# Patient Record
Sex: MALE | Race: WHITE | NOT HISPANIC OR LATINO | Employment: OTHER | URBAN - NONMETROPOLITAN AREA
[De-identification: names, ages, dates, MRNs, and addresses within clinical notes are randomized per-mention and may not be internally consistent; named-entity substitution may affect disease eponyms.]

---

## 2017-01-01 ENCOUNTER — ANESTHESIA (OUTPATIENT)
Dept: PERIOP | Facility: HOSPITAL | Age: 82
End: 2017-01-01

## 2017-01-01 ENCOUNTER — ANTICOAGULATION VISIT (OUTPATIENT)
Dept: CARDIAC SURGERY | Facility: CLINIC | Age: 82
End: 2017-01-01

## 2017-01-01 ENCOUNTER — APPOINTMENT (OUTPATIENT)
Dept: LAB | Facility: HOSPITAL | Age: 82
End: 2017-01-01

## 2017-01-01 ENCOUNTER — OFFICE VISIT (OUTPATIENT)
Dept: CARDIAC SURGERY | Facility: CLINIC | Age: 82
End: 2017-01-01

## 2017-01-01 ENCOUNTER — APPOINTMENT (OUTPATIENT)
Dept: GENERAL RADIOLOGY | Facility: HOSPITAL | Age: 82
End: 2017-01-01

## 2017-01-01 ENCOUNTER — HOSPITAL ENCOUNTER (OUTPATIENT)
Facility: HOSPITAL | Age: 82
Setting detail: HOSPITAL OUTPATIENT SURGERY
Discharge: HOME | End: 2017-05-02
Attending: THORACIC SURGERY (CARDIOTHORACIC VASCULAR SURGERY) | Admitting: THORACIC SURGERY (CARDIOTHORACIC VASCULAR SURGERY)

## 2017-01-01 ENCOUNTER — APPOINTMENT (OUTPATIENT)
Dept: ULTRASOUND IMAGING | Facility: HOSPITAL | Age: 82
End: 2017-01-01

## 2017-01-01 ENCOUNTER — HOSPITAL ENCOUNTER (INPATIENT)
Facility: HOSPITAL | Age: 82
LOS: 11 days | Discharge: HOME | End: 2017-03-16
Attending: EMERGENCY MEDICINE | Admitting: HOSPITALIST

## 2017-01-01 ENCOUNTER — TRANSCRIBE ORDERS (OUTPATIENT)
Dept: LAB | Facility: HOSPITAL | Age: 82
End: 2017-01-01

## 2017-01-01 ENCOUNTER — HOSPITAL ENCOUNTER (INPATIENT)
Facility: HOSPITAL | Age: 82
LOS: 3 days | Discharge: HOSPICE | End: 2017-06-05
Attending: FAMILY MEDICINE | Admitting: FAMILY MEDICINE

## 2017-01-01 ENCOUNTER — HOSPITAL ENCOUNTER (EMERGENCY)
Facility: HOSPITAL | Age: 82
Discharge: HOME | End: 2017-05-12
Attending: EMERGENCY MEDICINE | Admitting: EMERGENCY MEDICINE

## 2017-01-01 ENCOUNTER — HOSPITAL ENCOUNTER (OUTPATIENT)
Dept: CT IMAGING | Facility: HOSPITAL | Age: 82
Discharge: HOME | End: 2017-05-16
Admitting: INTERNAL MEDICINE

## 2017-01-01 ENCOUNTER — ANTICOAGULATION VISIT (OUTPATIENT)
Dept: CARDIOLOGY | Facility: CLINIC | Age: 82
End: 2017-01-01

## 2017-01-01 ENCOUNTER — ANESTHESIA EVENT (OUTPATIENT)
Dept: PERIOP | Facility: HOSPITAL | Age: 82
End: 2017-01-01

## 2017-01-01 ENCOUNTER — APPOINTMENT (OUTPATIENT)
Dept: MRI IMAGING | Facility: HOSPITAL | Age: 82
End: 2017-01-01

## 2017-01-01 VITALS — SYSTOLIC BLOOD PRESSURE: 124 MMHG | HEART RATE: 88 BPM | DIASTOLIC BLOOD PRESSURE: 70 MMHG

## 2017-01-01 VITALS
DIASTOLIC BLOOD PRESSURE: 86 MMHG | RESPIRATION RATE: 20 BRPM | OXYGEN SATURATION: 98 % | SYSTOLIC BLOOD PRESSURE: 158 MMHG | HEART RATE: 86 BPM | HEIGHT: 74 IN | TEMPERATURE: 97.5 F | WEIGHT: 157 LBS | BODY MASS INDEX: 20.15 KG/M2

## 2017-01-01 VITALS
HEIGHT: 74 IN | SYSTOLIC BLOOD PRESSURE: 122 MMHG | TEMPERATURE: 97.3 F | WEIGHT: 165 LBS | DIASTOLIC BLOOD PRESSURE: 64 MMHG | BODY MASS INDEX: 21.17 KG/M2 | HEART RATE: 83 BPM | OXYGEN SATURATION: 92 %

## 2017-01-01 VITALS
OXYGEN SATURATION: 96 % | WEIGHT: 156.97 LBS | DIASTOLIC BLOOD PRESSURE: 86 MMHG | HEART RATE: 76 BPM | HEIGHT: 74 IN | TEMPERATURE: 96.9 F | SYSTOLIC BLOOD PRESSURE: 148 MMHG | BODY MASS INDEX: 20.14 KG/M2 | RESPIRATION RATE: 20 BRPM

## 2017-01-01 VITALS
HEIGHT: 74 IN | WEIGHT: 157.85 LBS | BODY MASS INDEX: 20.26 KG/M2 | SYSTOLIC BLOOD PRESSURE: 150 MMHG | DIASTOLIC BLOOD PRESSURE: 80 MMHG | RESPIRATION RATE: 18 BRPM | TEMPERATURE: 97.7 F | OXYGEN SATURATION: 97 % | HEART RATE: 120 BPM

## 2017-01-01 VITALS
OXYGEN SATURATION: 95 % | SYSTOLIC BLOOD PRESSURE: 80 MMHG | HEART RATE: 124 BPM | WEIGHT: 147 LBS | RESPIRATION RATE: 18 BRPM | TEMPERATURE: 97.7 F | DIASTOLIC BLOOD PRESSURE: 53 MMHG | BODY MASS INDEX: 18.87 KG/M2 | HEIGHT: 74 IN

## 2017-01-01 VITALS — SYSTOLIC BLOOD PRESSURE: 99 MMHG | DIASTOLIC BLOOD PRESSURE: 69 MMHG | HEART RATE: 81 BPM

## 2017-01-01 VITALS — HEART RATE: 88 BPM | SYSTOLIC BLOOD PRESSURE: 118 MMHG | DIASTOLIC BLOOD PRESSURE: 60 MMHG

## 2017-01-01 VITALS
HEART RATE: 112 BPM | BODY MASS INDEX: 21.05 KG/M2 | DIASTOLIC BLOOD PRESSURE: 78 MMHG | WEIGHT: 164 LBS | HEIGHT: 74 IN | TEMPERATURE: 96.9 F | SYSTOLIC BLOOD PRESSURE: 146 MMHG | OXYGEN SATURATION: 96 %

## 2017-01-01 VITALS — HEART RATE: 87 BPM | SYSTOLIC BLOOD PRESSURE: 115 MMHG | DIASTOLIC BLOOD PRESSURE: 75 MMHG

## 2017-01-01 VITALS — HEART RATE: 100 BPM | DIASTOLIC BLOOD PRESSURE: 73 MMHG | SYSTOLIC BLOOD PRESSURE: 140 MMHG

## 2017-01-01 VITALS — DIASTOLIC BLOOD PRESSURE: 87 MMHG | SYSTOLIC BLOOD PRESSURE: 114 MMHG | HEART RATE: 98 BPM

## 2017-01-01 VITALS — DIASTOLIC BLOOD PRESSURE: 52 MMHG | SYSTOLIC BLOOD PRESSURE: 100 MMHG

## 2017-01-01 DIAGNOSIS — R11.2 NAUSEA AND VOMITING: ICD-10-CM

## 2017-01-01 DIAGNOSIS — I26.99 OTHER PULMONARY EMBOLISM WITHOUT ACUTE COR PULMONALE, UNSPECIFIED CHRONICITY (HCC): ICD-10-CM

## 2017-01-01 DIAGNOSIS — Z79.01 LONG-TERM (CURRENT) USE OF ANTICOAGULANTS: ICD-10-CM

## 2017-01-01 DIAGNOSIS — Z48.812 SURGICAL AFTERCARE, CIRCULATORY SYSTEM: ICD-10-CM

## 2017-01-01 DIAGNOSIS — I10 BENIGN ESSENTIAL HTN: ICD-10-CM

## 2017-01-01 DIAGNOSIS — R79.1 ELEVATED INR: ICD-10-CM

## 2017-01-01 DIAGNOSIS — N18.5 CKD (CHRONIC KIDNEY DISEASE) STAGE 5, GFR LESS THAN 15 ML/MIN (HCC): ICD-10-CM

## 2017-01-01 DIAGNOSIS — N18.5 CKD (CHRONIC KIDNEY DISEASE) STAGE 5, GFR LESS THAN 15 ML/MIN (HCC): Primary | ICD-10-CM

## 2017-01-01 DIAGNOSIS — N18.4 CHRONIC KIDNEY DISEASE, STAGE IV (SEVERE) (HCC): Primary | ICD-10-CM

## 2017-01-01 DIAGNOSIS — I21.4 NSTEMI (NON-ST ELEVATED MYOCARDIAL INFARCTION) (HCC): ICD-10-CM

## 2017-01-01 DIAGNOSIS — N18.6 ESRD (END STAGE RENAL DISEASE) (HCC): Primary | ICD-10-CM

## 2017-01-01 DIAGNOSIS — T45.511A COUMADIN TOXICITY, ACCIDENTAL OR UNINTENTIONAL, INITIAL ENCOUNTER: Primary | ICD-10-CM

## 2017-01-01 DIAGNOSIS — R01.2 OTHER ABNORMAL HEART SOUNDS: ICD-10-CM

## 2017-01-01 DIAGNOSIS — K22.9 ESOPHAGEAL DISORDER: Primary | ICD-10-CM

## 2017-01-01 DIAGNOSIS — E80.6 HYPERBILIRUBINEMIA: ICD-10-CM

## 2017-01-01 DIAGNOSIS — Z71.89 ENCOUNTER FOR ANTICOAGULATION DISCUSSION AND COUNSELING: Primary | ICD-10-CM

## 2017-01-01 DIAGNOSIS — I50.9 ACUTE CONGESTIVE HEART FAILURE, UNSPECIFIED CONGESTIVE HEART FAILURE TYPE: ICD-10-CM

## 2017-01-01 DIAGNOSIS — N17.9 RENAL FAILURE (ARF), ACUTE ON CHRONIC (HCC): ICD-10-CM

## 2017-01-01 DIAGNOSIS — J18.9 PNEUMONIA OF BOTH LUNGS DUE TO INFECTIOUS ORGANISM, UNSPECIFIED PART OF LUNG: Primary | ICD-10-CM

## 2017-01-01 DIAGNOSIS — N18.9 RENAL FAILURE (ARF), ACUTE ON CHRONIC (HCC): ICD-10-CM

## 2017-01-01 DIAGNOSIS — I77.0 AV FISTULA (HCC): Primary | ICD-10-CM

## 2017-01-01 DIAGNOSIS — I48.0 PAF (PAROXYSMAL ATRIAL FIBRILLATION) (HCC): ICD-10-CM

## 2017-01-01 DIAGNOSIS — J18.9 PNEUMONIA OF BOTH LUNGS DUE TO INFECTIOUS ORGANISM, UNSPECIFIED PART OF LUNG: ICD-10-CM

## 2017-01-01 LAB
ALBUMIN SERPL-MCNC: 3.4 G/DL (ref 3.4–4.8)
ALBUMIN SERPL-MCNC: 3.5 G/DL (ref 3.4–4.8)
ALBUMIN SERPL-MCNC: 3.6 G/DL (ref 3.4–4.8)
ALBUMIN SERPL-MCNC: 3.8 G/DL (ref 3.4–4.8)
ALBUMIN SERPL-MCNC: 3.9 G/DL (ref 3.4–4.8)
ALBUMIN SERPL-MCNC: 4 G/DL (ref 3.4–4.8)
ALBUMIN SERPL-MCNC: 4.2 G/DL (ref 3.4–4.8)
ALBUMIN SERPL-MCNC: 4.2 G/DL (ref 3.4–4.8)
ALBUMIN SERPL-MCNC: 4.3 G/DL (ref 3.4–4.8)
ALBUMIN SERPL-MCNC: 4.4 G/DL (ref 3.4–4.8)
ALBUMIN SERPL-MCNC: 4.4 G/DL (ref 3.4–4.8)
ALBUMIN SERPL-MCNC: 4.6 G/DL (ref 3.4–4.8)
ALBUMIN/GLOB SERPL: 1.2 G/DL (ref 1.1–1.8)
ALBUMIN/GLOB SERPL: 1.3 G/DL (ref 1.1–1.8)
ALBUMIN/GLOB SERPL: 1.4 G/DL (ref 1.1–1.8)
ALBUMIN/GLOB SERPL: 1.5 G/DL (ref 1.1–1.8)
ALBUMIN/GLOB SERPL: 1.5 G/DL (ref 1.1–1.8)
ALBUMIN/GLOB SERPL: 1.6 G/DL (ref 1.1–1.8)
ALP SERPL-CCNC: 101 U/L (ref 38–126)
ALP SERPL-CCNC: 104 U/L (ref 38–126)
ALP SERPL-CCNC: 62 U/L (ref 38–126)
ALP SERPL-CCNC: 64 U/L (ref 38–126)
ALP SERPL-CCNC: 65 U/L (ref 38–126)
ALP SERPL-CCNC: 66 U/L (ref 38–126)
ALP SERPL-CCNC: 66 U/L (ref 38–126)
ALP SERPL-CCNC: 69 U/L (ref 38–126)
ALP SERPL-CCNC: 72 U/L (ref 38–126)
ALP SERPL-CCNC: 74 U/L (ref 38–126)
ALP SERPL-CCNC: 75 U/L (ref 38–126)
ALP SERPL-CCNC: 78 U/L (ref 38–126)
ALP SERPL-CCNC: 80 U/L (ref 38–126)
ALP SERPL-CCNC: 81 U/L (ref 38–126)
ALP SERPL-CCNC: 90 U/L (ref 38–126)
ALP SERPL-CCNC: 93 U/L (ref 38–126)
ALT SERPL W P-5'-P-CCNC: 113 U/L (ref 21–72)
ALT SERPL W P-5'-P-CCNC: 118 U/L (ref 21–72)
ALT SERPL W P-5'-P-CCNC: 125 U/L (ref 21–72)
ALT SERPL W P-5'-P-CCNC: 21 U/L (ref 21–72)
ALT SERPL W P-5'-P-CCNC: 27 U/L (ref 21–72)
ALT SERPL W P-5'-P-CCNC: 27 U/L (ref 21–72)
ALT SERPL W P-5'-P-CCNC: 29 U/L (ref 21–72)
ALT SERPL W P-5'-P-CCNC: 30 U/L (ref 21–72)
ALT SERPL W P-5'-P-CCNC: 30 U/L (ref 21–72)
ALT SERPL W P-5'-P-CCNC: 31 U/L (ref 21–72)
ALT SERPL W P-5'-P-CCNC: 31 U/L (ref 21–72)
ALT SERPL W P-5'-P-CCNC: 34 U/L (ref 21–72)
ALT SERPL W P-5'-P-CCNC: 36 U/L (ref 21–72)
ALT SERPL W P-5'-P-CCNC: 36 U/L (ref 21–72)
ALT SERPL W P-5'-P-CCNC: 40 U/L (ref 21–72)
ALT SERPL W P-5'-P-CCNC: 43 U/L (ref 21–72)
AMYLASE SERPL-CCNC: 56 U/L (ref 50–130)
ANION GAP SERPL CALCULATED.3IONS-SCNC: 13 MMOL/L (ref 5–15)
ANION GAP SERPL CALCULATED.3IONS-SCNC: 14 MMOL/L (ref 5–15)
ANION GAP SERPL CALCULATED.3IONS-SCNC: 15 MMOL/L (ref 5–15)
ANION GAP SERPL CALCULATED.3IONS-SCNC: 16 MMOL/L (ref 5–15)
ANION GAP SERPL CALCULATED.3IONS-SCNC: 17 MMOL/L (ref 5–15)
ANION GAP SERPL CALCULATED.3IONS-SCNC: 17 MMOL/L (ref 5–15)
ANION GAP SERPL CALCULATED.3IONS-SCNC: 19 MMOL/L (ref 5–15)
ANION GAP SERPL CALCULATED.3IONS-SCNC: 19 MMOL/L (ref 5–15)
ANISOCYTOSIS BLD QL: NORMAL
ANISOCYTOSIS BLD QL: NORMAL
AST SERPL-CCNC: 166 U/L (ref 17–59)
AST SERPL-CCNC: 179 U/L (ref 17–59)
AST SERPL-CCNC: 20 U/L (ref 17–59)
AST SERPL-CCNC: 204 U/L (ref 17–59)
AST SERPL-CCNC: 22 U/L (ref 17–59)
AST SERPL-CCNC: 23 U/L (ref 17–59)
AST SERPL-CCNC: 24 U/L (ref 17–59)
AST SERPL-CCNC: 24 U/L (ref 17–59)
AST SERPL-CCNC: 26 U/L (ref 17–59)
AST SERPL-CCNC: 28 U/L (ref 17–59)
AST SERPL-CCNC: 29 U/L (ref 17–59)
AST SERPL-CCNC: 35 U/L (ref 17–59)
AST SERPL-CCNC: 64 U/L (ref 17–59)
BACTERIA SPEC AEROBE CULT: NORMAL
BACTERIA UR QL AUTO: ABNORMAL /HPF
BASOPHILS # BLD AUTO: 0.02 10*3/MM3 (ref 0–0.2)
BASOPHILS # BLD AUTO: 0.03 10*3/MM3 (ref 0–0.2)
BASOPHILS # BLD AUTO: 0.04 10*3/MM3 (ref 0–0.2)
BASOPHILS # BLD AUTO: 0.05 10*3/MM3 (ref 0–0.2)
BASOPHILS # BLD AUTO: 0.05 10*3/MM3 (ref 0–0.2)
BASOPHILS NFR BLD AUTO: 0.4 % (ref 0–2)
BASOPHILS NFR BLD AUTO: 0.4 % (ref 0–2)
BASOPHILS NFR BLD AUTO: 0.5 % (ref 0–2)
BASOPHILS NFR BLD AUTO: 0.6 % (ref 0–2)
BASOPHILS NFR BLD AUTO: 0.6 % (ref 0–2)
BASOPHILS NFR BLD AUTO: 0.7 % (ref 0–2)
BASOPHILS NFR BLD AUTO: 0.8 % (ref 0–2)
BASOPHILS NFR BLD AUTO: 0.9 % (ref 0–2)
BILIRUB SERPL-MCNC: 0.7 MG/DL (ref 0.2–1.3)
BILIRUB SERPL-MCNC: 0.8 MG/DL (ref 0.2–1.3)
BILIRUB SERPL-MCNC: 0.9 MG/DL (ref 0.2–1.3)
BILIRUB SERPL-MCNC: 1 MG/DL (ref 0.2–1.3)
BILIRUB SERPL-MCNC: 1.1 MG/DL (ref 0.2–1.3)
BILIRUB SERPL-MCNC: 2.1 MG/DL (ref 0.2–1.3)
BILIRUB SERPL-MCNC: 4.1 MG/DL (ref 0.2–1.3)
BILIRUB SERPL-MCNC: 5.9 MG/DL (ref 0.2–1.3)
BILIRUB SERPL-MCNC: 6.3 MG/DL (ref 0.2–1.3)
BILIRUB UR QL STRIP: NEGATIVE
BUN BLD-MCNC: 15 MG/DL (ref 7–21)
BUN BLD-MCNC: 21 MG/DL (ref 7–21)
BUN BLD-MCNC: 32 MG/DL (ref 7–21)
BUN BLD-MCNC: 41 MG/DL (ref 7–21)
BUN BLD-MCNC: 41 MG/DL (ref 7–21)
BUN BLD-MCNC: 43 MG/DL (ref 7–21)
BUN BLD-MCNC: 44 MG/DL (ref 7–21)
BUN BLD-MCNC: 44 MG/DL (ref 7–21)
BUN BLD-MCNC: 46 MG/DL (ref 7–21)
BUN BLD-MCNC: 46 MG/DL (ref 7–21)
BUN BLD-MCNC: 48 MG/DL (ref 7–21)
BUN BLD-MCNC: 49 MG/DL (ref 7–21)
BUN BLD-MCNC: 49 MG/DL (ref 7–21)
BUN BLD-MCNC: 50 MG/DL (ref 7–21)
BUN BLD-MCNC: 54 MG/DL (ref 7–21)
BUN BLD-MCNC: 54 MG/DL (ref 7–21)
BUN BLD-MCNC: 57 MG/DL (ref 7–21)
BUN BLD-MCNC: 58 MG/DL (ref 7–21)
BUN/CREAT SERPL: 6.7 (ref 7–25)
BUN/CREAT SERPL: 7.3 (ref 7–25)
BUN/CREAT SERPL: 8.1 (ref 7–25)
BUN/CREAT SERPL: 8.6 (ref 7–25)
BUN/CREAT SERPL: 8.7 (ref 7–25)
BUN/CREAT SERPL: 8.8 (ref 7–25)
BUN/CREAT SERPL: 8.9 (ref 7–25)
BUN/CREAT SERPL: 9.1 (ref 7–25)
BUN/CREAT SERPL: 9.2 (ref 7–25)
BUN/CREAT SERPL: 9.2 (ref 7–25)
BUN/CREAT SERPL: 9.3 (ref 7–25)
BUN/CREAT SERPL: 9.4 (ref 7–25)
BUN/CREAT SERPL: 9.6 (ref 7–25)
BUN/CREAT SERPL: 9.7 (ref 7–25)
CALCIUM SPEC-SCNC: 8.7 MG/DL (ref 8.4–10.2)
CALCIUM SPEC-SCNC: 8.8 MG/DL (ref 8.4–10.2)
CALCIUM SPEC-SCNC: 8.9 MG/DL (ref 8.4–10.2)
CALCIUM SPEC-SCNC: 9 MG/DL (ref 8.4–10.2)
CALCIUM SPEC-SCNC: 9.1 MG/DL (ref 8.4–10.2)
CALCIUM SPEC-SCNC: 9.1 MG/DL (ref 8.4–10.2)
CALCIUM SPEC-SCNC: 9.2 MG/DL (ref 8.4–10.2)
CALCIUM SPEC-SCNC: 9.2 MG/DL (ref 8.4–10.2)
CALCIUM SPEC-SCNC: 9.3 MG/DL (ref 8.4–10.2)
CALCIUM SPEC-SCNC: 9.4 MG/DL (ref 8.4–10.2)
CALCIUM SPEC-SCNC: 9.5 MG/DL (ref 8.4–10.2)
CALCIUM SPEC-SCNC: 9.6 MG/DL (ref 8.4–10.2)
CANCER AG19-9 SERPL-ACNC: 85 U/ML (ref 0–35)
CHLORIDE SERPL-SCNC: 101 MMOL/L (ref 95–110)
CHLORIDE SERPL-SCNC: 102 MMOL/L (ref 95–110)
CHLORIDE SERPL-SCNC: 103 MMOL/L (ref 95–110)
CHLORIDE SERPL-SCNC: 104 MMOL/L (ref 95–110)
CHLORIDE SERPL-SCNC: 108 MMOL/L (ref 95–110)
CHLORIDE SERPL-SCNC: 89 MMOL/L (ref 95–110)
CHLORIDE SERPL-SCNC: 90 MMOL/L (ref 95–110)
CHLORIDE SERPL-SCNC: 91 MMOL/L (ref 95–110)
CHLORIDE SERPL-SCNC: 94 MMOL/L (ref 95–110)
CHLORIDE SERPL-SCNC: 99 MMOL/L (ref 95–110)
CK MB SERPL-CCNC: 2.56 NG/ML (ref 0–5)
CK MB SERPL-CCNC: 3.34 NG/ML (ref 0–5)
CK SERPL-CCNC: 167 U/L (ref 55–170)
CK SERPL-CCNC: 204 U/L (ref 55–170)
CK SERPL-CCNC: 233 U/L (ref 55–170)
CLARITY UR: ABNORMAL
CLUMPED PLATELETS: NORMAL
CO2 SERPL-SCNC: 18 MMOL/L (ref 22–31)
CO2 SERPL-SCNC: 18 MMOL/L (ref 22–31)
CO2 SERPL-SCNC: 20 MMOL/L (ref 22–31)
CO2 SERPL-SCNC: 21 MMOL/L (ref 22–31)
CO2 SERPL-SCNC: 21 MMOL/L (ref 22–31)
CO2 SERPL-SCNC: 22 MMOL/L (ref 22–31)
CO2 SERPL-SCNC: 22 MMOL/L (ref 22–31)
CO2 SERPL-SCNC: 23 MMOL/L (ref 22–31)
CO2 SERPL-SCNC: 23 MMOL/L (ref 22–31)
CO2 SERPL-SCNC: 24 MMOL/L (ref 22–31)
CO2 SERPL-SCNC: 25 MMOL/L (ref 22–31)
CO2 SERPL-SCNC: 25 MMOL/L (ref 22–31)
CO2 SERPL-SCNC: 26 MMOL/L (ref 22–31)
CO2 SERPL-SCNC: 27 MMOL/L (ref 22–31)
COLOR UR: ABNORMAL
CREAT BLD-MCNC: 2.25 MG/DL (ref 0.7–1.3)
CREAT BLD-MCNC: 2.86 MG/DL (ref 0.7–1.3)
CREAT BLD-MCNC: 3.97 MG/DL (ref 0.7–1.3)
CREAT BLD-MCNC: 4.26 MG/DL (ref 0.7–1.3)
CREAT BLD-MCNC: 4.44 MG/DL (ref 0.7–1.3)
CREAT BLD-MCNC: 4.54 MG/DL (ref 0.7–1.3)
CREAT BLD-MCNC: 4.75 MG/DL (ref 0.7–1.3)
CREAT BLD-MCNC: 4.94 MG/DL (ref 0.7–1.3)
CREAT BLD-MCNC: 5.02 MG/DL (ref 0.7–1.3)
CREAT BLD-MCNC: 5.21 MG/DL (ref 0.7–1.3)
CREAT BLD-MCNC: 5.23 MG/DL (ref 0.7–1.3)
CREAT BLD-MCNC: 5.29 MG/DL (ref 0.7–1.3)
CREAT BLD-MCNC: 5.36 MG/DL (ref 0.7–1.3)
CREAT BLD-MCNC: 5.63 MG/DL (ref 0.7–1.3)
CREAT BLD-MCNC: 6.15 MG/DL (ref 0.7–1.3)
CREAT BLD-MCNC: 6.17 MG/DL (ref 0.7–1.3)
CREAT BLD-MCNC: 6.17 MG/DL (ref 0.7–1.3)
CREAT BLD-MCNC: 6.56 MG/DL (ref 0.7–1.3)
CRP SERPL-MCNC: 6.1 MG/DL (ref 0–1)
D-LACTATE SERPL-SCNC: 2.1 MMOL/L (ref 0.5–2)
D-LACTATE SERPL-SCNC: 2.8 MMOL/L (ref 0.5–2)
DACRYOCYTES BLD QL SMEAR: NORMAL
DACRYOCYTES BLD QL SMEAR: NORMAL
DEPRECATED RDW RBC AUTO: 47.5 FL (ref 35.1–43.9)
DEPRECATED RDW RBC AUTO: 47.8 FL (ref 35.1–43.9)
DEPRECATED RDW RBC AUTO: 47.9 FL (ref 35.1–43.9)
DEPRECATED RDW RBC AUTO: 48.1 FL (ref 35.1–43.9)
DEPRECATED RDW RBC AUTO: 48.4 FL (ref 35.1–43.9)
DEPRECATED RDW RBC AUTO: 48.5 FL (ref 35.1–43.9)
DEPRECATED RDW RBC AUTO: 48.8 FL (ref 35.1–43.9)
DEPRECATED RDW RBC AUTO: 49 FL (ref 35.1–43.9)
DEPRECATED RDW RBC AUTO: 50 FL (ref 35.1–43.9)
DEPRECATED RDW RBC AUTO: 50.2 FL (ref 35.1–43.9)
DEPRECATED RDW RBC AUTO: 50.4 FL (ref 35.1–43.9)
DEPRECATED RDW RBC AUTO: 50.7 FL (ref 35.1–43.9)
DEPRECATED RDW RBC AUTO: 69.2 FL (ref 35.1–43.9)
DEPRECATED RDW RBC AUTO: 75.3 FL (ref 35.1–43.9)
DEPRECATED RDW RBC AUTO: 76 FL (ref 35.1–43.9)
DEPRECATED RDW RBC AUTO: 77.1 FL (ref 35.1–43.9)
EOSINOPHIL # BLD AUTO: 0 10*3/MM3 (ref 0–0.7)
EOSINOPHIL # BLD AUTO: 0.03 10*3/MM3 (ref 0–0.7)
EOSINOPHIL # BLD AUTO: 0.03 10*3/MM3 (ref 0–0.7)
EOSINOPHIL # BLD AUTO: 0.12 10*3/MM3 (ref 0–0.7)
EOSINOPHIL # BLD AUTO: 0.18 10*3/MM3 (ref 0–0.7)
EOSINOPHIL # BLD AUTO: 0.21 10*3/MM3 (ref 0–0.7)
EOSINOPHIL # BLD AUTO: 0.23 10*3/MM3 (ref 0–0.7)
EOSINOPHIL # BLD AUTO: 0.24 10*3/MM3 (ref 0–0.7)
EOSINOPHIL # BLD AUTO: 0.27 10*3/MM3 (ref 0–0.7)
EOSINOPHIL # BLD AUTO: 0.28 10*3/MM3 (ref 0–0.7)
EOSINOPHIL # BLD AUTO: 0.29 10*3/MM3 (ref 0–0.7)
EOSINOPHIL # BLD AUTO: 0.29 10*3/MM3 (ref 0–0.7)
EOSINOPHIL # BLD AUTO: 0.31 10*3/MM3 (ref 0–0.7)
EOSINOPHIL # BLD AUTO: 0.33 10*3/MM3 (ref 0–0.7)
EOSINOPHIL # BLD AUTO: 0.35 10*3/MM3 (ref 0–0.7)
EOSINOPHIL # BLD AUTO: 0.38 10*3/MM3 (ref 0–0.7)
EOSINOPHIL NFR BLD AUTO: 0 % (ref 0–7)
EOSINOPHIL NFR BLD AUTO: 0.5 % (ref 0–7)
EOSINOPHIL NFR BLD AUTO: 0.6 % (ref 0–7)
EOSINOPHIL NFR BLD AUTO: 2 % (ref 0–7)
EOSINOPHIL NFR BLD AUTO: 3.2 % (ref 0–7)
EOSINOPHIL NFR BLD AUTO: 4.1 % (ref 0–7)
EOSINOPHIL NFR BLD AUTO: 4.1 % (ref 0–7)
EOSINOPHIL NFR BLD AUTO: 4.2 % (ref 0–7)
EOSINOPHIL NFR BLD AUTO: 4.4 % (ref 0–7)
EOSINOPHIL NFR BLD AUTO: 4.5 % (ref 0–7)
EOSINOPHIL NFR BLD AUTO: 5 % (ref 0–7)
EOSINOPHIL NFR BLD AUTO: 5 % (ref 0–7)
EOSINOPHIL NFR BLD AUTO: 5.2 % (ref 0–7)
EOSINOPHIL NFR BLD AUTO: 5.3 % (ref 0–7)
EOSINOPHIL NFR BLD AUTO: 5.7 % (ref 0–7)
EOSINOPHIL NFR BLD AUTO: 5.9 % (ref 0–7)
ERYTHROCYTE [DISTWIDTH] IN BLOOD BY AUTOMATED COUNT: 15.2 % (ref 11.5–14.5)
ERYTHROCYTE [DISTWIDTH] IN BLOOD BY AUTOMATED COUNT: 15.4 % (ref 11.5–14.5)
ERYTHROCYTE [DISTWIDTH] IN BLOOD BY AUTOMATED COUNT: 15.5 % (ref 11.5–14.5)
ERYTHROCYTE [DISTWIDTH] IN BLOOD BY AUTOMATED COUNT: 15.5 % (ref 11.5–14.5)
ERYTHROCYTE [DISTWIDTH] IN BLOOD BY AUTOMATED COUNT: 15.6 % (ref 11.5–14.5)
ERYTHROCYTE [DISTWIDTH] IN BLOOD BY AUTOMATED COUNT: 15.6 % (ref 11.5–14.5)
ERYTHROCYTE [DISTWIDTH] IN BLOOD BY AUTOMATED COUNT: 15.8 % (ref 11.5–14.5)
ERYTHROCYTE [DISTWIDTH] IN BLOOD BY AUTOMATED COUNT: 15.9 % (ref 11.5–14.5)
ERYTHROCYTE [DISTWIDTH] IN BLOOD BY AUTOMATED COUNT: 16.1 % (ref 11.5–14.5)
ERYTHROCYTE [DISTWIDTH] IN BLOOD BY AUTOMATED COUNT: 20.4 % (ref 11.5–14.5)
ERYTHROCYTE [DISTWIDTH] IN BLOOD BY AUTOMATED COUNT: 22.6 % (ref 11.5–14.5)
ERYTHROCYTE [DISTWIDTH] IN BLOOD BY AUTOMATED COUNT: 22.6 % (ref 11.5–14.5)
ERYTHROCYTE [DISTWIDTH] IN BLOOD BY AUTOMATED COUNT: 23.1 % (ref 11.5–14.5)
GFR SERPL CREATININE-BSD FRML MDRD: 10 ML/MIN/1.73 (ref 60–98)
GFR SERPL CREATININE-BSD FRML MDRD: 11 ML/MIN/1.73 (ref 42–98)
GFR SERPL CREATININE-BSD FRML MDRD: 11 ML/MIN/1.73 (ref 60–98)
GFR SERPL CREATININE-BSD FRML MDRD: 12 ML/MIN/1.73 (ref 42–98)
GFR SERPL CREATININE-BSD FRML MDRD: 12 ML/MIN/1.73 (ref 60–98)
GFR SERPL CREATININE-BSD FRML MDRD: 13 ML/MIN/1.73 (ref 42–98)
GFR SERPL CREATININE-BSD FRML MDRD: 13 ML/MIN/1.73 (ref 42–98)
GFR SERPL CREATININE-BSD FRML MDRD: 15 ML/MIN/1.73 (ref 60–98)
GFR SERPL CREATININE-BSD FRML MDRD: 21 ML/MIN/1.73 (ref 60–98)
GFR SERPL CREATININE-BSD FRML MDRD: 28 ML/MIN/1.73 (ref 60–98)
GFR SERPL CREATININE-BSD FRML MDRD: 8 ML/MIN/1.73 (ref 42–98)
GFR SERPL CREATININE-BSD FRML MDRD: 9 ML/MIN/1.73 (ref 42–98)
GFR SERPL CREATININE-BSD FRML MDRD: 9 ML/MIN/1.73 (ref 60–98)
GFR SERPL CREATININE-BSD FRML MDRD: 9 ML/MIN/1.73 (ref 60–98)
GLOBULIN UR ELPH-MCNC: 2.6 GM/DL (ref 2.3–3.5)
GLOBULIN UR ELPH-MCNC: 2.6 GM/DL (ref 2.3–3.5)
GLOBULIN UR ELPH-MCNC: 2.7 GM/DL (ref 2.3–3.5)
GLOBULIN UR ELPH-MCNC: 2.8 GM/DL (ref 2.3–3.5)
GLOBULIN UR ELPH-MCNC: 2.9 GM/DL (ref 2.3–3.5)
GLOBULIN UR ELPH-MCNC: 3 GM/DL (ref 2.3–3.5)
GLOBULIN UR ELPH-MCNC: 3.2 GM/DL (ref 2.3–3.5)
GLOBULIN UR ELPH-MCNC: 3.4 GM/DL (ref 2.3–3.5)
GLUCOSE BLD-MCNC: 100 MG/DL (ref 60–100)
GLUCOSE BLD-MCNC: 102 MG/DL (ref 60–100)
GLUCOSE BLD-MCNC: 105 MG/DL (ref 60–100)
GLUCOSE BLD-MCNC: 73 MG/DL (ref 60–100)
GLUCOSE BLD-MCNC: 79 MG/DL (ref 60–100)
GLUCOSE BLD-MCNC: 81 MG/DL (ref 60–100)
GLUCOSE BLD-MCNC: 83 MG/DL (ref 60–100)
GLUCOSE BLD-MCNC: 86 MG/DL (ref 60–100)
GLUCOSE BLD-MCNC: 86 MG/DL (ref 60–100)
GLUCOSE BLD-MCNC: 87 MG/DL (ref 60–100)
GLUCOSE BLD-MCNC: 89 MG/DL (ref 60–100)
GLUCOSE BLD-MCNC: 91 MG/DL (ref 60–100)
GLUCOSE BLD-MCNC: 92 MG/DL (ref 60–100)
GLUCOSE BLD-MCNC: 93 MG/DL (ref 60–100)
GLUCOSE BLD-MCNC: 95 MG/DL (ref 60–100)
GLUCOSE UR STRIP-MCNC: NEGATIVE MG/DL
HBV CORE IGM SERPL QL IA: NEGATIVE
HBV SURFACE AB SER QL: <5 (ref 0–4.99)
HBV SURFACE AB SER QL: <5 (ref 0–4.99)
HBV SURFACE AB SER RIA-ACNC: ABNORMAL
HBV SURFACE AB SER RIA-ACNC: ABNORMAL
HBV SURFACE AG SERPL QL IA: NEGATIVE
HBV SURFACE AG SERPL QL IA: NEGATIVE
HCT VFR BLD AUTO: 31.4 % (ref 39–49)
HCT VFR BLD AUTO: 31.5 % (ref 39–49)
HCT VFR BLD AUTO: 32.5 % (ref 39–49)
HCT VFR BLD AUTO: 32.8 % (ref 39–49)
HCT VFR BLD AUTO: 32.8 % (ref 39–49)
HCT VFR BLD AUTO: 33 % (ref 39–49)
HCT VFR BLD AUTO: 33.4 % (ref 39–49)
HCT VFR BLD AUTO: 33.6 % (ref 39–49)
HCT VFR BLD AUTO: 34.3 % (ref 39–49)
HCT VFR BLD AUTO: 34.5 % (ref 39–49)
HCT VFR BLD AUTO: 34.6 % (ref 39–49)
HCT VFR BLD AUTO: 36.9 % (ref 39–49)
HCT VFR BLD AUTO: 37.7 % (ref 39–49)
HCT VFR BLD AUTO: 39.5 % (ref 39–49)
HCT VFR BLD AUTO: 39.6 % (ref 39–49)
HCT VFR BLD AUTO: 39.7 % (ref 39–49)
HCV AB SER DONR QL: NEGATIVE
HGB BLD-MCNC: 10.4 G/DL (ref 13.7–17.3)
HGB BLD-MCNC: 10.4 G/DL (ref 13.7–17.3)
HGB BLD-MCNC: 10.7 G/DL (ref 13.7–17.3)
HGB BLD-MCNC: 10.7 G/DL (ref 13.7–17.3)
HGB BLD-MCNC: 10.9 G/DL (ref 13.7–17.3)
HGB BLD-MCNC: 10.9 G/DL (ref 13.7–17.3)
HGB BLD-MCNC: 11.1 G/DL (ref 13.7–17.3)
HGB BLD-MCNC: 11.2 G/DL (ref 13.7–17.3)
HGB BLD-MCNC: 11.3 G/DL (ref 13.7–17.3)
HGB BLD-MCNC: 11.3 G/DL (ref 13.7–17.3)
HGB BLD-MCNC: 11.4 G/DL (ref 13.7–17.3)
HGB BLD-MCNC: 12.2 G/DL (ref 13.7–17.3)
HGB BLD-MCNC: 12.4 G/DL (ref 13.7–17.3)
HGB BLD-MCNC: 12.8 G/DL (ref 13.7–17.3)
HGB BLD-MCNC: 13 G/DL (ref 13.7–17.3)
HGB BLD-MCNC: 13.2 G/DL (ref 13.7–17.3)
HGB UR QL STRIP.AUTO: ABNORMAL
HOLD SPECIMEN: NORMAL
HYALINE CASTS UR QL AUTO: ABNORMAL /LPF
IMM GRANULOCYTES # BLD: 0.01 10*3/MM3 (ref 0–0.02)
IMM GRANULOCYTES # BLD: 0.02 10*3/MM3 (ref 0–0.02)
IMM GRANULOCYTES # BLD: 0.02 10*3/MM3 (ref 0–0.02)
IMM GRANULOCYTES # BLD: 0.03 10*3/MM3 (ref 0–0.02)
IMM GRANULOCYTES # BLD: 0.04 10*3/MM3 (ref 0–0.02)
IMM GRANULOCYTES # BLD: 0.05 10*3/MM3 (ref 0–0.02)
IMM GRANULOCYTES NFR BLD: 0.2 % (ref 0–0.5)
IMM GRANULOCYTES NFR BLD: 0.4 % (ref 0–0.5)
IMM GRANULOCYTES NFR BLD: 0.5 % (ref 0–0.5)
IMM GRANULOCYTES NFR BLD: 0.5 % (ref 0–0.5)
IMM GRANULOCYTES NFR BLD: 0.6 % (ref 0–0.5)
IMM GRANULOCYTES NFR BLD: 0.7 % (ref 0–0.5)
IMM GRANULOCYTES NFR BLD: 0.8 % (ref 0–0.5)
IMM GRANULOCYTES NFR BLD: 0.9 % (ref 0–0.5)
IMM GRANULOCYTES NFR BLD: 1.2 % (ref 0–0.5)
INR PPP: 1.18 (ref 0.8–1.2)
INR PPP: 1.28 (ref 0.8–1.2)
INR PPP: 1.28 (ref 0.8–1.2)
INR PPP: 1.3 (ref 0.8–1.2)
INR PPP: 1.31 (ref 0.8–1.2)
INR PPP: 1.33 (ref 0.8–1.2)
INR PPP: 1.4 (ref 0.9–1.1)
INR PPP: 1.44 (ref 0.8–1.2)
INR PPP: 1.44 (ref 0.8–1.2)
INR PPP: 1.46 (ref 0.8–1.2)
INR PPP: 1.85 (ref 0.8–1.2)
INR PPP: 1.93 (ref 0.8–1.2)
INR PPP: 2.02 (ref 0.8–1.2)
INR PPP: 2.1 (ref 0.9–1.1)
INR PPP: 2.1 (ref 0.9–1.1)
INR PPP: 2.14 (ref 0.8–1.2)
INR PPP: 2.2 (ref 0.9–1.1)
INR PPP: 2.4 (ref 0.9–1.1)
INR PPP: 2.8 (ref 0.9–1.1)
INR PPP: 2.8 (ref 0.9–1.1)
INR PPP: 2.9 (ref 0.8–1.2)
INR PPP: 4.28 (ref 0.8–1.2)
INR PPP: 5.1 (ref 0.9–1.1)
INR PPP: 5.77 (ref 0.8–1.2)
INR PPP: 6.29 (ref 0.8–1.2)
INR PPP: 6.46 (ref 0.8–1.2)
INR PPP: >13 (ref 0.8–1.2)
INR PPP: >18 (ref 0.8–1.2)
KETONES UR QL STRIP: NEGATIVE
LARGE PLATELETS: NORMAL
LEUKOCYTE ESTERASE UR QL STRIP.AUTO: ABNORMAL
LIPASE SERPL-CCNC: 65 U/L (ref 23–300)
LIPASE SERPL-CCNC: 79 U/L (ref 23–300)
LIPASE SERPL-CCNC: 99 U/L (ref 23–300)
LYMPHOCYTES # BLD AUTO: 0.31 10*3/MM3 (ref 0.6–4.2)
LYMPHOCYTES # BLD AUTO: 0.49 10*3/MM3 (ref 0.6–4.2)
LYMPHOCYTES # BLD AUTO: 0.66 10*3/MM3 (ref 0.6–4.2)
LYMPHOCYTES # BLD AUTO: 0.91 10*3/MM3 (ref 0.6–4.2)
LYMPHOCYTES # BLD AUTO: 1.21 10*3/MM3 (ref 0.6–4.2)
LYMPHOCYTES # BLD AUTO: 1.26 10*3/MM3 (ref 0.6–4.2)
LYMPHOCYTES # BLD AUTO: 1.31 10*3/MM3 (ref 0.6–4.2)
LYMPHOCYTES # BLD AUTO: 1.33 10*3/MM3 (ref 0.6–4.2)
LYMPHOCYTES # BLD AUTO: 1.38 10*3/MM3 (ref 0.6–4.2)
LYMPHOCYTES # BLD AUTO: 1.41 10*3/MM3 (ref 0.6–4.2)
LYMPHOCYTES # BLD AUTO: 1.53 10*3/MM3 (ref 0.6–4.2)
LYMPHOCYTES # BLD AUTO: 1.53 10*3/MM3 (ref 0.6–4.2)
LYMPHOCYTES # BLD AUTO: 1.58 10*3/MM3 (ref 0.6–4.2)
LYMPHOCYTES # BLD AUTO: 1.59 10*3/MM3 (ref 0.6–4.2)
LYMPHOCYTES # BLD AUTO: 1.62 10*3/MM3 (ref 0.6–4.2)
LYMPHOCYTES # BLD AUTO: 1.97 10*3/MM3 (ref 0.6–4.2)
LYMPHOCYTES NFR BLD AUTO: 11.7 % (ref 10–50)
LYMPHOCYTES NFR BLD AUTO: 14.8 % (ref 10–50)
LYMPHOCYTES NFR BLD AUTO: 19 % (ref 10–50)
LYMPHOCYTES NFR BLD AUTO: 23.1 % (ref 10–50)
LYMPHOCYTES NFR BLD AUTO: 23.3 % (ref 10–50)
LYMPHOCYTES NFR BLD AUTO: 23.5 % (ref 10–50)
LYMPHOCYTES NFR BLD AUTO: 23.5 % (ref 10–50)
LYMPHOCYTES NFR BLD AUTO: 24.6 % (ref 10–50)
LYMPHOCYTES NFR BLD AUTO: 25.5 % (ref 10–50)
LYMPHOCYTES NFR BLD AUTO: 25.8 % (ref 10–50)
LYMPHOCYTES NFR BLD AUTO: 26.8 % (ref 10–50)
LYMPHOCYTES NFR BLD AUTO: 27.4 % (ref 10–50)
LYMPHOCYTES NFR BLD AUTO: 28.2 % (ref 10–50)
LYMPHOCYTES NFR BLD AUTO: 29.7 % (ref 10–50)
LYMPHOCYTES NFR BLD AUTO: 9.3 % (ref 10–50)
LYMPHOCYTES NFR BLD AUTO: 9.9 % (ref 10–50)
MACROCYTES BLD QL SMEAR: NORMAL
MAGNESIUM SERPL-MCNC: 2.2 MG/DL (ref 1.6–2.3)
MCH RBC QN AUTO: 28.1 PG (ref 26.5–34)
MCH RBC QN AUTO: 28.2 PG (ref 26.5–34)
MCH RBC QN AUTO: 28.3 PG (ref 26.5–34)
MCH RBC QN AUTO: 28.4 PG (ref 26.5–34)
MCH RBC QN AUTO: 28.4 PG (ref 26.5–34)
MCH RBC QN AUTO: 28.5 PG (ref 26.5–34)
MCH RBC QN AUTO: 28.6 PG (ref 26.5–34)
MCH RBC QN AUTO: 28.7 PG (ref 26.5–34)
MCH RBC QN AUTO: 29.6 PG (ref 26.5–34)
MCH RBC QN AUTO: 31.1 PG (ref 26.5–34)
MCH RBC QN AUTO: 31.3 PG (ref 26.5–34)
MCH RBC QN AUTO: 31.3 PG (ref 26.5–34)
MCHC RBC AUTO-ENTMCNC: 32.2 G/DL (ref 31.5–36.3)
MCHC RBC AUTO-ENTMCNC: 32.4 G/DL (ref 31.5–36.3)
MCHC RBC AUTO-ENTMCNC: 32.6 G/DL (ref 31.5–36.3)
MCHC RBC AUTO-ENTMCNC: 32.8 G/DL (ref 31.5–36.3)
MCHC RBC AUTO-ENTMCNC: 32.9 G/DL (ref 31.5–36.3)
MCHC RBC AUTO-ENTMCNC: 33 G/DL (ref 31.5–36.3)
MCHC RBC AUTO-ENTMCNC: 33 G/DL (ref 31.5–36.3)
MCHC RBC AUTO-ENTMCNC: 33.1 G/DL (ref 31.5–36.3)
MCHC RBC AUTO-ENTMCNC: 33.2 G/DL (ref 31.5–36.3)
MCHC RBC AUTO-ENTMCNC: 33.2 G/DL (ref 31.5–36.3)
MCHC RBC AUTO-ENTMCNC: 33.3 G/DL (ref 31.5–36.3)
MCHC RBC AUTO-ENTMCNC: 33.3 G/DL (ref 31.5–36.3)
MCHC RBC AUTO-ENTMCNC: 33.6 G/DL (ref 31.5–36.3)
MCV RBC AUTO: 85.2 FL (ref 80–98)
MCV RBC AUTO: 85.4 FL (ref 80–98)
MCV RBC AUTO: 85.6 FL (ref 80–98)
MCV RBC AUTO: 85.7 FL (ref 80–98)
MCV RBC AUTO: 86 FL (ref 80–98)
MCV RBC AUTO: 86 FL (ref 80–98)
MCV RBC AUTO: 86.2 FL (ref 80–98)
MCV RBC AUTO: 86.3 FL (ref 80–98)
MCV RBC AUTO: 86.8 FL (ref 80–98)
MCV RBC AUTO: 86.8 FL (ref 80–98)
MCV RBC AUTO: 91.9 FL (ref 80–98)
MCV RBC AUTO: 93.8 FL (ref 80–98)
MCV RBC AUTO: 95.2 FL (ref 80–98)
MCV RBC AUTO: 96.2 FL (ref 80–98)
MONOCYTES # BLD AUTO: 0.32 10*3/MM3 (ref 0–0.9)
MONOCYTES # BLD AUTO: 0.53 10*3/MM3 (ref 0–0.9)
MONOCYTES # BLD AUTO: 0.54 10*3/MM3 (ref 0–0.9)
MONOCYTES # BLD AUTO: 0.6 10*3/MM3 (ref 0–0.9)
MONOCYTES # BLD AUTO: 0.61 10*3/MM3 (ref 0–0.9)
MONOCYTES # BLD AUTO: 0.63 10*3/MM3 (ref 0–0.9)
MONOCYTES # BLD AUTO: 0.63 10*3/MM3 (ref 0–0.9)
MONOCYTES # BLD AUTO: 0.71 10*3/MM3 (ref 0–0.9)
MONOCYTES # BLD AUTO: 0.72 10*3/MM3 (ref 0–0.9)
MONOCYTES # BLD AUTO: 0.75 10*3/MM3 (ref 0–0.9)
MONOCYTES # BLD AUTO: 0.75 10*3/MM3 (ref 0–0.9)
MONOCYTES # BLD AUTO: 0.76 10*3/MM3 (ref 0–0.9)
MONOCYTES # BLD AUTO: 0.78 10*3/MM3 (ref 0–0.9)
MONOCYTES # BLD AUTO: 0.8 10*3/MM3 (ref 0–0.9)
MONOCYTES # BLD AUTO: 0.84 10*3/MM3 (ref 0–0.9)
MONOCYTES # BLD AUTO: 0.98 10*3/MM3 (ref 0–0.9)
MONOCYTES NFR BLD AUTO: 10.7 % (ref 0–12)
MONOCYTES NFR BLD AUTO: 10.8 % (ref 0–12)
MONOCYTES NFR BLD AUTO: 11.6 % (ref 0–12)
MONOCYTES NFR BLD AUTO: 11.6 % (ref 0–12)
MONOCYTES NFR BLD AUTO: 12.6 % (ref 0–12)
MONOCYTES NFR BLD AUTO: 12.7 % (ref 0–12)
MONOCYTES NFR BLD AUTO: 12.8 % (ref 0–12)
MONOCYTES NFR BLD AUTO: 13.4 % (ref 0–12)
MONOCYTES NFR BLD AUTO: 13.8 % (ref 0–12)
MONOCYTES NFR BLD AUTO: 14.6 % (ref 0–12)
MONOCYTES NFR BLD AUTO: 14.9 % (ref 0–12)
MONOCYTES NFR BLD AUTO: 15.4 % (ref 0–12)
MONOCYTES NFR BLD AUTO: 9 % (ref 0–12)
MONOCYTES NFR BLD AUTO: 9.2 % (ref 0–12)
MONOCYTES NFR BLD AUTO: 9.6 % (ref 0–12)
MONOCYTES NFR BLD AUTO: 9.6 % (ref 0–12)
NEUTROPHILS # BLD AUTO: 2.63 10*3/MM3 (ref 2–8.6)
NEUTROPHILS # BLD AUTO: 2.86 10*3/MM3 (ref 2–8.6)
NEUTROPHILS # BLD AUTO: 2.91 10*3/MM3 (ref 2–8.6)
NEUTROPHILS # BLD AUTO: 2.92 10*3/MM3 (ref 2–8.6)
NEUTROPHILS # BLD AUTO: 3.06 10*3/MM3 (ref 2–8.6)
NEUTROPHILS # BLD AUTO: 3.23 10*3/MM3 (ref 2–8.6)
NEUTROPHILS # BLD AUTO: 3.24 10*3/MM3 (ref 2–8.6)
NEUTROPHILS # BLD AUTO: 3.25 10*3/MM3 (ref 2–8.6)
NEUTROPHILS # BLD AUTO: 3.37 10*3/MM3 (ref 2–8.6)
NEUTROPHILS # BLD AUTO: 3.47 10*3/MM3 (ref 2–8.6)
NEUTROPHILS # BLD AUTO: 3.59 10*3/MM3 (ref 2–8.6)
NEUTROPHILS # BLD AUTO: 3.61 10*3/MM3 (ref 2–8.6)
NEUTROPHILS # BLD AUTO: 3.83 10*3/MM3 (ref 2–8.6)
NEUTROPHILS # BLD AUTO: 4.34 10*3/MM3 (ref 2–8.6)
NEUTROPHILS # BLD AUTO: 4.36 10*3/MM3 (ref 2–8.6)
NEUTROPHILS # BLD AUTO: 4.59 10*3/MM3 (ref 2–8.6)
NEUTROPHILS NFR BLD AUTO: 52 % (ref 37–80)
NEUTROPHILS NFR BLD AUTO: 54.2 % (ref 37–80)
NEUTROPHILS NFR BLD AUTO: 54.3 % (ref 37–80)
NEUTROPHILS NFR BLD AUTO: 54.9 % (ref 37–80)
NEUTROPHILS NFR BLD AUTO: 55.2 % (ref 37–80)
NEUTROPHILS NFR BLD AUTO: 55.6 % (ref 37–80)
NEUTROPHILS NFR BLD AUTO: 56 % (ref 37–80)
NEUTROPHILS NFR BLD AUTO: 56.1 % (ref 37–80)
NEUTROPHILS NFR BLD AUTO: 56.6 % (ref 37–80)
NEUTROPHILS NFR BLD AUTO: 57.2 % (ref 37–80)
NEUTROPHILS NFR BLD AUTO: 57.8 % (ref 37–80)
NEUTROPHILS NFR BLD AUTO: 66.8 % (ref 37–80)
NEUTROPHILS NFR BLD AUTO: 70.9 % (ref 37–80)
NEUTROPHILS NFR BLD AUTO: 77 % (ref 37–80)
NEUTROPHILS NFR BLD AUTO: 77.6 % (ref 37–80)
NEUTROPHILS NFR BLD AUTO: 79 % (ref 37–80)
NITRITE UR QL STRIP: POSITIVE
NRBC BLD MANUAL-RTO: 0 /100 WBC (ref 0–0)
NT-PROBNP SERPL-MCNC: ABNORMAL PG/ML (ref 0–1800)
OVALOCYTES BLD QL SMEAR: NORMAL
PH UR STRIP.AUTO: 5.5 [PH] (ref 5–9)
PHOSPHATE SERPL-MCNC: 4.3 MG/DL (ref 2.4–4.4)
PHOSPHATE SERPL-MCNC: 4.5 MG/DL (ref 2.4–4.4)
PHOSPHATE SERPL-MCNC: 4.6 MG/DL (ref 2.4–4.4)
PHOSPHATE SERPL-MCNC: 4.8 MG/DL (ref 2.4–4.4)
PLATELET # BLD AUTO: 125 10*3/MM3 (ref 150–450)
PLATELET # BLD AUTO: 145 10*3/MM3 (ref 150–450)
PLATELET # BLD AUTO: 160 10*3/MM3 (ref 150–450)
PLATELET # BLD AUTO: 166 10*3/MM3 (ref 150–450)
PLATELET # BLD AUTO: 174 10*3/MM3 (ref 150–450)
PLATELET # BLD AUTO: 184 10*3/MM3 (ref 150–450)
PLATELET # BLD AUTO: 185 10*3/MM3 (ref 150–450)
PLATELET # BLD AUTO: 187 10*3/MM3 (ref 150–450)
PLATELET # BLD AUTO: 191 10*3/MM3 (ref 150–450)
PLATELET # BLD AUTO: 201 10*3/MM3 (ref 150–450)
PLATELET # BLD AUTO: 210 10*3/MM3 (ref 150–450)
PLATELET # BLD AUTO: 212 10*3/MM3 (ref 150–450)
PLATELET # BLD AUTO: 212 10*3/MM3 (ref 150–450)
PLATELET # BLD AUTO: 213 10*3/MM3 (ref 150–450)
PLATELET # BLD AUTO: 223 10*3/MM3 (ref 150–450)
PLATELET # BLD AUTO: 95 10*3/MM3 (ref 150–450)
PMV BLD AUTO: 10.8 FL (ref 8–12)
PMV BLD AUTO: 11.4 FL (ref 8–12)
PMV BLD AUTO: 11.5 FL (ref 8–12)
PMV BLD AUTO: 11.6 FL (ref 8–12)
PMV BLD AUTO: 11.7 FL (ref 8–12)
PMV BLD AUTO: 11.8 FL (ref 8–12)
PMV BLD AUTO: 11.9 FL (ref 8–12)
PMV BLD AUTO: 12 FL (ref 8–12)
PMV BLD AUTO: 12.1 FL (ref 8–12)
PMV BLD AUTO: 12.2 FL (ref 8–12)
PMV BLD AUTO: 12.3 FL (ref 8–12)
PMV BLD AUTO: ABNORMAL FL (ref 8–12)
POLYCHROMASIA BLD QL SMEAR: NORMAL
POLYCHROMASIA BLD QL SMEAR: NORMAL
POTASSIUM BLD-SCNC: 3.3 MMOL/L (ref 3.5–5.1)
POTASSIUM BLD-SCNC: 3.5 MMOL/L (ref 3.5–5.1)
POTASSIUM BLD-SCNC: 3.5 MMOL/L (ref 3.5–5.1)
POTASSIUM BLD-SCNC: 3.6 MMOL/L (ref 3.5–5.1)
POTASSIUM BLD-SCNC: 3.7 MMOL/L (ref 3.5–5.1)
POTASSIUM BLD-SCNC: 3.9 MMOL/L (ref 3.5–5.1)
POTASSIUM BLD-SCNC: 3.9 MMOL/L (ref 3.5–5.1)
POTASSIUM BLD-SCNC: 4 MMOL/L (ref 3.5–5.1)
POTASSIUM BLD-SCNC: 4.2 MMOL/L (ref 3.5–5.1)
POTASSIUM BLD-SCNC: 4.3 MMOL/L (ref 3.5–5.1)
POTASSIUM BLD-SCNC: 4.5 MMOL/L (ref 3.5–5.1)
POTASSIUM BLD-SCNC: 4.7 MMOL/L (ref 3.5–5.1)
POTASSIUM BLD-SCNC: 5 MMOL/L (ref 3.5–5.1)
PROT SERPL-MCNC: 6 G/DL (ref 6.3–8.6)
PROT SERPL-MCNC: 6.3 G/DL (ref 6.3–8.6)
PROT SERPL-MCNC: 6.5 G/DL (ref 6.3–8.6)
PROT SERPL-MCNC: 6.5 G/DL (ref 6.3–8.6)
PROT SERPL-MCNC: 6.6 G/DL (ref 6.3–8.6)
PROT SERPL-MCNC: 6.7 G/DL (ref 6.3–8.6)
PROT SERPL-MCNC: 6.7 G/DL (ref 6.3–8.6)
PROT SERPL-MCNC: 6.8 G/DL (ref 6.3–8.6)
PROT SERPL-MCNC: 6.8 G/DL (ref 6.3–8.6)
PROT SERPL-MCNC: 6.9 G/DL (ref 6.3–8.6)
PROT SERPL-MCNC: 7 G/DL (ref 6.3–8.6)
PROT SERPL-MCNC: 7 G/DL (ref 6.3–8.6)
PROT SERPL-MCNC: 7.1 G/DL (ref 6.3–8.6)
PROT SERPL-MCNC: 7.1 G/DL (ref 6.3–8.6)
PROT SERPL-MCNC: 7.6 G/DL (ref 6.3–8.6)
PROT SERPL-MCNC: 8 G/DL (ref 6.3–8.6)
PROT UR QL STRIP: ABNORMAL
PROTHROMBIN TIME: 15 SECONDS (ref 11.1–15.3)
PROTHROMBIN TIME: 15.9 SECONDS (ref 11.1–15.3)
PROTHROMBIN TIME: 16 SECONDS (ref 11.1–15.3)
PROTHROMBIN TIME: 16.1 SECONDS (ref 11.1–15.3)
PROTHROMBIN TIME: 16.3 SECONDS (ref 11.1–15.3)
PROTHROMBIN TIME: 16.5 SECONDS (ref 11.1–15.3)
PROTHROMBIN TIME: 17.4 SECONDS (ref 11.1–15.3)
PROTHROMBIN TIME: 17.4 SECONDS (ref 11.1–15.3)
PROTHROMBIN TIME: 17.7 SECONDS (ref 11.1–15.3)
PROTHROMBIN TIME: 21 SECONDS (ref 11.1–15.3)
PROTHROMBIN TIME: 21.6 SECONDS (ref 11.1–15.3)
PROTHROMBIN TIME: 22.4 SECONDS (ref 11.1–15.3)
PROTHROMBIN TIME: 23.4 SECONDS (ref 11.1–15.3)
PROTHROMBIN TIME: 29.4 SECONDS (ref 11.1–15.3)
PROTHROMBIN TIME: 39.4 SECONDS (ref 11.1–15.3)
PROTHROMBIN TIME: 49.3 SECONDS (ref 11.1–15.3)
PROTHROMBIN TIME: 52.6 SECONDS (ref 11.1–15.3)
PROTHROMBIN TIME: 53.7 SECONDS (ref 11.1–15.3)
PROTHROMBIN TIME: >120 SECONDS (ref 11.1–15.3)
PROTHROMBIN TIME: >120 SECONDS (ref 11.1–15.3)
PTH-INTACT SERPL-MCNC: 568.7 PG/ML (ref 10–65)
RBC # BLD AUTO: 3.64 10*6/MM3 (ref 4.37–5.74)
RBC # BLD AUTO: 3.68 10*6/MM3 (ref 4.37–5.74)
RBC # BLD AUTO: 3.78 10*6/MM3 (ref 4.37–5.74)
RBC # BLD AUTO: 3.78 10*6/MM3 (ref 4.37–5.74)
RBC # BLD AUTO: 3.83 10*6/MM3 (ref 4.37–5.74)
RBC # BLD AUTO: 3.84 10*6/MM3 (ref 4.37–5.74)
RBC # BLD AUTO: 3.91 10*6/MM3 (ref 4.37–5.74)
RBC # BLD AUTO: 3.92 10*6/MM3 (ref 4.37–5.74)
RBC # BLD AUTO: 3.92 10*6/MM3 (ref 4.37–5.74)
RBC # BLD AUTO: 3.95 10*6/MM3 (ref 4.37–5.74)
RBC # BLD AUTO: 4.01 10*6/MM3 (ref 4.37–5.74)
RBC # BLD AUTO: 4.06 10*6/MM3 (ref 4.37–5.74)
RBC # BLD AUTO: 4.15 10*6/MM3 (ref 4.37–5.74)
RBC # BLD AUTO: 4.22 10*6/MM3 (ref 4.37–5.74)
RBC # BLD AUTO: 4.32 10*6/MM3 (ref 4.37–5.74)
RBC # BLD AUTO: 4.32 10*6/MM3 (ref 4.37–5.74)
RBC # UR: ABNORMAL /HPF
REF LAB TEST METHOD: ABNORMAL
SCHISTOCYTES BLD QL SMEAR: NORMAL
SMALL PLATELETS BLD QL SMEAR: NORMAL
SMALL PLATELETS BLD QL SMEAR: NORMAL
SODIUM BLD-SCNC: 129 MMOL/L (ref 137–145)
SODIUM BLD-SCNC: 131 MMOL/L (ref 137–145)
SODIUM BLD-SCNC: 131 MMOL/L (ref 137–145)
SODIUM BLD-SCNC: 136 MMOL/L (ref 137–145)
SODIUM BLD-SCNC: 136 MMOL/L (ref 137–145)
SODIUM BLD-SCNC: 138 MMOL/L (ref 137–145)
SODIUM BLD-SCNC: 138 MMOL/L (ref 137–145)
SODIUM BLD-SCNC: 139 MMOL/L (ref 137–145)
SODIUM BLD-SCNC: 140 MMOL/L (ref 137–145)
SODIUM BLD-SCNC: 141 MMOL/L (ref 137–145)
SODIUM BLD-SCNC: 141 MMOL/L (ref 137–145)
SODIUM BLD-SCNC: 142 MMOL/L (ref 137–145)
SODIUM BLD-SCNC: 146 MMOL/L (ref 137–145)
SP GR UR STRIP: 1.02 (ref 1–1.03)
SQUAMOUS #/AREA URNS HPF: ABNORMAL /HPF
TROPONIN I SERPL-MCNC: 0.29 NG/ML
TROPONIN I SERPL-MCNC: 0.3 NG/ML
TROPONIN I SERPL-MCNC: 0.33 NG/ML
TROPONIN I SERPL-MCNC: 0.38 NG/ML
TROPONIN I SERPL-MCNC: 0.4 NG/ML
TROPONIN I SERPL-MCNC: 0.41 NG/ML
TROPONIN I SERPL-MCNC: <0.012 NG/ML
TSH SERPL DL<=0.05 MIU/L-ACNC: 6.32 MIU/ML (ref 0.46–4.68)
UROBILINOGEN UR QL STRIP: ABNORMAL
WBC MORPH BLD: NORMAL
WBC NRBC COR # BLD: 3.33 10*3/MM3 (ref 3.2–9.8)
WBC NRBC COR # BLD: 4.94 10*3/MM3 (ref 3.2–9.8)
WBC NRBC COR # BLD: 5.14 10*3/MM3 (ref 3.2–9.8)
WBC NRBC COR # BLD: 5.26 10*3/MM3 (ref 3.2–9.8)
WBC NRBC COR # BLD: 5.46 10*3/MM3 (ref 3.2–9.8)
WBC NRBC COR # BLD: 5.61 10*3/MM3 (ref 3.2–9.8)
WBC NRBC COR # BLD: 5.62 10*3/MM3 (ref 3.2–9.8)
WBC NRBC COR # BLD: 5.64 10*3/MM3 (ref 3.2–9.8)
WBC NRBC COR # BLD: 5.66 10*3/MM3 (ref 3.2–9.8)
WBC NRBC COR # BLD: 5.81 10*3/MM3 (ref 3.2–9.8)
WBC NRBC COR # BLD: 6.01 10*3/MM3 (ref 3.2–9.8)
WBC NRBC COR # BLD: 6.14 10*3/MM3 (ref 3.2–9.8)
WBC NRBC COR # BLD: 6.29 10*3/MM3 (ref 3.2–9.8)
WBC NRBC COR # BLD: 6.57 10*3/MM3 (ref 3.2–9.8)
WBC NRBC COR # BLD: 6.63 10*3/MM3 (ref 3.2–9.8)
WBC NRBC COR # BLD: 6.88 10*3/MM3 (ref 3.2–9.8)
WBC UR QL AUTO: ABNORMAL /HPF
WHOLE BLOOD HOLD SPECIMEN: NORMAL

## 2017-01-01 PROCEDURE — 71020 HC CHEST PA AND LATERAL: CPT

## 2017-01-01 PROCEDURE — 25010000002 HEPARIN (PORCINE) PER 1000 UNITS: Performed by: INTERNAL MEDICINE

## 2017-01-01 PROCEDURE — 84443 ASSAY THYROID STIM HORMONE: CPT | Performed by: FAMILY MEDICINE

## 2017-01-01 PROCEDURE — 80053 COMPREHEN METABOLIC PANEL: CPT | Performed by: INTERNAL MEDICINE

## 2017-01-01 PROCEDURE — P9046 ALBUMIN (HUMAN), 25%, 20 ML: HCPCS | Performed by: FAMILY MEDICINE

## 2017-01-01 PROCEDURE — 82553 CREATINE MB FRACTION: CPT | Performed by: FAMILY MEDICINE

## 2017-01-01 PROCEDURE — 25010000002 MORPHINE SULFATE (PF) 2 MG/ML SOLUTION: Performed by: FAMILY MEDICINE

## 2017-01-01 PROCEDURE — 25010000002 FENTANYL CITRATE (PF) 100 MCG/2ML SOLUTION: Performed by: THORACIC SURGERY (CARDIOTHORACIC VASCULAR SURGERY)

## 2017-01-01 PROCEDURE — 85025 COMPLETE CBC W/AUTO DIFF WBC: CPT | Performed by: FAMILY MEDICINE

## 2017-01-01 PROCEDURE — 82150 ASSAY OF AMYLASE: CPT | Performed by: FAMILY MEDICINE

## 2017-01-01 PROCEDURE — 85610 PROTHROMBIN TIME: CPT | Performed by: ANESTHESIOLOGY

## 2017-01-01 PROCEDURE — 85610 PROTHROMBIN TIME: CPT | Performed by: FAMILY MEDICINE

## 2017-01-01 PROCEDURE — 85610 PROTHROMBIN TIME: CPT | Performed by: INTERNAL MEDICINE

## 2017-01-01 PROCEDURE — 36558 INSERT TUNNELED CV CATH: CPT | Performed by: THORACIC SURGERY (CARDIOTHORACIC VASCULAR SURGERY)

## 2017-01-01 PROCEDURE — 85025 COMPLETE CBC W/AUTO DIFF WBC: CPT | Performed by: INTERNAL MEDICINE

## 2017-01-01 PROCEDURE — 25010000002 HEPARIN (PORCINE) PER 1000 UNITS: Performed by: FAMILY MEDICINE

## 2017-01-01 PROCEDURE — 85610 PROTHROMBIN TIME: CPT | Performed by: NURSE PRACTITIONER

## 2017-01-01 PROCEDURE — 84484 ASSAY OF TROPONIN QUANT: CPT | Performed by: FAMILY MEDICINE

## 2017-01-01 PROCEDURE — 25010000002 CEFTRIAXONE: Performed by: INTERNAL MEDICINE

## 2017-01-01 PROCEDURE — 80053 COMPREHEN METABOLIC PANEL: CPT | Performed by: EMERGENCY MEDICINE

## 2017-01-01 PROCEDURE — 25010000002 FUROSEMIDE PER 20 MG: Performed by: INTERNAL MEDICINE

## 2017-01-01 PROCEDURE — 25010000002 MIDAZOLAM PER 1 MG: Performed by: THORACIC SURGERY (CARDIOTHORACIC VASCULAR SURGERY)

## 2017-01-01 PROCEDURE — 25010000002 HEPARIN (PORCINE) PER 1000 UNITS: Performed by: NURSE PRACTITIONER

## 2017-01-01 PROCEDURE — 74176 CT ABD & PELVIS W/O CONTRAST: CPT

## 2017-01-01 PROCEDURE — 86803 HEPATITIS C AB TEST: CPT | Performed by: INTERNAL MEDICINE

## 2017-01-01 PROCEDURE — 25010000002 MAGNESIUM SULFATE IN D5W 1G/100ML (PREMIX) 1-5 GM/100ML-% SOLUTION: Performed by: FAMILY MEDICINE

## 2017-01-01 PROCEDURE — 25010000002 PROTHROMBIN COMPLEX CONC HUMAN 500 UNITS KIT

## 2017-01-01 PROCEDURE — 80048 BASIC METABOLIC PNL TOTAL CA: CPT | Performed by: INTERNAL MEDICINE

## 2017-01-01 PROCEDURE — 80053 COMPREHEN METABOLIC PANEL: CPT | Performed by: FAMILY MEDICINE

## 2017-01-01 PROCEDURE — B244ZZZ ULTRASONOGRAPHY OF RIGHT HEART: ICD-10-PCS | Performed by: RADIOLOGY

## 2017-01-01 PROCEDURE — 80048 BASIC METABOLIC PNL TOTAL CA: CPT | Performed by: ANESTHESIOLOGY

## 2017-01-01 PROCEDURE — 71010 HC CHEST PA OR AP: CPT

## 2017-01-01 PROCEDURE — 86705 HEP B CORE ANTIBODY IGM: CPT | Performed by: INTERNAL MEDICINE

## 2017-01-01 PROCEDURE — 99232 SBSQ HOSP IP/OBS MODERATE 35: CPT | Performed by: FAMILY MEDICINE

## 2017-01-01 PROCEDURE — 99222 1ST HOSP IP/OBS MODERATE 55: CPT | Performed by: FAMILY MEDICINE

## 2017-01-01 PROCEDURE — 25010000002 PROTAMINE SULFATE PER 10 MG: Performed by: NURSE ANESTHETIST, CERTIFIED REGISTERED

## 2017-01-01 PROCEDURE — 84100 ASSAY OF PHOSPHORUS: CPT | Performed by: INTERNAL MEDICINE

## 2017-01-01 PROCEDURE — 25010000002 VITAMIN K1 PER 1 MG: Performed by: FAMILY MEDICINE

## 2017-01-01 PROCEDURE — 99285 EMERGENCY DEPT VISIT HI MDM: CPT

## 2017-01-01 PROCEDURE — 76770 US EXAM ABDO BACK WALL COMP: CPT

## 2017-01-01 PROCEDURE — 76705 ECHO EXAM OF ABDOMEN: CPT

## 2017-01-01 PROCEDURE — 87040 BLOOD CULTURE FOR BACTERIA: CPT | Performed by: INTERNAL MEDICINE

## 2017-01-01 PROCEDURE — 5A1D00Z PERFORMANCE OF URINARY FILTRATION, SINGLE: ICD-10-PCS | Performed by: INTERNAL MEDICINE

## 2017-01-01 PROCEDURE — 83880 ASSAY OF NATRIURETIC PEPTIDE: CPT | Performed by: EMERGENCY MEDICINE

## 2017-01-01 PROCEDURE — 25010000002 CEFTRIAXONE: Performed by: FAMILY MEDICINE

## 2017-01-01 PROCEDURE — 82553 CREATINE MB FRACTION: CPT | Performed by: EMERGENCY MEDICINE

## 2017-01-01 PROCEDURE — 82550 ASSAY OF CK (CPK): CPT | Performed by: INTERNAL MEDICINE

## 2017-01-01 PROCEDURE — 99231 SBSQ HOSP IP/OBS SF/LOW 25: CPT | Performed by: NURSE PRACTITIONER

## 2017-01-01 PROCEDURE — 85007 BL SMEAR W/DIFF WBC COUNT: CPT | Performed by: FAMILY MEDICINE

## 2017-01-01 PROCEDURE — 93005 ELECTROCARDIOGRAM TRACING: CPT

## 2017-01-01 PROCEDURE — 25010000002 PROTHROMBIN COMPLEX CONC HUMAN 1000 UNITS KIT

## 2017-01-01 PROCEDURE — 85610 PROTHROMBIN TIME: CPT | Performed by: EMERGENCY MEDICINE

## 2017-01-01 PROCEDURE — 99211 OFF/OP EST MAY X REQ PHY/QHP: CPT | Performed by: NURSE PRACTITIONER

## 2017-01-01 PROCEDURE — 25010000002 PROPOFOL 10 MG/ML EMULSION: Performed by: NURSE ANESTHETIST, CERTIFIED REGISTERED

## 2017-01-01 PROCEDURE — 25010000002 AZITHROMYCIN: Performed by: INTERNAL MEDICINE

## 2017-01-01 PROCEDURE — 85025 COMPLETE CBC W/AUTO DIFF WBC: CPT | Performed by: EMERGENCY MEDICINE

## 2017-01-01 PROCEDURE — 86301 IMMUNOASSAY TUMOR CA 19-9: CPT | Performed by: INTERNAL MEDICINE

## 2017-01-01 PROCEDURE — 77001 FLUOROGUIDE FOR VEIN DEVICE: CPT | Performed by: THORACIC SURGERY (CARDIOTHORACIC VASCULAR SURGERY)

## 2017-01-01 PROCEDURE — 25010000002 FUROSEMIDE PER 20 MG: Performed by: EMERGENCY MEDICINE

## 2017-01-01 PROCEDURE — 83605 ASSAY OF LACTIC ACID: CPT | Performed by: FAMILY MEDICINE

## 2017-01-01 PROCEDURE — 99024 POSTOP FOLLOW-UP VISIT: CPT | Performed by: NURSE PRACTITIONER

## 2017-01-01 PROCEDURE — 87340 HEPATITIS B SURFACE AG IA: CPT | Performed by: INTERNAL MEDICINE

## 2017-01-01 PROCEDURE — 36830 ARTERY-VEIN NONAUTOGRAFT: CPT | Performed by: THORACIC SURGERY (CARDIOTHORACIC VASCULAR SURGERY)

## 2017-01-01 PROCEDURE — 36416 COLLJ CAPILLARY BLOOD SPEC: CPT | Performed by: NURSE PRACTITIONER

## 2017-01-01 PROCEDURE — 36415 COLL VENOUS BLD VENIPUNCTURE: CPT

## 2017-01-01 PROCEDURE — C9132 KCENTRA, PER I.U.: HCPCS

## 2017-01-01 PROCEDURE — 87086 URINE CULTURE/COLONY COUNT: CPT | Performed by: INTERNAL MEDICINE

## 2017-01-01 PROCEDURE — C1750 CATH, HEMODIALYSIS,LONG-TERM: HCPCS | Performed by: THORACIC SURGERY (CARDIOTHORACIC VASCULAR SURGERY)

## 2017-01-01 PROCEDURE — 86706 HEP B SURFACE ANTIBODY: CPT | Performed by: INTERNAL MEDICINE

## 2017-01-01 PROCEDURE — 83690 ASSAY OF LIPASE: CPT | Performed by: EMERGENCY MEDICINE

## 2017-01-01 PROCEDURE — 25010000002 HEPARIN (PORCINE) PER 1000 UNITS: Performed by: THORACIC SURGERY (CARDIOTHORACIC VASCULAR SURGERY)

## 2017-01-01 PROCEDURE — 81001 URINALYSIS AUTO W/SCOPE: CPT | Performed by: INTERNAL MEDICINE

## 2017-01-01 PROCEDURE — 82550 ASSAY OF CK (CPK): CPT | Performed by: EMERGENCY MEDICINE

## 2017-01-01 PROCEDURE — 36415 COLL VENOUS BLD VENIPUNCTURE: CPT | Performed by: INTERNAL MEDICINE

## 2017-01-01 PROCEDURE — 25010000002 CEFAZOLIN PER 500 MG: Performed by: THORACIC SURGERY (CARDIOTHORACIC VASCULAR SURGERY)

## 2017-01-01 PROCEDURE — 25010000002 VANCOMYCIN PER 500 MG: Performed by: THORACIC SURGERY (CARDIOTHORACIC VASCULAR SURGERY)

## 2017-01-01 PROCEDURE — 74181 MRI ABDOMEN W/O CONTRAST: CPT

## 2017-01-01 PROCEDURE — 86140 C-REACTIVE PROTEIN: CPT | Performed by: FAMILY MEDICINE

## 2017-01-01 PROCEDURE — 25010000002 ALBUMIN HUMAN 25% PER 50 ML: Performed by: FAMILY MEDICINE

## 2017-01-01 PROCEDURE — 84132 ASSAY OF SERUM POTASSIUM: CPT | Performed by: THORACIC SURGERY (CARDIOTHORACIC VASCULAR SURGERY)

## 2017-01-01 PROCEDURE — 82378 CARCINOEMBRYONIC ANTIGEN: CPT | Performed by: INTERNAL MEDICINE

## 2017-01-01 PROCEDURE — 87040 BLOOD CULTURE FOR BACTERIA: CPT | Performed by: FAMILY MEDICINE

## 2017-01-01 PROCEDURE — 93010 ELECTROCARDIOGRAM REPORT: CPT | Performed by: INTERNAL MEDICINE

## 2017-01-01 PROCEDURE — 25010000002 AZITHROMYCIN: Performed by: EMERGENCY MEDICINE

## 2017-01-01 PROCEDURE — 76937 US GUIDE VASCULAR ACCESS: CPT

## 2017-01-01 PROCEDURE — 83690 ASSAY OF LIPASE: CPT | Performed by: FAMILY MEDICINE

## 2017-01-01 PROCEDURE — 99205 OFFICE O/P NEW HI 60 MIN: CPT | Performed by: THORACIC SURGERY (CARDIOTHORACIC VASCULAR SURGERY)

## 2017-01-01 PROCEDURE — 83735 ASSAY OF MAGNESIUM: CPT | Performed by: FAMILY MEDICINE

## 2017-01-01 PROCEDURE — 83970 ASSAY OF PARATHORMONE: CPT | Performed by: INTERNAL MEDICINE

## 2017-01-01 PROCEDURE — 25010000002 CEFTRIAXONE: Performed by: EMERGENCY MEDICINE

## 2017-01-01 PROCEDURE — 25010000002 MIDAZOLAM PER 1 MG: Performed by: NURSE ANESTHETIST, CERTIFIED REGISTERED

## 2017-01-01 PROCEDURE — C1894 INTRO/SHEATH, NON-LASER: HCPCS | Performed by: THORACIC SURGERY (CARDIOTHORACIC VASCULAR SURGERY)

## 2017-01-01 PROCEDURE — C1768 GRAFT, VASCULAR: HCPCS | Performed by: THORACIC SURGERY (CARDIOTHORACIC VASCULAR SURGERY)

## 2017-01-01 PROCEDURE — 99284 EMERGENCY DEPT VISIT MOD MDM: CPT

## 2017-01-01 PROCEDURE — 85007 BL SMEAR W/DIFF WBC COUNT: CPT | Performed by: INTERNAL MEDICINE

## 2017-01-01 PROCEDURE — 99283 EMERGENCY DEPT VISIT LOW MDM: CPT

## 2017-01-01 PROCEDURE — 82550 ASSAY OF CK (CPK): CPT | Performed by: FAMILY MEDICINE

## 2017-01-01 PROCEDURE — 02H633Z INSERTION OF INFUSION DEVICE INTO RIGHT ATRIUM, PERCUTANEOUS APPROACH: ICD-10-PCS | Performed by: RADIOLOGY

## 2017-01-01 PROCEDURE — 84484 ASSAY OF TROPONIN QUANT: CPT | Performed by: EMERGENCY MEDICINE

## 2017-01-01 PROCEDURE — 25010000002 PHENYLEPHRINE PER 1 ML: Performed by: NURSE ANESTHETIST, CERTIFIED REGISTERED

## 2017-01-01 DEVICE — GRFT VASC COLLGN 6MM 40MM: Type: IMPLANTABLE DEVICE | Site: ARM | Status: FUNCTIONAL

## 2017-01-01 RX ORDER — ISOSORBIDE MONONITRATE 30 MG/1
30 TABLET, EXTENDED RELEASE ORAL DAILY
Status: CANCELLED | OUTPATIENT
Start: 2017-01-01

## 2017-01-01 RX ORDER — MIDAZOLAM HYDROCHLORIDE 1 MG/ML
INJECTION INTRAMUSCULAR; INTRAVENOUS AS NEEDED
Status: DISCONTINUED | OUTPATIENT
Start: 2017-01-01 | End: 2017-01-01 | Stop reason: HOSPADM

## 2017-01-01 RX ORDER — DILTIAZEM HYDROCHLORIDE 120 MG/1
120 CAPSULE, COATED, EXTENDED RELEASE ORAL DAILY
Qty: 30 CAPSULE | Refills: 0 | Status: SHIPPED | OUTPATIENT
Start: 2017-01-01 | End: 2017-01-01 | Stop reason: HOSPADM

## 2017-01-01 RX ORDER — ISOSORBIDE MONONITRATE 30 MG/1
30 TABLET, EXTENDED RELEASE ORAL DAILY
COMMUNITY
End: 2017-01-01 | Stop reason: HOSPADM

## 2017-01-01 RX ORDER — SUCRALFATE 1 G/1
1 TABLET ORAL 3 TIMES DAILY
Status: CANCELLED | OUTPATIENT
Start: 2017-01-01

## 2017-01-01 RX ORDER — WARFARIN SODIUM 2.5 MG/1
2.5 TABLET ORAL
COMMUNITY
End: 2017-01-01 | Stop reason: SDUPTHER

## 2017-01-01 RX ORDER — SPIRONOLACTONE 25 MG/1
25 TABLET ORAL DAILY
Status: DISCONTINUED | OUTPATIENT
Start: 2017-01-01 | End: 2017-01-01

## 2017-01-01 RX ORDER — ACETAMINOPHEN 650 MG/1
650 SUPPOSITORY RECTAL EVERY 4 HOURS PRN
Qty: 6 SUPPOSITORY | Refills: 0 | Status: SHIPPED | OUTPATIENT
Start: 2017-01-01

## 2017-01-01 RX ORDER — HYDROCODONE BITARTRATE AND ACETAMINOPHEN 5; 325 MG/1; MG/1
1-2 TABLET ORAL EVERY 4 HOURS PRN
Qty: 40 TABLET | Refills: 0 | Status: SHIPPED | OUTPATIENT
Start: 2017-01-01 | End: 2017-01-01

## 2017-01-01 RX ORDER — SODIUM CHLORIDE 9 MG/ML
30 INJECTION, SOLUTION INTRAVENOUS CONTINUOUS
Status: CANCELLED | OUTPATIENT
Start: 2017-01-01

## 2017-01-01 RX ORDER — COLCHICINE 0.6 MG/1
0.6 TABLET ORAL DAILY
COMMUNITY
End: 2017-01-01 | Stop reason: HOSPADM

## 2017-01-01 RX ORDER — CARVEDILOL 12.5 MG/1
12.5 TABLET ORAL 2 TIMES DAILY WITH MEALS
Status: CANCELLED | OUTPATIENT
Start: 2017-01-01

## 2017-01-01 RX ORDER — NALOXONE HCL 0.4 MG/ML
0.2 VIAL (ML) INJECTION AS NEEDED
Status: DISCONTINUED | OUTPATIENT
Start: 2017-01-01 | End: 2017-01-01 | Stop reason: HOSPADM

## 2017-01-01 RX ORDER — SPIRONOLACTONE 25 MG/1
25 TABLET ORAL 2 TIMES DAILY
COMMUNITY
End: 2017-01-01 | Stop reason: HOSPADM

## 2017-01-01 RX ORDER — HEPARIN SODIUM 5000 [USP'U]/ML
INJECTION, SOLUTION INTRAVENOUS; SUBCUTANEOUS AS NEEDED
Status: DISCONTINUED | OUTPATIENT
Start: 2017-01-01 | End: 2017-01-01 | Stop reason: HOSPADM

## 2017-01-01 RX ORDER — SODIUM CHLORIDE 0.9 % (FLUSH) 0.9 %
10 SYRINGE (ML) INJECTION AS NEEDED
Status: DISCONTINUED | OUTPATIENT
Start: 2017-01-01 | End: 2017-01-01 | Stop reason: HOSPADM

## 2017-01-01 RX ORDER — ALBUMIN (HUMAN) 12.5 G/50ML
12.5 SOLUTION INTRAVENOUS ONCE
Status: COMPLETED | OUTPATIENT
Start: 2017-01-01 | End: 2017-01-01

## 2017-01-01 RX ORDER — PROPOFOL 10 MG/ML
VIAL (ML) INTRAVENOUS AS NEEDED
Status: DISCONTINUED | OUTPATIENT
Start: 2017-01-01 | End: 2017-01-01 | Stop reason: SURG

## 2017-01-01 RX ORDER — ISOSORBIDE MONONITRATE 30 MG/1
30 TABLET, EXTENDED RELEASE ORAL DAILY
Status: DISCONTINUED | OUTPATIENT
Start: 2017-01-01 | End: 2017-01-01 | Stop reason: HOSPADM

## 2017-01-01 RX ORDER — POLYETHYLENE GLYCOL 3350 17 G/17G
17 POWDER, FOR SOLUTION ORAL DAILY
Qty: 500 G | Refills: 0 | Status: SHIPPED | OUTPATIENT
Start: 2017-01-01 | End: 2017-01-01

## 2017-01-01 RX ORDER — ACETAMINOPHEN AND CODEINE PHOSPHATE 300; 30 MG/1; MG/1
1 TABLET ORAL EVERY 6 HOURS PRN
Status: DISCONTINUED | OUTPATIENT
Start: 2017-01-01 | End: 2017-01-01

## 2017-01-01 RX ORDER — MORPHINE SULFATE 2 MG/ML
2 INJECTION, SOLUTION INTRAMUSCULAR; INTRAVENOUS EVERY 4 HOURS PRN
Status: DISCONTINUED | OUTPATIENT
Start: 2017-01-01 | End: 2017-01-01 | Stop reason: HOSPADM

## 2017-01-01 RX ORDER — AMLODIPINE BESYLATE 10 MG/1
10 TABLET ORAL NIGHTLY
Status: DISCONTINUED | OUTPATIENT
Start: 2017-01-01 | End: 2017-01-01

## 2017-01-01 RX ORDER — DILTIAZEM HYDROCHLORIDE 120 MG/1
120 CAPSULE, COATED, EXTENDED RELEASE ORAL DAILY
Status: DISCONTINUED | OUTPATIENT
Start: 2017-01-01 | End: 2017-01-01 | Stop reason: HOSPADM

## 2017-01-01 RX ORDER — SODIUM CHLORIDE 9 MG/ML
30 INJECTION, SOLUTION INTRAVENOUS CONTINUOUS
Status: DISCONTINUED | OUTPATIENT
Start: 2017-01-01 | End: 2017-01-01 | Stop reason: HOSPADM

## 2017-01-01 RX ORDER — FENTANYL CITRATE 50 UG/ML
INJECTION, SOLUTION INTRAMUSCULAR; INTRAVENOUS AS NEEDED
Status: DISCONTINUED | OUTPATIENT
Start: 2017-01-01 | End: 2017-01-01 | Stop reason: HOSPADM

## 2017-01-01 RX ORDER — PROCHLORPERAZINE 25 MG
25 SUPPOSITORY, RECTAL RECTAL EVERY 12 HOURS PRN
Qty: 6 SUPPOSITORY | Refills: 0 | Status: SHIPPED | OUTPATIENT
Start: 2017-01-01

## 2017-01-01 RX ORDER — COLCHICINE 0.6 MG/1
0.6 TABLET ORAL DAILY
Status: CANCELLED | OUTPATIENT
Start: 2017-01-01

## 2017-01-01 RX ORDER — COLCHICINE 0.6 MG/1
0.6 TABLET ORAL DAILY
Status: DISCONTINUED | OUTPATIENT
Start: 2017-01-01 | End: 2017-01-01

## 2017-01-01 RX ORDER — CARVEDILOL 12.5 MG/1
12.5 TABLET ORAL 2 TIMES DAILY WITH MEALS
Status: DISCONTINUED | OUTPATIENT
Start: 2017-01-01 | End: 2017-01-01 | Stop reason: HOSPADM

## 2017-01-01 RX ORDER — BACITRACIN 50000 [IU]/1
INJECTION, POWDER, FOR SOLUTION INTRAMUSCULAR AS NEEDED
Status: DISCONTINUED | OUTPATIENT
Start: 2017-01-01 | End: 2017-01-01 | Stop reason: HOSPADM

## 2017-01-01 RX ORDER — AMLODIPINE BESYLATE 10 MG/1
10 TABLET ORAL NIGHTLY
COMMUNITY
End: 2017-01-01 | Stop reason: HOSPADM

## 2017-01-01 RX ORDER — DILTIAZEM HYDROCHLORIDE 120 MG/1
120 CAPSULE, COATED, EXTENDED RELEASE ORAL
Status: DISCONTINUED | OUTPATIENT
Start: 2017-01-01 | End: 2017-01-01 | Stop reason: HOSPADM

## 2017-01-01 RX ORDER — SODIUM CHLORIDE, SODIUM LACTATE, POTASSIUM CHLORIDE, CALCIUM CHLORIDE 600; 310; 30; 20 MG/100ML; MG/100ML; MG/100ML; MG/100ML
9 INJECTION, SOLUTION INTRAVENOUS CONTINUOUS
Status: DISCONTINUED | OUTPATIENT
Start: 2017-01-01 | End: 2017-01-01 | Stop reason: HOSPADM

## 2017-01-01 RX ORDER — FLUMAZENIL 0.1 MG/ML
0.2 INJECTION INTRAVENOUS AS NEEDED
Status: DISCONTINUED | OUTPATIENT
Start: 2017-01-01 | End: 2017-01-01 | Stop reason: HOSPADM

## 2017-01-01 RX ORDER — ACETAMINOPHEN AND CODEINE PHOSPHATE 300; 30 MG/1; MG/1
1 TABLET ORAL EVERY 6 HOURS PRN
Status: CANCELLED | OUTPATIENT
Start: 2017-01-01

## 2017-01-01 RX ORDER — ACETAMINOPHEN AND CODEINE PHOSPHATE 300; 30 MG/1; MG/1
1 TABLET ORAL EVERY 6 HOURS PRN
Status: DISCONTINUED | OUTPATIENT
Start: 2017-01-01 | End: 2017-01-01 | Stop reason: HOSPADM

## 2017-01-01 RX ORDER — DIPHENHYDRAMINE HYDROCHLORIDE 50 MG/ML
12.5 INJECTION INTRAMUSCULAR; INTRAVENOUS
Status: DISCONTINUED | OUTPATIENT
Start: 2017-01-01 | End: 2017-01-01 | Stop reason: HOSPADM

## 2017-01-01 RX ORDER — POTASSIUM CHLORIDE 20 MEQ/1
20 TABLET, EXTENDED RELEASE ORAL ONCE
Status: COMPLETED | OUTPATIENT
Start: 2017-01-01 | End: 2017-01-01

## 2017-01-01 RX ORDER — WARFARIN SODIUM 2.5 MG/1
TABLET ORAL
Qty: 35 TABLET | Refills: 5 | Status: SHIPPED | OUTPATIENT
Start: 2017-01-01 | End: 2017-01-01 | Stop reason: SDUPTHER

## 2017-01-01 RX ORDER — ACETAMINOPHEN 325 MG/1
650 TABLET ORAL ONCE
Status: DISCONTINUED | OUTPATIENT
Start: 2017-01-01 | End: 2017-01-01 | Stop reason: HOSPADM

## 2017-01-01 RX ORDER — ACETAMINOPHEN AND CODEINE PHOSPHATE 300; 30 MG/1; MG/1
1 TABLET ORAL EVERY 6 HOURS PRN
COMMUNITY
End: 2017-01-01 | Stop reason: HOSPADM

## 2017-01-01 RX ORDER — HEPARIN SODIUM 1000 [USP'U]/ML
4100 INJECTION, SOLUTION INTRAVENOUS; SUBCUTANEOUS AS NEEDED
Status: DISCONTINUED | OUTPATIENT
Start: 2017-01-01 | End: 2017-01-01 | Stop reason: HOSPADM

## 2017-01-01 RX ORDER — MAGNESIUM SULFATE 1 G/100ML
1 INJECTION INTRAVENOUS ONCE
Status: COMPLETED | OUTPATIENT
Start: 2017-01-01 | End: 2017-01-01

## 2017-01-01 RX ORDER — RANOLAZINE 500 MG/1
500 TABLET, EXTENDED RELEASE ORAL 2 TIMES DAILY
COMMUNITY
End: 2017-01-01 | Stop reason: HOSPADM

## 2017-01-01 RX ORDER — SODIUM CHLORIDE 0.9 % (FLUSH) 0.9 %
1-10 SYRINGE (ML) INJECTION AS NEEDED
Status: DISCONTINUED | OUTPATIENT
Start: 2017-01-01 | End: 2017-01-01 | Stop reason: HOSPADM

## 2017-01-01 RX ORDER — HYDROCODONE BITARTRATE AND ACETAMINOPHEN 5; 325 MG/1; MG/1
1 TABLET ORAL ONCE AS NEEDED
Status: CANCELLED | OUTPATIENT
Start: 2017-01-01 | End: 2017-01-01

## 2017-01-01 RX ORDER — ACETAMINOPHEN 650 MG/1
650 SUPPOSITORY RECTAL ONCE AS NEEDED
Status: DISCONTINUED | OUTPATIENT
Start: 2017-01-01 | End: 2017-01-01 | Stop reason: HOSPADM

## 2017-01-01 RX ORDER — HEPARIN SODIUM 1000 [USP'U]/ML
5000 INJECTION, SOLUTION INTRAVENOUS; SUBCUTANEOUS AS NEEDED
Status: DISCONTINUED | OUTPATIENT
Start: 2017-01-01 | End: 2017-01-01

## 2017-01-01 RX ORDER — FUROSEMIDE 10 MG/ML
40 INJECTION INTRAMUSCULAR; INTRAVENOUS DAILY
Status: DISCONTINUED | OUTPATIENT
Start: 2017-01-01 | End: 2017-01-01

## 2017-01-01 RX ORDER — ESMOLOL HYDROCHLORIDE 10 MG/ML
INJECTION INTRAVENOUS AS NEEDED
Status: DISCONTINUED | OUTPATIENT
Start: 2017-01-01 | End: 2017-01-01 | Stop reason: SURG

## 2017-01-01 RX ORDER — FUROSEMIDE 10 MG/ML
40 INJECTION INTRAMUSCULAR; INTRAVENOUS 2 TIMES DAILY
Status: DISCONTINUED | OUTPATIENT
Start: 2017-01-01 | End: 2017-01-01

## 2017-01-01 RX ORDER — FUROSEMIDE 10 MG/ML
40 INJECTION INTRAMUSCULAR; INTRAVENOUS ONCE
Status: COMPLETED | OUTPATIENT
Start: 2017-01-01 | End: 2017-01-01

## 2017-01-01 RX ORDER — ACETAMINOPHEN AND CODEINE PHOSPHATE 300; 30 MG/1; MG/1
1 TABLET ORAL EVERY 4 HOURS PRN
Status: DISCONTINUED | OUTPATIENT
Start: 2017-01-01 | End: 2017-01-01 | Stop reason: HOSPADM

## 2017-01-01 RX ORDER — ALBUMIN, HUMAN INJ 5% 5 %
12.5 SOLUTION INTRAVENOUS ONCE
Status: DISCONTINUED | OUTPATIENT
Start: 2017-01-01 | End: 2017-01-01 | Stop reason: ALTCHOICE

## 2017-01-01 RX ORDER — LORAZEPAM 1 MG/1
1 TABLET ORAL EVERY 6 HOURS PRN
Qty: 10 TABLET | Refills: 0 | Status: SHIPPED | OUTPATIENT
Start: 2017-01-01

## 2017-01-01 RX ORDER — ALBUMIN (HUMAN) 12.5 G/50ML
25 SOLUTION INTRAVENOUS AS NEEDED
Status: ACTIVE | OUTPATIENT
Start: 2017-01-01 | End: 2017-01-01

## 2017-01-01 RX ORDER — WARFARIN SODIUM 2.5 MG/1
2.5 TABLET ORAL
Status: DISCONTINUED | OUTPATIENT
Start: 2017-01-01 | End: 2017-01-01

## 2017-01-01 RX ORDER — AMLODIPINE BESYLATE 5 MG/1
5 TABLET ORAL NIGHTLY
Status: DISCONTINUED | OUTPATIENT
Start: 2017-01-01 | End: 2017-01-01

## 2017-01-01 RX ORDER — RANOLAZINE 500 MG/1
500 TABLET, EXTENDED RELEASE ORAL 2 TIMES DAILY
Status: DISCONTINUED | OUTPATIENT
Start: 2017-01-01 | End: 2017-01-01 | Stop reason: HOSPADM

## 2017-01-01 RX ORDER — CARVEDILOL 3.12 MG/1
6.25 TABLET ORAL 2 TIMES DAILY WITH MEALS
COMMUNITY
End: 2017-01-01 | Stop reason: HOSPADM

## 2017-01-01 RX ORDER — HEPARIN SODIUM 5000 [USP'U]/ML
5000 INJECTION, SOLUTION INTRAVENOUS; SUBCUTANEOUS EVERY 8 HOURS SCHEDULED
Status: DISCONTINUED | OUTPATIENT
Start: 2017-01-01 | End: 2017-01-01 | Stop reason: HOSPADM

## 2017-01-01 RX ORDER — COLCHICINE 0.6 MG/1
0.6 TABLET ORAL DAILY
Status: DISCONTINUED | OUTPATIENT
Start: 2017-01-01 | End: 2017-01-01 | Stop reason: HOSPADM

## 2017-01-01 RX ORDER — SENNA AND DOCUSATE SODIUM 50; 8.6 MG/1; MG/1
2 TABLET, FILM COATED ORAL NIGHTLY
Status: DISCONTINUED | OUTPATIENT
Start: 2017-01-01 | End: 2017-01-01 | Stop reason: HOSPADM

## 2017-01-01 RX ORDER — AZITHROMYCIN 250 MG/1
500 TABLET, FILM COATED ORAL ONCE
Status: COMPLETED | OUTPATIENT
Start: 2017-01-01 | End: 2017-01-01

## 2017-01-01 RX ORDER — WARFARIN SODIUM 2.5 MG/1
2.5 TABLET ORAL
Status: DISCONTINUED | OUTPATIENT
Start: 2017-01-01 | End: 2017-01-01 | Stop reason: DRUGHIGH

## 2017-01-01 RX ORDER — HEPARIN SODIUM 1000 [USP'U]/ML
4100 INJECTION, SOLUTION INTRAVENOUS; SUBCUTANEOUS DAILY PRN
Status: DISCONTINUED | OUTPATIENT
Start: 2017-01-01 | End: 2017-01-01 | Stop reason: HOSPADM

## 2017-01-01 RX ORDER — WARFARIN SODIUM 2.5 MG/1
TABLET ORAL
Qty: 100 TABLET | Refills: 1 | Status: SHIPPED | OUTPATIENT
Start: 2017-01-01 | End: 2017-01-01 | Stop reason: HOSPADM

## 2017-01-01 RX ORDER — CARVEDILOL 6.25 MG/1
6.25 TABLET ORAL 2 TIMES DAILY WITH MEALS
Status: DISCONTINUED | OUTPATIENT
Start: 2017-01-01 | End: 2017-01-01

## 2017-01-01 RX ORDER — ONDANSETRON 2 MG/ML
4 INJECTION INTRAMUSCULAR; INTRAVENOUS ONCE AS NEEDED
Status: DISCONTINUED | OUTPATIENT
Start: 2017-01-01 | End: 2017-01-01 | Stop reason: HOSPADM

## 2017-01-01 RX ORDER — SODIUM CHLORIDE 9 MG/ML
INJECTION, SOLUTION INTRAVENOUS
Status: DISPENSED
Start: 2017-01-01 | End: 2017-01-01

## 2017-01-01 RX ORDER — SODIUM CHLORIDE 0.9 % (FLUSH) 0.9 %
1-10 SYRINGE (ML) INJECTION AS NEEDED
Status: CANCELLED | OUTPATIENT
Start: 2017-01-01

## 2017-01-01 RX ORDER — DILTIAZEM HYDROCHLORIDE 60 MG/1
60 TABLET, FILM COATED ORAL EVERY 6 HOURS SCHEDULED
Status: DISCONTINUED | OUTPATIENT
Start: 2017-01-01 | End: 2017-01-01

## 2017-01-01 RX ORDER — AZITHROMYCIN 250 MG/1
250 TABLET, FILM COATED ORAL EVERY 24 HOURS
Status: DISCONTINUED | OUTPATIENT
Start: 2017-01-01 | End: 2017-01-01 | Stop reason: HOSPADM

## 2017-01-01 RX ORDER — POLYETHYLENE GLYCOL 3350 17 G/17G
17 POWDER, FOR SOLUTION ORAL DAILY
Status: DISCONTINUED | OUTPATIENT
Start: 2017-01-01 | End: 2017-01-01 | Stop reason: HOSPADM

## 2017-01-01 RX ORDER — RANOLAZINE 500 MG/1
500 TABLET, EXTENDED RELEASE ORAL 2 TIMES DAILY
Status: CANCELLED | OUTPATIENT
Start: 2017-01-01

## 2017-01-01 RX ORDER — ASPIRIN 325 MG
325 TABLET ORAL ONCE
Status: DISCONTINUED | OUTPATIENT
Start: 2017-01-01 | End: 2017-01-01

## 2017-01-01 RX ORDER — MIDAZOLAM HYDROCHLORIDE 1 MG/ML
INJECTION INTRAMUSCULAR; INTRAVENOUS AS NEEDED
Status: DISCONTINUED | OUTPATIENT
Start: 2017-01-01 | End: 2017-01-01 | Stop reason: SURG

## 2017-01-01 RX ORDER — FUROSEMIDE 40 MG/1
40 TABLET ORAL DAILY
Status: DISCONTINUED | OUTPATIENT
Start: 2017-01-01 | End: 2017-01-01 | Stop reason: HOSPADM

## 2017-01-01 RX ORDER — ACETAMINOPHEN 325 MG/1
650 TABLET ORAL ONCE AS NEEDED
Status: DISCONTINUED | OUTPATIENT
Start: 2017-01-01 | End: 2017-01-01 | Stop reason: HOSPADM

## 2017-01-01 RX ORDER — DILTIAZEM HYDROCHLORIDE 120 MG/1
120 CAPSULE, COATED, EXTENDED RELEASE ORAL DAILY
Status: CANCELLED | OUTPATIENT
Start: 2017-01-01

## 2017-01-01 RX ORDER — LABETALOL HYDROCHLORIDE 5 MG/ML
5 INJECTION, SOLUTION INTRAVENOUS
Status: DISCONTINUED | OUTPATIENT
Start: 2017-01-01 | End: 2017-01-01 | Stop reason: HOSPADM

## 2017-01-01 RX ORDER — WARFARIN SODIUM 2 MG/1
2 TABLET ORAL
Status: DISCONTINUED | OUTPATIENT
Start: 2017-01-01 | End: 2017-01-01

## 2017-01-01 RX ORDER — ONDANSETRON 4 MG/1
4 TABLET, FILM COATED ORAL ONCE AS NEEDED
Status: DISCONTINUED | OUTPATIENT
Start: 2017-01-01 | End: 2017-01-01 | Stop reason: HOSPADM

## 2017-01-01 RX ORDER — MORPHINE SULFATE 100 MG/5ML
5 SOLUTION ORAL EVERY 4 HOURS PRN
Qty: 15 ML | Refills: 0 | Status: SHIPPED | OUTPATIENT
Start: 2017-01-01

## 2017-01-01 RX ORDER — SODIUM CHLORIDE 9 MG/ML
125 INJECTION, SOLUTION INTRAVENOUS CONTINUOUS
Status: DISCONTINUED | OUTPATIENT
Start: 2017-01-01 | End: 2017-01-01

## 2017-01-01 RX ORDER — FUROSEMIDE 40 MG/1
40 TABLET ORAL DAILY
Qty: 30 TABLET | Refills: 0 | Status: ON HOLD | OUTPATIENT
Start: 2017-01-01 | End: 2017-01-01 | Stop reason: ALTCHOICE

## 2017-01-01 RX ORDER — CARVEDILOL 12.5 MG/1
12.5 TABLET ORAL 2 TIMES DAILY WITH MEALS
Qty: 60 TABLET | Refills: 0 | Status: SHIPPED | OUTPATIENT
Start: 2017-01-01 | End: 2017-01-01 | Stop reason: HOSPADM

## 2017-01-01 RX ORDER — PROTAMINE SULFATE 10 MG/ML
INJECTION, SOLUTION INTRAVENOUS AS NEEDED
Status: DISCONTINUED | OUTPATIENT
Start: 2017-01-01 | End: 2017-01-01 | Stop reason: SURG

## 2017-01-01 RX ADMIN — HEPARIN SODIUM 5000 UNITS: 5000 INJECTION, SOLUTION INTRAVENOUS; SUBCUTANEOUS at 05:43

## 2017-01-01 RX ADMIN — FUROSEMIDE 40 MG: 10 INJECTION, SOLUTION INTRAMUSCULAR; INTRAVENOUS at 17:05

## 2017-01-01 RX ADMIN — WARFARIN SODIUM 2 MG: 2 TABLET ORAL at 17:16

## 2017-01-01 RX ADMIN — RANOLAZINE 500 MG: 500 TABLET, FILM COATED, EXTENDED RELEASE ORAL at 17:21

## 2017-01-01 RX ADMIN — MIDAZOLAM 1 MG: 1 INJECTION INTRAMUSCULAR; INTRAVENOUS at 10:30

## 2017-01-01 RX ADMIN — CARVEDILOL 12.5 MG: 12.5 TABLET, FILM COATED ORAL at 17:16

## 2017-01-01 RX ADMIN — HEPARIN SODIUM 5000 UNITS: 5000 INJECTION, SOLUTION INTRAVENOUS; SUBCUTANEOUS at 14:50

## 2017-01-01 RX ADMIN — ACETAMINOPHEN AND CODEINE PHOSPHATE 1 TABLET: 30; 300 TABLET ORAL at 21:29

## 2017-01-01 RX ADMIN — BISACODYL 10 MG: 5 TABLET, COATED ORAL at 13:20

## 2017-01-01 RX ADMIN — FUROSEMIDE 40 MG: 10 INJECTION, SOLUTION INTRAMUSCULAR; INTRAVENOUS at 09:15

## 2017-01-01 RX ADMIN — RANOLAZINE 500 MG: 500 TABLET, FILM COATED, EXTENDED RELEASE ORAL at 18:34

## 2017-01-01 RX ADMIN — RANOLAZINE 500 MG: 500 TABLET, FILM COATED, EXTENDED RELEASE ORAL at 17:16

## 2017-01-01 RX ADMIN — CARVEDILOL 12.5 MG: 12.5 TABLET, FILM COATED ORAL at 12:51

## 2017-01-01 RX ADMIN — AZITHROMYCIN 500 MG: 500 INJECTION, POWDER, LYOPHILIZED, FOR SOLUTION INTRAVENOUS at 08:07

## 2017-01-01 RX ADMIN — PHENYLEPHRINE HYDROCHLORIDE 100 MCG: 10 INJECTION INTRAVENOUS at 11:25

## 2017-01-01 RX ADMIN — RANOLAZINE 500 MG: 500 TABLET, FILM COATED, EXTENDED RELEASE ORAL at 17:02

## 2017-01-01 RX ADMIN — HEPARIN SODIUM 4100 UNITS: 1000 INJECTION, SOLUTION INTRAVENOUS; SUBCUTANEOUS at 10:40

## 2017-01-01 RX ADMIN — FUROSEMIDE 40 MG: 10 INJECTION, SOLUTION INTRAMUSCULAR; INTRAVENOUS at 18:05

## 2017-01-01 RX ADMIN — HEPARIN SODIUM 5000 UNITS: 1000 INJECTION, SOLUTION INTRAVENOUS; SUBCUTANEOUS at 11:21

## 2017-01-01 RX ADMIN — DILTIAZEM HYDROCHLORIDE 60 MG: 60 TABLET, FILM COATED ORAL at 11:01

## 2017-01-01 RX ADMIN — AMLODIPINE BESYLATE 5 MG: 5 TABLET ORAL at 20:39

## 2017-01-01 RX ADMIN — ACETAMINOPHEN AND CODEINE PHOSPHATE 1 TABLET: 30; 300 TABLET ORAL at 21:42

## 2017-01-01 RX ADMIN — Medication: at 09:50

## 2017-01-01 RX ADMIN — ALBUMIN (HUMAN) 12.5 G: 0.25 INJECTION, SOLUTION INTRAVENOUS at 00:21

## 2017-01-01 RX ADMIN — POTASSIUM CHLORIDE 20 MEQ: 20 TABLET, EXTENDED RELEASE ORAL at 17:32

## 2017-01-01 RX ADMIN — RANOLAZINE 500 MG: 500 TABLET, FILM COATED, EXTENDED RELEASE ORAL at 17:23

## 2017-01-01 RX ADMIN — FUROSEMIDE 40 MG: 10 INJECTION, SOLUTION INTRAMUSCULAR; INTRAVENOUS at 14:57

## 2017-01-01 RX ADMIN — DILTIAZEM HYDROCHLORIDE 60 MG: 60 TABLET, FILM COATED ORAL at 17:16

## 2017-01-01 RX ADMIN — CARVEDILOL 6.25 MG: 6.25 TABLET, FILM COATED ORAL at 17:05

## 2017-01-01 RX ADMIN — AMLODIPINE BESYLATE 10 MG: 10 TABLET ORAL at 20:12

## 2017-01-01 RX ADMIN — HEPARIN SODIUM 5000 UNITS: 5000 INJECTION, SOLUTION INTRAVENOUS; SUBCUTANEOUS at 23:52

## 2017-01-01 RX ADMIN — CARVEDILOL 6.25 MG: 6.25 TABLET, FILM COATED ORAL at 08:36

## 2017-01-01 RX ADMIN — CARVEDILOL 6.25 MG: 6.25 TABLET, FILM COATED ORAL at 18:34

## 2017-01-01 RX ADMIN — RANOLAZINE 500 MG: 500 TABLET, FILM COATED, EXTENDED RELEASE ORAL at 17:05

## 2017-01-01 RX ADMIN — CARVEDILOL 6.25 MG: 6.25 TABLET, FILM COATED ORAL at 08:54

## 2017-01-01 RX ADMIN — DILTIAZEM HYDROCHLORIDE 60 MG: 60 TABLET, FILM COATED ORAL at 06:08

## 2017-01-01 RX ADMIN — AMLODIPINE BESYLATE 10 MG: 10 TABLET ORAL at 20:51

## 2017-01-01 RX ADMIN — SENNOSIDES AND DOCUSATE SODIUM 2 TABLET: 8.6; 5 TABLET ORAL at 21:17

## 2017-01-01 RX ADMIN — AZITHROMYCIN 500 MG: 250 TABLET, FILM COATED ORAL at 21:32

## 2017-01-01 RX ADMIN — CARVEDILOL 6.25 MG: 6.25 TABLET, FILM COATED ORAL at 08:20

## 2017-01-01 RX ADMIN — CEFAZOLIN SODIUM 2 G: 10 INJECTION, POWDER, FOR SOLUTION INTRAVENOUS at 10:51

## 2017-01-01 RX ADMIN — Medication 10 ML: at 15:38

## 2017-01-01 RX ADMIN — HEPARIN SODIUM 7000 UNITS: 5000 INJECTION, SOLUTION INTRAVENOUS; SUBCUTANEOUS at 11:45

## 2017-01-01 RX ADMIN — POLYETHYLENE GLYCOL 3350 17 G: 17 POWDER, FOR SOLUTION ORAL at 09:41

## 2017-01-01 RX ADMIN — SENNOSIDES AND DOCUSATE SODIUM 2 TABLET: 8.6; 5 TABLET ORAL at 21:51

## 2017-01-01 RX ADMIN — FUROSEMIDE 40 MG: 10 INJECTION, SOLUTION INTRAMUSCULAR; INTRAVENOUS at 17:09

## 2017-01-01 RX ADMIN — PHENYLEPHRINE HYDROCHLORIDE 100 MCG: 10 INJECTION INTRAVENOUS at 11:35

## 2017-01-01 RX ADMIN — PHENYLEPHRINE HYDROCHLORIDE 100 MCG: 10 INJECTION INTRAVENOUS at 11:05

## 2017-01-01 RX ADMIN — CEFTRIAXONE 1 G: 1 INJECTION, POWDER, FOR SOLUTION INTRAMUSCULAR; INTRAVENOUS at 10:05

## 2017-01-01 RX ADMIN — CARVEDILOL 12.5 MG: 12.5 TABLET, FILM COATED ORAL at 19:02

## 2017-01-01 RX ADMIN — RANOLAZINE 500 MG: 500 TABLET, FILM COATED, EXTENDED RELEASE ORAL at 17:09

## 2017-01-01 RX ADMIN — CARVEDILOL 6.25 MG: 6.25 TABLET, FILM COATED ORAL at 18:03

## 2017-01-01 RX ADMIN — HEPARIN SODIUM 5000 UNITS: 5000 INJECTION, SOLUTION INTRAVENOUS; SUBCUTANEOUS at 21:18

## 2017-01-01 RX ADMIN — CARVEDILOL 6.25 MG: 6.25 TABLET, FILM COATED ORAL at 17:16

## 2017-01-01 RX ADMIN — ESMOLOL HYDROCHLORIDE 10 MG: 10 INJECTION, SOLUTION INTRAVENOUS at 10:40

## 2017-01-01 RX ADMIN — PHENYLEPHRINE HYDROCHLORIDE 200 MCG: 10 INJECTION INTRAVENOUS at 12:07

## 2017-01-01 RX ADMIN — FUROSEMIDE 40 MG: 10 INJECTION, SOLUTION INTRAMUSCULAR; INTRAVENOUS at 17:02

## 2017-01-01 RX ADMIN — AMLODIPINE BESYLATE 10 MG: 10 TABLET ORAL at 21:45

## 2017-01-01 RX ADMIN — FUROSEMIDE 40 MG: 10 INJECTION, SOLUTION INTRAMUSCULAR; INTRAVENOUS at 09:52

## 2017-01-01 RX ADMIN — RANOLAZINE 500 MG: 500 TABLET, FILM COATED, EXTENDED RELEASE ORAL at 08:45

## 2017-01-01 RX ADMIN — MAGNESIUM SULFATE HEPTAHYDRATE 1 G: 1 INJECTION, SOLUTION INTRAVENOUS at 18:11

## 2017-01-01 RX ADMIN — CARVEDILOL 6.25 MG: 6.25 TABLET, FILM COATED ORAL at 18:27

## 2017-01-01 RX ADMIN — PROPOFOL 80 MG: 10 INJECTION, EMULSION INTRAVENOUS at 10:47

## 2017-01-01 RX ADMIN — SENNOSIDES AND DOCUSATE SODIUM 2 TABLET: 8.6; 5 TABLET ORAL at 21:52

## 2017-01-01 RX ADMIN — ISOSORBIDE MONONITRATE 30 MG: 30 TABLET, EXTENDED RELEASE ORAL at 09:51

## 2017-01-01 RX ADMIN — AMLODIPINE BESYLATE 10 MG: 10 TABLET ORAL at 20:13

## 2017-01-01 RX ADMIN — CARVEDILOL 6.25 MG: 6.25 TABLET, FILM COATED ORAL at 17:21

## 2017-01-01 RX ADMIN — CARVEDILOL 6.25 MG: 6.25 TABLET, FILM COATED ORAL at 17:09

## 2017-01-01 RX ADMIN — ACETAMINOPHEN AND CODEINE PHOSPHATE 1 TABLET: 30; 300 TABLET ORAL at 03:56

## 2017-01-01 RX ADMIN — SODIUM CHLORIDE 30 ML/HR: 9 INJECTION, SOLUTION INTRAVENOUS at 07:44

## 2017-01-01 RX ADMIN — AMLODIPINE BESYLATE 5 MG: 5 TABLET ORAL at 21:52

## 2017-01-01 RX ADMIN — AMLODIPINE BESYLATE 10 MG: 10 TABLET ORAL at 22:09

## 2017-01-01 RX ADMIN — HEPARIN SODIUM 5000 UNITS: 5000 INJECTION, SOLUTION INTRAVENOUS; SUBCUTANEOUS at 22:04

## 2017-01-01 RX ADMIN — CARVEDILOL 12.5 MG: 12.5 TABLET, FILM COATED ORAL at 10:56

## 2017-01-01 RX ADMIN — ISOSORBIDE MONONITRATE 30 MG: 30 TABLET, EXTENDED RELEASE ORAL at 09:15

## 2017-01-01 RX ADMIN — PHENYLEPHRINE HYDROCHLORIDE 100 MCG: 10 INJECTION INTRAVENOUS at 11:20

## 2017-01-01 RX ADMIN — PHENYLEPHRINE HYDROCHLORIDE 100 MCG: 10 INJECTION INTRAVENOUS at 11:55

## 2017-01-01 RX ADMIN — ISOSORBIDE MONONITRATE 30 MG: 30 TABLET, EXTENDED RELEASE ORAL at 08:08

## 2017-01-01 RX ADMIN — MORPHINE SULFATE 2 MG: 2 INJECTION, SOLUTION INTRAMUSCULAR; INTRAVENOUS at 08:13

## 2017-01-01 RX ADMIN — RANOLAZINE 500 MG: 500 TABLET, FILM COATED, EXTENDED RELEASE ORAL at 09:41

## 2017-01-01 RX ADMIN — PHENYLEPHRINE HYDROCHLORIDE 200 MCG: 10 INJECTION INTRAVENOUS at 11:53

## 2017-01-01 RX ADMIN — RANOLAZINE 500 MG: 500 TABLET, FILM COATED, EXTENDED RELEASE ORAL at 08:35

## 2017-01-01 RX ADMIN — FUROSEMIDE 40 MG: 10 INJECTION, SOLUTION INTRAMUSCULAR; INTRAVENOUS at 08:54

## 2017-01-01 RX ADMIN — HEPARIN SODIUM 4100 UNITS: 1000 INJECTION, SOLUTION INTRAVENOUS; SUBCUTANEOUS at 16:00

## 2017-01-01 RX ADMIN — AZITHROMYCIN 500 MG: 500 INJECTION, POWDER, LYOPHILIZED, FOR SOLUTION INTRAVENOUS at 14:12

## 2017-01-01 RX ADMIN — AZITHROMYCIN 500 MG: 500 INJECTION, POWDER, LYOPHILIZED, FOR SOLUTION INTRAVENOUS at 10:30

## 2017-01-01 RX ADMIN — HEPARIN SODIUM 5000 UNITS: 5000 INJECTION, SOLUTION INTRAVENOUS; SUBCUTANEOUS at 05:37

## 2017-01-01 RX ADMIN — CEFTRIAXONE 1 G: 1 INJECTION, POWDER, FOR SOLUTION INTRAMUSCULAR; INTRAVENOUS at 10:28

## 2017-01-01 RX ADMIN — FUROSEMIDE 40 MG: 10 INJECTION, SOLUTION INTRAMUSCULAR; INTRAVENOUS at 08:49

## 2017-01-01 RX ADMIN — FUROSEMIDE 40 MG: 10 INJECTION, SOLUTION INTRAMUSCULAR; INTRAVENOUS at 18:34

## 2017-01-01 RX ADMIN — ESMOLOL HYDROCHLORIDE 10 MG: 10 INJECTION, SOLUTION INTRAVENOUS at 12:20

## 2017-01-01 RX ADMIN — ISOSORBIDE MONONITRATE 30 MG: 30 TABLET, EXTENDED RELEASE ORAL at 08:53

## 2017-01-01 RX ADMIN — HEPARIN SODIUM 5000 UNITS: 5000 INJECTION, SOLUTION INTRAVENOUS; SUBCUTANEOUS at 13:20

## 2017-01-01 RX ADMIN — POLYETHYLENE GLYCOL 3350 17 G: 17 POWDER, FOR SOLUTION ORAL at 09:47

## 2017-01-01 RX ADMIN — AZITHROMYCIN 500 MG: 500 INJECTION, POWDER, LYOPHILIZED, FOR SOLUTION INTRAVENOUS at 08:20

## 2017-01-01 RX ADMIN — ACETAMINOPHEN AND CODEINE PHOSPHATE 1 TABLET: 30; 300 TABLET ORAL at 10:44

## 2017-01-01 RX ADMIN — SODIUM CHLORIDE 250 ML: 9 INJECTION, SOLUTION INTRAVENOUS at 00:22

## 2017-01-01 RX ADMIN — Medication: at 17:45

## 2017-01-01 RX ADMIN — CARVEDILOL 6.25 MG: 6.25 TABLET, FILM COATED ORAL at 08:45

## 2017-01-01 RX ADMIN — RANOLAZINE 500 MG: 500 TABLET, FILM COATED, EXTENDED RELEASE ORAL at 08:36

## 2017-01-01 RX ADMIN — PHYTONADIONE 10 MG: 10 INJECTION, EMULSION INTRAMUSCULAR; INTRAVENOUS; SUBCUTANEOUS at 17:30

## 2017-01-01 RX ADMIN — FUROSEMIDE 40 MG: 10 INJECTION, SOLUTION INTRAMUSCULAR; INTRAVENOUS at 08:35

## 2017-01-01 RX ADMIN — RANOLAZINE 500 MG: 500 TABLET, FILM COATED, EXTENDED RELEASE ORAL at 12:51

## 2017-01-01 RX ADMIN — AZITHROMYCIN 500 MG: 500 INJECTION, POWDER, LYOPHILIZED, FOR SOLUTION INTRAVENOUS at 10:53

## 2017-01-01 RX ADMIN — ESMOLOL HYDROCHLORIDE 10 MG: 10 INJECTION, SOLUTION INTRAVENOUS at 10:45

## 2017-01-01 RX ADMIN — AMLODIPINE BESYLATE 5 MG: 5 TABLET ORAL at 21:18

## 2017-01-01 RX ADMIN — RANOLAZINE 500 MG: 500 TABLET, FILM COATED, EXTENDED RELEASE ORAL at 08:08

## 2017-01-01 RX ADMIN — CARVEDILOL 6.25 MG: 6.25 TABLET, FILM COATED ORAL at 09:41

## 2017-01-01 RX ADMIN — Medication: at 18:41

## 2017-01-01 RX ADMIN — FUROSEMIDE 40 MG: 40 TABLET ORAL at 10:55

## 2017-01-01 RX ADMIN — CARVEDILOL 6.25 MG: 6.25 TABLET, FILM COATED ORAL at 17:02

## 2017-01-01 RX ADMIN — ACETAMINOPHEN AND CODEINE PHOSPHATE 1 TABLET: 30; 300 TABLET ORAL at 12:52

## 2017-01-01 RX ADMIN — PHENYLEPHRINE HYDROCHLORIDE 200 MCG: 10 INJECTION INTRAVENOUS at 12:13

## 2017-01-01 RX ADMIN — PROTAMINE SULFATE 20 MG: 10 INJECTION, SOLUTION INTRAVENOUS at 12:33

## 2017-01-01 RX ADMIN — CEFTRIAXONE 1 G: 1 INJECTION, POWDER, FOR SOLUTION INTRAMUSCULAR; INTRAVENOUS at 12:59

## 2017-01-01 RX ADMIN — POLYETHYLENE GLYCOL 3350 17 G: 17 POWDER, FOR SOLUTION ORAL at 08:54

## 2017-01-01 RX ADMIN — SODIUM CHLORIDE 125 ML/HR: 9 INJECTION, SOLUTION INTRAVENOUS at 13:19

## 2017-01-01 RX ADMIN — ACETAMINOPHEN AND CODEINE PHOSPHATE 1 TABLET: 30; 300 TABLET ORAL at 13:33

## 2017-01-01 RX ADMIN — WARFARIN SODIUM 2 MG: 2 TABLET ORAL at 18:03

## 2017-01-01 RX ADMIN — CEFTRIAXONE 1 G: 1 INJECTION, POWDER, FOR SOLUTION INTRAMUSCULAR; INTRAVENOUS at 09:47

## 2017-01-01 RX ADMIN — FUROSEMIDE 40 MG: 10 INJECTION, SOLUTION INTRAMUSCULAR; INTRAVENOUS at 17:21

## 2017-01-01 RX ADMIN — FUROSEMIDE 40 MG: 10 INJECTION, SOLUTION INTRAMUSCULAR; INTRAVENOUS at 08:20

## 2017-01-01 RX ADMIN — PHENYLEPHRINE HYDROCHLORIDE 100 MCG: 10 INJECTION INTRAVENOUS at 13:00

## 2017-01-01 RX ADMIN — HEPARIN SODIUM 5000 UNITS: 5000 INJECTION, SOLUTION INTRAVENOUS; SUBCUTANEOUS at 05:57

## 2017-01-01 RX ADMIN — RANOLAZINE 500 MG: 500 TABLET, FILM COATED, EXTENDED RELEASE ORAL at 09:51

## 2017-01-01 RX ADMIN — HEPARIN SODIUM 5000 UNITS: 5000 INJECTION, SOLUTION INTRAVENOUS; SUBCUTANEOUS at 06:08

## 2017-01-01 RX ADMIN — CARVEDILOL 12.5 MG: 12.5 TABLET, FILM COATED ORAL at 18:11

## 2017-01-01 RX ADMIN — ISOSORBIDE MONONITRATE 30 MG: 30 TABLET, EXTENDED RELEASE ORAL at 08:45

## 2017-01-01 RX ADMIN — PHENYLEPHRINE HYDROCHLORIDE 100 MCG: 10 INJECTION INTRAVENOUS at 10:55

## 2017-01-01 RX ADMIN — FUROSEMIDE 40 MG: 40 TABLET ORAL at 19:01

## 2017-01-01 RX ADMIN — FUROSEMIDE 40 MG: 10 INJECTION, SOLUTION INTRAMUSCULAR; INTRAVENOUS at 17:16

## 2017-01-01 RX ADMIN — ACETAMINOPHEN AND CODEINE PHOSPHATE 1 TABLET: 30; 300 TABLET ORAL at 15:47

## 2017-01-01 RX ADMIN — CARVEDILOL 6.25 MG: 6.25 TABLET, FILM COATED ORAL at 09:14

## 2017-01-01 RX ADMIN — PHENYLEPHRINE HYDROCHLORIDE 100 MCG: 10 INJECTION INTRAVENOUS at 12:40

## 2017-01-01 RX ADMIN — AZITHROMYCIN 500 MG: 500 INJECTION, POWDER, LYOPHILIZED, FOR SOLUTION INTRAVENOUS at 08:50

## 2017-01-01 RX ADMIN — HEPARIN SODIUM 5000 UNITS: 5000 INJECTION, SOLUTION INTRAVENOUS; SUBCUTANEOUS at 13:02

## 2017-01-01 RX ADMIN — ISOSORBIDE MONONITRATE 30 MG: 30 TABLET, EXTENDED RELEASE ORAL at 09:41

## 2017-01-01 RX ADMIN — CEFTRIAXONE 1 G: 1 INJECTION, POWDER, FOR SOLUTION INTRAMUSCULAR; INTRAVENOUS at 10:06

## 2017-01-01 RX ADMIN — HEPARIN SODIUM 5000 UNITS: 5000 INJECTION, SOLUTION INTRAVENOUS; SUBCUTANEOUS at 21:52

## 2017-01-01 RX ADMIN — Medication 10 ML: at 08:14

## 2017-01-01 RX ADMIN — HEPARIN SODIUM 5000 UNITS: 5000 INJECTION, SOLUTION INTRAVENOUS; SUBCUTANEOUS at 21:50

## 2017-01-01 RX ADMIN — AMLODIPINE BESYLATE 10 MG: 10 TABLET ORAL at 21:51

## 2017-01-01 RX ADMIN — RANOLAZINE 500 MG: 500 TABLET, FILM COATED, EXTENDED RELEASE ORAL at 18:11

## 2017-01-01 RX ADMIN — Medication: at 10:02

## 2017-01-01 RX ADMIN — ISOSORBIDE MONONITRATE 30 MG: 30 TABLET, EXTENDED RELEASE ORAL at 12:51

## 2017-01-01 RX ADMIN — DILTIAZEM HYDROCHLORIDE 120 MG: 120 CAPSULE, COATED, EXTENDED RELEASE ORAL at 12:51

## 2017-01-01 RX ADMIN — CARVEDILOL 12.5 MG: 12.5 TABLET, FILM COATED ORAL at 17:24

## 2017-01-01 RX ADMIN — PHENYLEPHRINE HYDROCHLORIDE 100 MCG: 10 INJECTION INTRAVENOUS at 12:20

## 2017-01-01 RX ADMIN — CARVEDILOL 12.5 MG: 12.5 TABLET, FILM COATED ORAL at 08:35

## 2017-01-01 RX ADMIN — CARVEDILOL 6.25 MG: 6.25 TABLET, FILM COATED ORAL at 09:51

## 2017-01-01 RX ADMIN — ALBUMIN (HUMAN) 12.5 G: 0.25 INJECTION, SOLUTION INTRAVENOUS at 14:55

## 2017-01-01 RX ADMIN — POLYETHYLENE GLYCOL 3350 17 G: 17 POWDER, FOR SOLUTION ORAL at 08:36

## 2017-01-01 RX ADMIN — CARVEDILOL 6.25 MG: 6.25 TABLET, FILM COATED ORAL at 08:08

## 2017-01-01 RX ADMIN — CEFTRIAXONE 1 G: 1 INJECTION, POWDER, FOR SOLUTION INTRAMUSCULAR; INTRAVENOUS at 09:41

## 2017-01-01 RX ADMIN — DILTIAZEM HYDROCHLORIDE 120 MG: 120 CAPSULE, COATED, EXTENDED RELEASE ORAL at 10:55

## 2017-01-01 RX ADMIN — ISOSORBIDE MONONITRATE 30 MG: 30 TABLET, EXTENDED RELEASE ORAL at 08:36

## 2017-01-01 RX ADMIN — HEPARIN SODIUM 5000 UNITS: 5000 INJECTION, SOLUTION INTRAVENOUS; SUBCUTANEOUS at 14:58

## 2017-01-01 RX ADMIN — CEFTRIAXONE 1 G: 1 INJECTION, POWDER, FOR SOLUTION INTRAMUSCULAR; INTRAVENOUS at 12:35

## 2017-01-01 RX ADMIN — ESMOLOL HYDROCHLORIDE 10 MG: 10 INJECTION, SOLUTION INTRAVENOUS at 12:57

## 2017-01-01 RX ADMIN — DILTIAZEM HYDROCHLORIDE 60 MG: 60 TABLET, FILM COATED ORAL at 00:39

## 2017-01-01 RX ADMIN — PHENYLEPHRINE HYDROCHLORIDE 100 MCG: 10 INJECTION INTRAVENOUS at 11:41

## 2017-01-01 RX ADMIN — CEFTRIAXONE 1 G: 1 INJECTION, POWDER, FOR SOLUTION INTRAMUSCULAR; INTRAVENOUS at 21:33

## 2017-01-01 RX ADMIN — PHENYLEPHRINE HYDROCHLORIDE 100 MCG: 10 INJECTION INTRAVENOUS at 11:15

## 2017-01-01 RX ADMIN — PHENYLEPHRINE HYDROCHLORIDE 100 MCG: 10 INJECTION INTRAVENOUS at 13:05

## 2017-01-01 RX ADMIN — RANOLAZINE 500 MG: 500 TABLET, FILM COATED, EXTENDED RELEASE ORAL at 21:17

## 2017-01-01 RX ADMIN — CEFTRIAXONE 1 G: 1 INJECTION, POWDER, FOR SOLUTION INTRAMUSCULAR; INTRAVENOUS at 11:10

## 2017-01-01 RX ADMIN — CARVEDILOL 12.5 MG: 12.5 TABLET, FILM COATED ORAL at 13:00

## 2017-01-01 RX ADMIN — PHENYLEPHRINE HYDROCHLORIDE 100 MCG: 10 INJECTION INTRAVENOUS at 11:30

## 2017-01-01 RX ADMIN — COLCHICINE 0.6 MG: 0.6 TABLET, FILM COATED ORAL at 10:02

## 2017-01-01 RX ADMIN — RANOLAZINE 500 MG: 500 TABLET, FILM COATED, EXTENDED RELEASE ORAL at 08:20

## 2017-01-01 RX ADMIN — COLCHICINE 0.6 MG: 0.6 TABLET, FILM COATED ORAL at 12:52

## 2017-01-01 RX ADMIN — HEPARIN SODIUM 5000 UNITS: 5000 INJECTION, SOLUTION INTRAVENOUS; SUBCUTANEOUS at 22:16

## 2017-01-01 RX ADMIN — CEFTRIAXONE 1 G: 1 INJECTION, POWDER, FOR SOLUTION INTRAMUSCULAR; INTRAVENOUS at 08:36

## 2017-01-01 RX ADMIN — AMLODIPINE BESYLATE 10 MG: 10 TABLET ORAL at 22:16

## 2017-01-01 RX ADMIN — RANOLAZINE 500 MG: 500 TABLET, FILM COATED, EXTENDED RELEASE ORAL at 18:03

## 2017-01-01 RX ADMIN — MORPHINE SULFATE 2 MG: 2 INJECTION, SOLUTION INTRAMUSCULAR; INTRAVENOUS at 15:30

## 2017-01-01 RX ADMIN — FUROSEMIDE 40 MG: 10 INJECTION, SOLUTION INTRAMUSCULAR; INTRAVENOUS at 08:36

## 2017-01-01 RX ADMIN — DILTIAZEM HYDROCHLORIDE 60 MG: 60 TABLET, FILM COATED ORAL at 00:53

## 2017-01-01 RX ADMIN — PHENYLEPHRINE HYDROCHLORIDE 100 MCG: 10 INJECTION INTRAVENOUS at 12:35

## 2017-01-01 RX ADMIN — RANOLAZINE 500 MG: 500 TABLET, FILM COATED, EXTENDED RELEASE ORAL at 13:01

## 2017-01-01 RX ADMIN — DILTIAZEM HYDROCHLORIDE 60 MG: 60 TABLET, FILM COATED ORAL at 11:58

## 2017-01-01 RX ADMIN — AZITHROMYCIN 500 MG: 500 INJECTION, POWDER, LYOPHILIZED, FOR SOLUTION INTRAVENOUS at 11:32

## 2017-01-01 RX ADMIN — ISOSORBIDE MONONITRATE 30 MG: 30 TABLET, EXTENDED RELEASE ORAL at 10:55

## 2017-01-01 RX ADMIN — PROPOFOL 20 MG: 10 INJECTION, EMULSION INTRAVENOUS at 10:48

## 2017-01-01 RX ADMIN — POLYETHYLENE GLYCOL 3350 17 G: 17 POWDER, FOR SOLUTION ORAL at 08:35

## 2017-01-01 RX ADMIN — ISOSORBIDE MONONITRATE 30 MG: 30 TABLET, EXTENDED RELEASE ORAL at 13:03

## 2017-01-01 RX ADMIN — ISOSORBIDE MONONITRATE 30 MG: 30 TABLET, EXTENDED RELEASE ORAL at 08:35

## 2017-01-01 RX ADMIN — RANOLAZINE 500 MG: 500 TABLET, FILM COATED, EXTENDED RELEASE ORAL at 09:15

## 2017-01-01 RX ADMIN — SENNOSIDES AND DOCUSATE SODIUM 2 TABLET: 8.6; 5 TABLET ORAL at 20:39

## 2017-01-01 RX ADMIN — PHENYLEPHRINE HYDROCHLORIDE 100 MCG: 10 INJECTION INTRAVENOUS at 11:00

## 2017-01-01 RX ADMIN — CEFTRIAXONE 1 G: 1 INJECTION, POWDER, FOR SOLUTION INTRAMUSCULAR; INTRAVENOUS at 09:52

## 2017-01-01 RX ADMIN — FUROSEMIDE 40 MG: 10 INJECTION, SOLUTION INTRAMUSCULAR; INTRAVENOUS at 08:07

## 2017-01-01 RX ADMIN — DILTIAZEM HYDROCHLORIDE 60 MG: 60 TABLET, FILM COATED ORAL at 17:23

## 2017-01-01 RX ADMIN — ISOSORBIDE MONONITRATE 30 MG: 30 TABLET, EXTENDED RELEASE ORAL at 08:20

## 2017-01-01 RX ADMIN — FUROSEMIDE 40 MG: 10 INJECTION, SOLUTION INTRAMUSCULAR; INTRAVENOUS at 09:41

## 2017-01-01 RX ADMIN — DILTIAZEM HYDROCHLORIDE 120 MG: 120 CAPSULE, COATED, EXTENDED RELEASE ORAL at 10:02

## 2017-01-01 RX ADMIN — AMLODIPINE BESYLATE 10 MG: 10 TABLET ORAL at 20:15

## 2017-01-01 RX ADMIN — CEFTRIAXONE 1 G: 1 INJECTION, POWDER, FOR SOLUTION INTRAMUSCULAR; INTRAVENOUS at 10:15

## 2017-01-01 RX ADMIN — RANOLAZINE 500 MG: 500 TABLET, FILM COATED, EXTENDED RELEASE ORAL at 08:53

## 2017-01-10 NOTE — PROGRESS NOTES
PT states compliant c tx. PT denies any new medications or bleeding issues. PT denies any s/s of blood clot. PT instructed to continue same dose and Coumadin friendly diet. PT will be seen in 1 month. Patient instructed regarding medication; results given and questions answered. Nutritional counseling given.  Dietary factors affecting therapy addressed.  Patient instructed to monitor for excessive bruising or bleeding. PT verbalizes understanding. Electronically signed by JEFFREY Berg

## 2017-01-10 NOTE — MR AVS SNAPSHOT
[place] [facility] [place]   1/10/2017   Anticoagulation Visit    Dept Phone:  [phone]   Encounter #:  [id number]    Provider:  Betty Terry, RN   Department:  [facility] CARDIOLOGY                Your Full Care Plan              Your Updated Medication List      Notice  As of 1/10/2017  7:59 AM    You have not been prescribed any medications.            We Performed the Following     POC INR       You Were Diagnosed With        Codes Comments    Other pulmonary embolism without acute cor pulmonale, unspecified chronicity     ICD-10-CM: I26.99  ICD-9-CM: 415.19     Long-term (current) use of anticoagulants     ICD-10-CM: Z79.01  ICD-9-CM: V58.61       Instructions     None    Patient Instructions History      Anticoagulation Summary as of 1/10/2017     INR goal 2.0-3.0   Today's INR 2.40   Next INR check 2/14/2017    Indications   Pulmonary embolism [I26.99]  Long-term (current) use of anticoagulants [Z79.01]         January 2017 Details    Sun Mon Tue Wed Thu Fri Sat     1               2               3               4               5               6               7                 8               9               10      2.5 mg   See details      11      2.5 mg         12      2.5 mg         13      2.5 mg         14      2.5 mg           15      2.5 mg         16      2.5 mg         17      2.5 mg         18      2.5 mg         19      2.5 mg         20      2.5 mg         21      2.5 mg           22      2.5 mg         23      2.5 mg         24      2.5 mg         25      2.5 mg         26      2.5 mg         27      2.5 mg         28      2.5 mg           29      2.5 mg         30      2.5 mg         31      2.5 mg              Date Details   01/10 This INR check                 February 2017 Details    Sun Mon Tue Wed Thu Fri Sat        1      2.5 mg         2      2.5 mg         3      2.5 mg         4      2.5 mg           5      2.5 mg         6      2.5 mg         7      2.5 mg         8      2.5 mg         9      2.5 mg         10      2.5 mg         11      2.5 mg           12      2.5 mg         13      2.5 mg         14      2.5 mg         15               16               17               18                 19               20               21               22               23               24               25                 26               27               28                    Date Details   No additional details    Date of next INR:  2017           Upcoming Appointments     Visit Type Date Time Department    ANTICOAGULATION 1/10/2017  8:15 AM [facility] CARDIOLOGY [facility]    ANTICOAGULATION 2017  8:15 AM [facility]      MyChart Signup     [employer] allows you to send messages to your doctor, view your test results, renew your prescriptions, schedule appointments, and more. To sign up, go to [employer] and click on the Sign Up Now link in the New User? box. Enter your erento Activation Code exactly as it appears below along with the last four digits of your Social Security Number and your Date of Birth () to complete the sign-up process. If you do not sign up before the expiration date, you must request a new code.    erento Activation Code: [email or web address]  Expires: 2017  7:59 AM    If you have questions, you can email [email or web address] or call [phone] to talk to our erento staff. Remember, erento is NOT to be used for urgent needs. For medical emergencies, dial 911.               Other Info from Your Visit           Your Appointments     Tony 10, 2017  8:15 AM CST   Anticoagulation with COUMADIN CLINIC CARD [facility] CARDIOLOGY (--)    [address] [place] [address] Dr  [facility] [address] [place] [id number]   [phone]            2017  8:15 AM CST   Anticoagulation with COUMADIN CLINIC CARD [facility] CARDIOLOGY (--)    [address] [place] [address]  Dr  [facility] [address] [facility] [address] [phone]              Allergies     Not on File      Vital Signs     Blood Pressure Pulse                115/75 87          Results     POC INR      Component Value Standard Range & Units    INR 2.40 0.9 - 1.1

## 2017-02-14 NOTE — PROGRESS NOTES
Patient states no med changes or bleeding problems or unexplained bruising. Patient instructed to continue current dosing schedule. Verbalizes understanding. Will recheck 1 month.   Patient instructed regarding medication; results given and questions answered. Nutritional counseling given.  Dietary factors affecting therapy addressed.  Patient instructed to monitor for excessive bruising or bleeding.          This document has been electronically signed by JEFFREY Berg on February 15, 2017 9:13 AM

## 2017-03-05 PROBLEM — J18.9 PNEUMONIA OF BOTH LUNGS DUE TO INFECTIOUS ORGANISM: Status: ACTIVE | Noted: 2017-01-01

## 2017-03-05 NOTE — ED PROVIDER NOTES
Subjective   HPI Comments: Patient is an elderly male with hx of dyspnea, worsening over a course of weeks.  Family brought patient in secondary to large amount of peripheral edema.  Patient has been ballooning up like a tick' per the family.  Patient has had urine output, though has chronic difficulty with his prostate.  Patient has been weak, decreased physical activity.  Patient has known kidney disease for which he sees Dr. Holguin, known cardiac disease for which he sees Dr. Jenkins, also sees Dr. Damico for his prostate.  No recent f/c.  Chronic afib with RVR.      Patient is a 83 y.o. male presenting with shortness of breath.   History provided by:  Patient and friend   used: No    Shortness of Breath   Severity:  Moderate  Onset quality:  Gradual  Timing:  Constant  Progression:  Worsening  Chronicity:  Recurrent  Relieved by:  Nothing  Worsened by:  Exertion  Ineffective treatments:  None tried  Associated symptoms: cough and wheezing    Associated symptoms: no abdominal pain, no chest pain, no fever, no headaches, no neck pain, no syncope and no vomiting    Risk factors: no prolonged immobilization and no recent surgery        Review of Systems   Constitutional: Negative for appetite change, chills and fever.   HENT: Negative.  Negative for congestion.    Eyes: Negative.  Negative for photophobia and visual disturbance.   Respiratory: Positive for cough, shortness of breath and wheezing. Negative for chest tightness.    Cardiovascular: Positive for leg swelling. Negative for chest pain, palpitations and syncope.   Gastrointestinal: Negative.  Negative for abdominal pain, constipation, diarrhea, nausea and vomiting.   Endocrine: Negative.    Genitourinary: Negative.  Negative for decreased urine volume, dysuria, flank pain and hematuria.   Musculoskeletal: Negative.  Negative for arthralgias, back pain, myalgias, neck pain and neck stiffness.   Skin: Negative.  Negative for pallor.    Neurological: Positive for weakness. Negative for dizziness, syncope, light-headedness, numbness and headaches.   Psychiatric/Behavioral: Negative.  Negative for confusion and suicidal ideas. The patient is not nervous/anxious.    All other systems reviewed and are negative.      Past Medical History   Diagnosis Date   • BPH (benign prostatic hyperplasia)    • Coronary artery disease    • Kidney disease        No Known Allergies    Past Surgical History   Procedure Laterality Date   • Coronary artery bypass graft     • Back surgery         History reviewed. No pertinent family history.    Social History     Social History   • Marital status:      Spouse name: N/A   • Number of children: N/A   • Years of education: N/A     Social History Main Topics   • Smoking status: Former Smoker     Quit date: 2/5/1970   • Smokeless tobacco: None   • Alcohol use No   • Drug use: No   • Sexual activity: Defer     Other Topics Concern   • None     Social History Narrative   • None           Objective   Physical Exam   Constitutional: He is oriented to person, place, and time. He appears well-developed and well-nourished. No distress.   HENT:   Head: Normocephalic and atraumatic.   Eyes: Conjunctivae and EOM are normal.   Neck: Normal range of motion. Neck supple. No JVD present.   Cardiovascular: Normal rate, regular rhythm, normal heart sounds and intact distal pulses.  Exam reveals no gallop and no friction rub.    No murmur heard.  Pulmonary/Chest: He is in respiratory distress (tachypnea with end expiratory wheeze, decreased breath sounds bilaterally.). He has no wheezes. He has no rales. He exhibits no tenderness.   Abdominal: Soft. Bowel sounds are normal. He exhibits no distension and no mass. There is no tenderness. There is no rebound and no guarding.   Musculoskeletal: Normal range of motion. He exhibits edema (Patient with 2+ pitting edema bilateral lower extremities, with some 1+ edema without pitting in the  bilateral upper extremities.  ).   Neurological: He is alert and oriented to person, place, and time.   Skin: Skin is warm and dry.   Psychiatric: He has a normal mood and affect. His behavior is normal. Judgment and thought content normal.   Nursing note and vitals reviewed.      ECG 12 Lead    Date/Time: 3/5/2017 10:18 AM  Performed by: ROSANGELA RAMEY  Authorized by: ROSANGELA RAMEY   Interpreted by physician  Comparison: compared with previous ECG from 10/9/2015  Comparison to previous ECG: Atrial fibrillation not on prior EKG, though in hx.    Rhythm: atrial fibrillation  Clinical impression: abnormal ECG and non-specific ECG  Comments: Nonspecific ST T wave abnormality.                 ED Course  ED Course      Labs Reviewed   COMPREHENSIVE METABOLIC PANEL - Abnormal; Notable for the following:        Result Value    Glucose 105 (*)     BUN 57 (*)     Creatinine 6.15 (*)     Sodium 136 (*)     CO2 18.0 (*)     eGFR Non  Amer 9 (*)     All other components within normal limits    Narrative:     The MDRD GFR formula is only valid for adults with stable renal function between ages 18 and 70.   PROTIME-INR - Abnormal; Notable for the following:     Protime 52.6 (*)     INR 6.29 (*)     All other components within normal limits    Narrative:     Therapeutic range for most indications is 2.0-3.0 INR,  or 2.5-3.5 for mechanical heart valves.   BNP (IN-HOUSE) - Abnormal; Notable for the following:     proBNP 34986.0 (*)     All other components within normal limits   CBC WITH AUTO DIFFERENTIAL - Abnormal; Notable for the following:     RBC 3.64 (*)     Hemoglobin 10.4 (*)     Hematocrit 31.4 (*)     RDW 15.2 (*)     RDW-SD 48.1 (*)     All other components within normal limits   TROPONIN (IN-HOUSE) - Normal   LIPASE - Normal   CK MB - Normal   CK - Normal   RAINBOW DRAW    Narrative:     The following orders were created for panel order [place] Draw.  Procedure                               Abnormality          Status                     ---------                               -----------         ------                     Light Blue Top[[id number]]                                    In process                 Green Top (Gel)[[id number]]                                   In process                 Lavender Top[[id number]]                                      In process                 Gold Top - SST[[id number]]                                    In process                   Please view results for these tests on the individual orders.   CBC AND DIFFERENTIAL    Narrative:     The following orders were created for panel order CBC & Differential.  Procedure                               Abnormality         Status                     ---------                               -----------         ------                     CBC Auto Differential[[id number]]         Abnormal            Final result                 Please view results for these tests on the individual orders.   LIGHT BLUE TOP   GREEN TOP   LAVENDER TOP   GOLD TOP - SST       XR Chest 2 View   Final Result   CONCLUSION: Interval development of bilateral infiltrative   changes either atelectasis or pneumonitis and small bilateral   pleural effusions. Interval development of prominence of the   pulmonary vasculature. Unfavorable change since prior exam.      Electronically signed by:  Jono Dillon MD  3/5/2017 11:06 AM CST   Workstation: [id number]        Patient with possible pneumonia, though more likely effusion.  Treated with abx.  Patient symptoms more likely related to renal failure in conjunction with his renal failure and gradual worsening.  Patient with elevated INR.  Case discussed with Dr. Nichols, on call for Dr. Holguin.  Recommends Lasix.  Will consult.  Case discussed with Dr. Jorgensen for admission.                MDM    Final diagnoses:   Pneumonia of both lungs due to infectious organism, unspecified part of lung   Renal failure (ARF), acute on chronic   Acute  congestive heart failure, unspecified congestive heart failure type   Elevated INR            Micky Morgan MD  03/05/17 7236

## 2017-03-05 NOTE — H&P
[facility] Medicine Admission      Date of Admission: 3/5/2017      Primary Care Physician: Dago Lopez MD      Chief Complaint: increased sob    HPI: 84 y/o wm admitted with dyspnea, worsening over the last 3 weeks.  Symptoms continued to get worse, with increased shortness of breath and increased lower extremity edema.  He also reports abdominal edema as well as upper genitalia edema.  He has gained weight several pounds in the past few weeks.  Also reports dry cough, no chest pain no subjective fever no chills no abdominal pain no hematuria dysuria hematemesis hematochezia.  He reports nausea but no vomiting, reports shakiness and jerking movements, has symptoms of dysgeusia.  Has been sleeping more than usual, been lethargic somnolent fatigue.  Has decreased concentration, and occasional hallucinations.  He has refused hemodialysis in the past.       Past Medical History:   Past Medical History   Diagnosis Date   • BPH (benign prostatic hyperplasia)    • Coronary artery disease    • Kidney disease        Past Surgical History:   Past Surgical History   Procedure Laterality Date   • Coronary artery bypass graft     • Back surgery         Family History: History reviewed. No pertinent family history.    Social History:   Social History     Social History   • Marital status:      Spouse name: N/A   • Number of children: N/A   • Years of education: N/A     Social History Main Topics   • Smoking status: Former Smoker     Quit date: 2/5/1970   • Smokeless tobacco: None   • Alcohol use No   • Drug use: No   • Sexual activity: Defer     Other Topics Concern   • None     Social History Narrative   • None       Allergies: No Known Allergies    Medications:   Prior to Admission medications    Medication Sig Start Date End Date Taking? Authorizing Provider   acetaminophen-codeine (TYLENOL #3) 300-30 MG per tablet Take 1 tablet by mouth Every 4 (Four) Hours As  Needed for moderate pain (4-6).   Yes Historical Provider, MD   amLODIPine (NORVASC) 10 MG tablet Take 10 mg by mouth Daily.   Yes Historical Provider, MD   carvedilol (COREG) 3.125 MG tablet Take 3.125 mg by mouth 2 (Two) Times a Day With Meals.   Yes Historical Provider, MD   isosorbide mononitrate (IMDUR) 30 MG 24 hr tablet Take 30 mg by mouth Daily.   Yes Historical Provider, MD   ranolazine (RANEXA) 500 MG 12 hr tablet Take 500 mg by mouth 2 (Two) Times a Day.   Yes Historical Provider, MD   warfarin (COUMADIN) 2.5 MG tablet Take 2.5 mg by mouth Daily.   Yes Historical Provider, MD       Review of Systems:  Review of Systems   Otherwise complete ROS is negative except as mentioned above.  Shortness of breath no abdominal pain  Physical Exam:   Temp:  [97.4 °F (36.3 °C)] 97.4 °F (36.3 °C)  Heart Rate:  [104-124] 108  Resp:  [21-24] 21  BP: (130-145)/(78-92) 130/92  Physical Exam  Patient appears well, alert and oriented x 3, pleasant, cooperative.   Neck supple  No JVD No thyromegaly.  Lungs +crackles no wheezing   CV S1S2 normal, no murmurs, clicks, gallops or rubs.  Abdomen is soft, no tenderness, slightly distended   Extremities: no clubbing cyanosis 1+ edema upper and lower extremities.  Neurological CN 2-12 intact       Results Reviewed:  I have personally reviewed current lab, radiology, and data and agree with results.  Lab Results (last 24 hours)     Procedure Component Value Units Date/Time    [place] Draw [[id number]] Collected:  03/05/17 1051    Specimen:  Blood Updated:  03/05/17 1058    Narrative:       The following orders were created for panel order [place] Draw.  Procedure                               Abnormality         Status                     ---------                               -----------         ------                     Light Blue Top[[id number]]                                    In process                 Green Top (Gel)[[id number]]                                   In process                  Lavender Top[[id number]]                                      In process                 Gold Top - SST[[id number]]                                    In process                   Please view results for these tests on the individual orders.    Green Top (Gel) [[id number]] Collected:  03/05/17 1051    Specimen:  Blood Updated:  03/05/17 1058    Gold Top - SST [[id number]] Collected:  03/05/17 1051    Specimen:  Blood Updated:  03/05/17 1058    Light Blue Top [[id number]] Collected:  03/05/17 1051    Specimen:  Blood Updated:  03/05/17 1058    Lavender Top [[id number]] Collected:  03/05/17 1051    Specimen:  Blood Updated:  03/05/17 1058    CBC & Differential [[id number]] Collected:  03/05/17 1051    Specimen:  Blood Updated:  03/05/17 1100    Narrative:       The following orders were created for panel order CBC & Differential.  Procedure                               Abnormality         Status                     ---------                               -----------         ------                     CBC Auto Differential[[id number]]         Abnormal            Final result                 Please view results for these tests on the individual orders.    CBC Auto Differential [[id number]]  (Abnormal) Collected:  03/05/17 1051    Specimen:  Blood Updated:  03/05/17 1100     WBC 6.14 10*3/mm3      RBC 3.64 (L) 10*6/mm3      Hemoglobin 10.4 (L) g/dL      Hematocrit 31.4 (L) %      MCV 86.3 fL      MCH 28.6 pg      MCHC 33.1 g/dL      RDW 15.2 (H) %      RDW-SD 48.1 (H) fl      MPV 11.5 fL      Platelets 212 10*3/mm3      Neutrophil % 70.9 %      Lymphocyte % 14.8 %      Monocyte % 11.6 %      Eosinophil % 2.0 %      Basophil % 0.5 %      Immature Grans % 0.2 %      Neutrophils, Absolute 4.36 10*3/mm3      Lymphocytes, Absolute 0.91 10*3/mm3      Monocytes, Absolute 0.71 10*3/mm3      Eosinophils, Absolute 0.12 10*3/mm3      Basophils, Absolute 0.03 10*3/mm3      Immature Grans, Absolute 0.01 10*3/mm3     Lipase [[id number]]  (Normal)  Collected:  03/05/17 1051    Specimen:  Blood Updated:  03/05/17 1112     Lipase 79 U/L     CK [[id number]]  (Normal) Collected:  03/05/17 1051    Specimen:  Blood Updated:  03/05/17 1112     Creatine Kinase 167 U/L     Comprehensive Metabolic Panel [[id number]]  (Abnormal) Collected:  03/05/17 1051    Specimen:  Blood Updated:  03/05/17 1114     Glucose 105 (H) mg/dL      BUN 57 (H) mg/dL      Creatinine 6.15 (H) mg/dL      Sodium 136 (L) mmol/L      Potassium 4.7 mmol/L      Chloride 104 mmol/L      CO2 18.0 (L) mmol/L      Calcium 9.0 mg/dL      Total Protein 7.0 g/dL      Albumin 4.30 g/dL      ALT (SGPT) 40 U/L      AST (SGOT) 35 U/L      Alkaline Phosphatase 74 U/L      Total Bilirubin 1.1 mg/dL      eGFR Non African Amer 9 (L) mL/min/1.73      Globulin 2.7 gm/dL      A/G Ratio 1.6 g/dL      BUN/Creatinine Ratio 9.3      Anion Gap 14.0 mmol/L     Narrative:       The MDRD GFR formula is only valid for adults with stable renal function between ages 18 and 70.    Troponin [[id number]]  (Normal) Collected:  03/05/17 1051    Specimen:  Blood Updated:  03/05/17 1124     Troponin I <0.012 ng/mL     BNP [[id number]]  (Abnormal) Collected:  03/05/17 1051    Specimen:  Blood Updated:  03/05/17 1124     proBNP 83131.0 (H) pg/mL     CK-MB [[id number]]  (Normal) Collected:  03/05/17 1051    Specimen:  Blood Updated:  03/05/17 1124     CKMB 2.56 ng/mL     Protime-INR [[id number]]  (Abnormal) Collected:  03/05/17 1051    Specimen:  Blood Updated:  03/05/17 1134     Protime 52.6 (H) Seconds      INR 6.29 (C)       INR 6.18 on repeat testing       Narrative:       Therapeutic range for most indications is 2.0-3.0 INR,  or 2.5-3.5 for mechanical heart valves.        Imaging Results (last 24 hours)     Procedure Component Value Units Date/Time    XR Chest 2 View [[id number]] Collected:  03/05/17 1104     Updated:  03/05/17 1107    Narrative:       Patient Name:  MR. SARITA ROMANO STONE  Patient ID:  [id number]   Ordering:  ROSANGELA BROWN  KAROLINE  Attending:  ROSANGELA RAMEY  Referring:  ROSANGELA RAMEY  ------------------------------------------------  Chest PA and lateral  CLINICAL INDICATION: Shortness of breath.    COMPARISON: Chest October 9, 2015.    FINDINGS: Midline sternal sutures from prior cardiac surgery.  Interval development of bilateral basilar infiltrative changes  and small pleural effusions. Interval development of prominence  of the pulmonary vasculature. Unfavorable change since prior  exam.      Impression:       CONCLUSION: Interval development of bilateral infiltrative  changes either atelectasis or pneumonitis and small bilateral  pleural effusions. Interval development of prominence of the  pulmonary vasculature. Unfavorable change since prior exam.    Electronically signed by:  Jono Dillon MD  3/5/2017 11:06 AM CST  Workstation: [id number]            Assessment/Plan:             1.  Acute kidney injury on chronic kidney disease stage V serum creatinine 6.1 from baseline of 4.2, with uremic symptoms associated with hypervolemia and decreased arterial circulating effective volume, place patient on Lasix 40 mg IV twice a day, continue to monitor urine output and serum creatinine.  Patient refuses hemodialysis, I discussed with him the option of peritoneal dialysis, he is interested in it and will discuss further with Dr. Schreiber.  He will likely need renal replacement therapy soon.  2.  Bilateral lower lobe pneumonia unspecified organism: Start IV antibiotics with Rocephin and Zithromax, obtain sputum and blood cultures continue oxygen and bronchodilators as indicated.  3.  Hypertension: Hold antihypertensives until patient is more hemodynamically stable.  4.  Coronary artery disease: No evidence of acute coronary syndrome obtain 3 sets of cardiac enzymes and a d-dimer and monitor.  5.  BPH: Continue outpatient medications.  6.  Chronic atrial fibrillation: Continue Coreg and Coumadin check daily INR, pharmacy to dose.  DVT  prophylaxis Coumadin      Rodrigo Montero MD  03/05/17  2:09 PM

## 2017-03-05 NOTE — ED NOTES
Pt c/o soa, states he was told he has fluid on his lungs by Dr Lopez last week     Alexx Fox, RN  03/05/17 0956

## 2017-03-05 NOTE — CONSULTS
Nephrology Consultation:    Presenting complaints:   Acute renal failure    83-year-old patient with a history of stage IV chronic kidney disease, hypertension, atrial fibrillation, chronic anticoagulation, renal stone disease, history of urinary tract infection and obstruction in the past, now admitted with worsening shortness of breath, generalized edema and weight gain.  Patient has a history of stage IV chronic disease, with a previous serum creatinine of around 4.2.  He has had episodes of acute kidney injury related to nephrolithiasis, obstruction, urinary tract infection.  We have discussed possible need for dialysis on several occasions in the past, patient did not want to consider that possibility, did not want to have any dialysis access placed, and had refused dialysis in the past.       Admitted with worsening shortness of breath over the last 3 weeks, worse over the last 5 days.  Complains of orthopnea.  Cough, with no significant expectoration.  No fever.  Vision states that his urine output has been stable.  He denies any dysuria or hematuria.  Denies flank pain.  No diarrhea or bleeding manifestations.  He has seen Dr. Lopez regarding the same, was placed on fluid restriction.  Chest x-ray done today shows bilateral lung infiltrates, possibly fluid overload, and lab and discussion show acute kidney injury with a creatinine close to 6.    Family at bedside.  Patient is sitting up in bed.  No acute distress.  He denies any chest pain or fever.  No abdominal pain.  Has edema of the lower extremities and upper arms.    Past medical illness:  Patient Active Problem List   Diagnosis   • Pulmonary embolism   • Long-term (current) use of anticoagulants   • Pneumonia of both lungs due to infectious organism     Past Surgical History   Procedure Laterality Date   • Coronary artery bypass graft     • Back surgery         Social history:    Social History     Social History   • Marital status:       Spouse name: N/A   • Number of children: N/A   • Years of education: N/A     Occupational History   • Not on file.     Social History Main Topics   • Smoking status: Former Smoker     Quit date: 2/5/1970   • Smokeless tobacco: Not on file   • Alcohol use No   • Drug use: No   • Sexual activity: Defer     Other Topics Concern   • Not on file     Social History Narrative   • No narrative on file       Family history:  History reviewed. No pertinent family history.    Review of systems:  Positives are mentioned under history of present illness.  Other systems were reviewed and negative.  No bleeding manifestations.  Good urine output.  No dysuria hematuria or frequency.  Denies abdominal pain or discomfort.  No recent changes in vision.  He has been followed by urology.      Medication list:    No current facility-administered medications on file prior to encounter.      No current outpatient prescriptions on file prior to encounter.     Scheduled Meds:  amLODIPine 10 mg Oral Nightly   [START ON 3/6/2017] azithromycin 500 mg Intravenous Q24H   carvedilol 6.25 mg Oral BID With Meals   [START ON 3/6/2017] ceftriaxone 1 g Intravenous Q24H   colchicine 0.6 mg Oral Daily   furosemide 40 mg Intravenous BID   isosorbide mononitrate 30 mg Oral Daily   ranolazine 500 mg Oral BID   spironolactone 25 mg Oral Daily     Continuous Infusions:  Pharmacy to dose warfarin      PRN Meds:•  acetaminophen-codeine  •  Pharmacy to dose warfarin  •  sodium chloride    Allergies:  Review of patient's allergies indicates no known allergies.    On examination:  Vitals:    03/05/17 1132 03/05/17 1232 03/05/17 1302 03/05/17 1432   BP: 139/92 145/82 130/92 108/76   BP Location:       Patient Position:       Pulse: 108 104 108 104   Resp:  21     Temp:       TempSrc:       SpO2: 94% 97% (!) 89% 91%   Weight:       Height:         General:  Sitting up in bed.  Comfortable.  Family is at bedside he is alert and oriented  HEENT: Mild pallor, no  icterus, eye movements are normal.  Chest: Diminished breath sounds at both lung bases.  I'm unable to appreciate any rales or wheezes  CVS: Heart sounds are irregular.  No pericardial rub or gallop  Abdomen: Distended, nontender, normal bowel sounds, no organomegaly  Extremities: 2+ edema of the lower ex midis.  He has edema of the upper arms as well.  No calf tenderness.  Neuro: Alert and oriented.  No asterixis.  No focal motor neurological deficits.  Mentation: He is alert and oriented.    Past medical illness, social history, medications, previous notes reviewed.       Laboratory tests:  Imaging Results (last 24 hours)     Procedure Component Value Units Date/Time    XR Chest 2 View [[id number]] Collected:  03/05/17 1104     Updated:  03/05/17 1107    Narrative:       Patient Name:  MR. SARITA SMITH  Patient ID:  [id number]   Ordering:  ROSANGELA RAMEY  Attending:  ROSANGELA RAMEY  Referring:  ROSANGELA RAMEY  ------------------------------------------------  Chest PA and lateral  CLINICAL INDICATION: Shortness of breath.    COMPARISON: Chest October 9, 2015.    FINDINGS: Midline sternal sutures from prior cardiac surgery.  Interval development of bilateral basilar infiltrative changes  and small pleural effusions. Interval development of prominence  of the pulmonary vasculature. Unfavorable change since prior  exam.      Impression:       CONCLUSION: Interval development of bilateral infiltrative  changes either atelectasis or pneumonitis and small bilateral  pleural effusions. Interval development of prominence of the  pulmonary vasculature. Unfavorable change since prior exam.    Electronically signed by:  Jono Dillon MD  3/5/2017 11:06 AM [facility]  Workstation: [id number]          Recent Results (from the past 24 hour(s))   Comprehensive Metabolic Panel    Collection Time: 03/05/17 10:51 AM   Result Value Ref Range    Glucose 105 (H) 60 - 100 mg/dL    BUN 57 (H) 7 - 21 mg/dL    Creatinine 6.15 (H) 0.70 -  1.30 mg/dL    Sodium 136 (L) 137 - 145 mmol/L    Potassium 4.7 3.5 - 5.1 mmol/L    Chloride 104 95 - 110 mmol/L    CO2 18.0 (L) 22.0 - 31.0 mmol/L    Calcium 9.0 8.4 - 10.2 mg/dL    Total Protein 7.0 6.3 - 8.6 g/dL    Albumin 4.30 3.40 - 4.80 g/dL    ALT (SGPT) 40 21 - 72 U/L    AST (SGOT) 35 17 - 59 U/L    Alkaline Phosphatase 74 38 - 126 U/L    Total Bilirubin 1.1 0.2 - 1.3 mg/dL    eGFR Non African Amer 9 (L) >60 mL/min/1.73    Globulin 2.7 2.3 - 3.5 gm/dL    A/G Ratio 1.6 1.1 - 1.8 g/dL    BUN/Creatinine Ratio 9.3 7.0 - 25.0    Anion Gap 14.0 5.0 - 15.0 mmol/L   Troponin    Collection Time: 03/05/17 10:51 AM   Result Value Ref Range    Troponin I <0.012 <=0.034 ng/mL   Protime-INR    Collection Time: 03/05/17 10:51 AM   Result Value Ref Range    Protime 52.6 (H) 11.1 - 15.3 Seconds    INR 6.29 (C) 0.80 - 1.20   Lipase    Collection Time: 03/05/17 10:51 AM   Result Value Ref Range    Lipase 79 23 - 300 U/L   BNP    Collection Time: 03/05/17 10:51 AM   Result Value Ref Range    proBNP 66949.0 (H) 0.0 - 1800.0 pg/mL   CK-MB    Collection Time: 03/05/17 10:51 AM   Result Value Ref Range    CKMB 2.56 0.00 - 5.00 ng/mL   CK    Collection Time: 03/05/17 10:51 AM   Result Value Ref Range    Creatine Kinase 167 55 - 170 U/L   Light Blue Top    Collection Time: 03/05/17 10:51 AM   Result Value Ref Range    Extra Tube hold for add-on    Green Top (Gel)    Collection Time: 03/05/17 10:51 AM   Result Value Ref Range    Extra Tube Hold for add-ons.    Lavender Top    Collection Time: 03/05/17 10:51 AM   Result Value Ref Range    Extra Tube hold for add-on    Gold Top - SST    Collection Time: 03/05/17 10:51 AM   Result Value Ref Range    Extra Tube Hold for add-ons.    CBC Auto Differential    Collection Time: 03/05/17 10:51 AM   Result Value Ref Range    WBC 6.14 3.20 - 9.80 10*3/mm3    RBC 3.64 (L) 4.37 - 5.74 10*6/mm3    Hemoglobin 10.4 (L) 13.7 - 17.3 g/dL    Hematocrit 31.4 (L) 39.0 - 49.0 %    MCV 86.3 80.0 - 98.0 fL     MCH 28.6 26.5 - 34.0 pg    MCHC 33.1 31.5 - 36.3 g/dL    RDW 15.2 (H) 11.5 - 14.5 %    RDW-SD 48.1 (H) 35.1 - 43.9 fl    MPV 11.5 8.0 - 12.0 fL    Platelets 212 150 - 450 10*3/mm3    Neutrophil % 70.9 37.0 - 80.0 %    Lymphocyte % 14.8 10.0 - 50.0 %    Monocyte % 11.6 0.0 - 12.0 %    Eosinophil % 2.0 0.0 - 7.0 %    Basophil % 0.5 0.0 - 2.0 %    Immature Grans % 0.2 0.0 - 0.5 %    Neutrophils, Absolute 4.36 2.00 - 8.60 10*3/mm3    Lymphocytes, Absolute 0.91 0.60 - 4.20 10*3/mm3    Monocytes, Absolute 0.71 0.00 - 0.90 10*3/mm3    Eosinophils, Absolute 0.12 0.00 - 0.70 10*3/mm3    Basophils, Absolute 0.03 0.00 - 0.20 10*3/mm3    Immature Grans, Absolute 0.01 0.00 - 0.02 10*3/mm3   ]    Impression:     Acute kidney injury  Stage IV chronic kidney disease  Fluid overload  Possible pneumonia  History of nephrolithiasis, chronic kidney disease  History of urinary tract infection in the past  Atrial fibrillation, on chronic anticoagulation  Essential hypertension    Plan:      Acute kidney injury on stage IV chronic kidney disease: Patient is admitted with worsening shortness of breath, bilateral lung infiltrates, fluid overload, increased weight gain and edema.  His serum creatinine is around 4 at baseline, with a history of nephrolithiasis, urinary tract infections in the past.  He has had progressive worsening of shortness of breath, and weight gain over the last 3 weeks, worse over the last 5 days.  Chest x-ray results were reviewed.    Serum potassium and bicarbonate levels are stable.  There is no pericardial rub or asterixis on examination.  Patient has been admitted for further evaluation and treatment.  Agree with IV diuretics.  Sodium and fluid restriction to be monitored.  Patient is not on any nephrotoxic medications at this time.  He is not on ACE inhibitor's or ARB.    Currently on IV antibiotics.  Medications when indicated, to be dosed according to renal functions.  Avoid nephrotoxic medications.    Patient  had previously not wanted to consider dialysis treatments.  He had refused placement of an dialysis access in the past.  I have discussed with patient and family today, regarding his renal functions and possible need for dialysis.  I have discussed with patient that if there is significant worsening of his fluid status, or significant acid base of her electronic abnormalities, he will need hemodialysis.  Today, he tells me that he may be agreeable for peritoneal dialysis down the road, and we will evaluate him for that as well.  If he needs acute dialysis during the hospital stay, we will go with hemodialysis.  I have discussed the different modalities of dialysis, including home therapies, placement of a temporary hemodialysis catheter, hemodialysis and peritoneal dialysis treatments with patient and his family in detail.  At this time patient's is not agreeable for dialysis, and inform me that he will let me know his decision depending on how he feels.  His family understands the patient's decision.    Hold Colchicine and spironolactone due to acute kidney injury.  We will maintain on fluid restriction.    Check urinalysis, urine culture, urine sodium.  Obtain total CPK, repeat renal functions and electronics in the morning.  Avoid nephrotoxic medications.  Renal and bladder ultrasound in the morning.  Follow intake and output.    Discussed with patient and family detail.  We will continue to closely follow with you    Ezio Sweeney MD

## 2017-03-06 NOTE — PROGRESS NOTES
Nephrology Progress Note:    LL edema slightly better.  Still with SOA with activity.   Appetite poor.   No CP or vomiting.   Stable output.   No dysuria or hematuria.  Going for renal ultrasound.  INR high.   On IV Lasix.      Patient Active Problem List   Diagnosis   • Pulmonary embolism   • Long-term (current) use of anticoagulants   • Pneumonia of both lungs due to infectious organism       Medications:    amLODIPine 10 mg Oral Nightly   azithromycin 500 mg Intravenous Q24H   carvedilol 6.25 mg Oral BID With Meals   ceftriaxone 1 g Intravenous Q24H   furosemide 40 mg Intravenous BID   isosorbide mononitrate 30 mg Oral Daily   ranolazine 500 mg Oral BID       Pharmacy to dose warfarin      Vitals:    03/06/17 0104 03/06/17 0423 03/06/17 0711 03/06/17 0734   BP:  142/74 170/80    BP Location:  Left arm Left arm    Patient Position:  Lying Lying    Pulse: 75 73 82 81   Resp:  20 18    Temp:  97.7 °F (36.5 °C) 98.2 °F (36.8 °C)    TempSrc:  Oral Oral    SpO2:  93% 95%    Weight:  188 lb 12.8 oz (85.6 kg)     Height:         I/O last 3 completed shifts:  In: 100 [IV Piggyback:100]  Out: 1200 [Urine:1200]       On examination:  General:   Thin built, comfortable.  No distress.  Alert and oriented.   Family at bedside  HEENT:  Pallor present, no icterus.    Chest:  Diminished breath sounds, no rales or wheeze  CVS:   Irregular heart sounds, no pericardial rub or gallop   Abdomen: soft, distended, non tender, normal bowel sounds.    Extremities:  1+ dewayne edema.   Arm edema.   No calf pain   Neuro:  Grade 4 power both upper and lower limbs.   Mentation:  Alert and oriented.      Past medical illness, social history, medications, previous notes reviewed.       Laboratory results:      Recent Results (from the past 24 hour(s))   Comprehensive Metabolic Panel    Collection Time: 03/05/17 10:51 AM   Result Value Ref Range    Glucose 105 (H) 60 - 100 mg/dL    BUN 57 (H) 7 - 21 mg/dL    Creatinine 6.15 (H) 0.70 - 1.30 mg/dL     Sodium 136 (L) 137 - 145 mmol/L    Potassium 4.7 3.5 - 5.1 mmol/L    Chloride 104 95 - 110 mmol/L    CO2 18.0 (L) 22.0 - 31.0 mmol/L    Calcium 9.0 8.4 - 10.2 mg/dL    Total Protein 7.0 6.3 - 8.6 g/dL    Albumin 4.30 3.40 - 4.80 g/dL    ALT (SGPT) 40 21 - 72 U/L    AST (SGOT) 35 17 - 59 U/L    Alkaline Phosphatase 74 38 - 126 U/L    Total Bilirubin 1.1 0.2 - 1.3 mg/dL    eGFR Non African Amer 9 (L) >60 mL/min/1.73    Globulin 2.7 2.3 - 3.5 gm/dL    A/G Ratio 1.6 1.1 - 1.8 g/dL    BUN/Creatinine Ratio 9.3 7.0 - 25.0    Anion Gap 14.0 5.0 - 15.0 mmol/L   Troponin    Collection Time: 03/05/17 10:51 AM   Result Value Ref Range    Troponin I <0.012 <=0.034 ng/mL   Protime-INR    Collection Time: 03/05/17 10:51 AM   Result Value Ref Range    Protime 52.6 (H) 11.1 - 15.3 Seconds    INR 6.29 (C) 0.80 - 1.20   Lipase    Collection Time: 03/05/17 10:51 AM   Result Value Ref Range    Lipase 79 23 - 300 U/L   BNP    Collection Time: 03/05/17 10:51 AM   Result Value Ref Range    proBNP 91740.0 (H) 0.0 - 1800.0 pg/mL   CK-MB    Collection Time: 03/05/17 10:51 AM   Result Value Ref Range    CKMB 2.56 0.00 - 5.00 ng/mL   CK    Collection Time: 03/05/17 10:51 AM   Result Value Ref Range    Creatine Kinase 167 55 - 170 U/L   Light Blue Top    Collection Time: 03/05/17 10:51 AM   Result Value Ref Range    Extra Tube hold for add-on    Green Top (Gel)    Collection Time: 03/05/17 10:51 AM   Result Value Ref Range    Extra Tube Hold for add-ons.    Lavender Top    Collection Time: 03/05/17 10:51 AM   Result Value Ref Range    Extra Tube hold for add-on    Gold Top - SST    Collection Time: 03/05/17 10:51 AM   Result Value Ref Range    Extra Tube Hold for add-ons.    CBC Auto Differential    Collection Time: 03/05/17 10:51 AM   Result Value Ref Range    WBC 6.14 3.20 - 9.80 10*3/mm3    RBC 3.64 (L) 4.37 - 5.74 10*6/mm3    Hemoglobin 10.4 (L) 13.7 - 17.3 g/dL    Hematocrit 31.4 (L) 39.0 - 49.0 %    MCV 86.3 80.0 - 98.0 fL    MCH 28.6 26.5  - 34.0 pg    MCHC 33.1 31.5 - 36.3 g/dL    RDW 15.2 (H) 11.5 - 14.5 %    RDW-SD 48.1 (H) 35.1 - 43.9 fl    MPV 11.5 8.0 - 12.0 fL    Platelets 212 150 - 450 10*3/mm3    Neutrophil % 70.9 37.0 - 80.0 %    Lymphocyte % 14.8 10.0 - 50.0 %    Monocyte % 11.6 0.0 - 12.0 %    Eosinophil % 2.0 0.0 - 7.0 %    Basophil % 0.5 0.0 - 2.0 %    Immature Grans % 0.2 0.0 - 0.5 %    Neutrophils, Absolute 4.36 2.00 - 8.60 10*3/mm3    Lymphocytes, Absolute 0.91 0.60 - 4.20 10*3/mm3    Monocytes, Absolute 0.71 0.00 - 0.90 10*3/mm3    Eosinophils, Absolute 0.12 0.00 - 0.70 10*3/mm3    Basophils, Absolute 0.03 0.00 - 0.20 10*3/mm3    Immature Grans, Absolute 0.01 0.00 - 0.02 10*3/mm3   Protime-INR    Collection Time: 03/05/17  4:30 PM   Result Value Ref Range    Protime 53.7 (H) 11.1 - 15.3 Seconds    INR 6.46 (C) 0.80 - 1.20   Blood Culture    Collection Time: 03/05/17  4:30 PM   Result Value Ref Range    Blood Culture No growth at less than 24 hours    Blood Culture    Collection Time: 03/05/17  4:31 PM   Result Value Ref Range    Blood Culture No growth at less than 24 hours    Urinalysis With / Culture If Indicated    Collection Time: 03/05/17  6:43 PM   Result Value Ref Range    Color, UA Dark Yellow Yellow, Straw, Dark Yellow, Patricia    Appearance, UA Cloudy (A) Clear    pH, UA 5.5 5.0 - 9.0    Specific Gravity, UA 1.016 1.003 - 1.030    Glucose, UA Negative Negative    Ketones, UA Negative Negative    Bilirubin, UA Negative Negative    Blood, UA Moderate (2+) (A) Negative    Protein, UA 30 mg/dL (1+) (A) Negative    Leuk Esterase, UA Large (3+) (A) Negative    Nitrite, UA Positive (A) Negative    Urobilinogen, UA 0.2 E.U./dL 0.2 - 1.0 E.U./dL   Urinalysis, Microscopic Only    Collection Time: 03/05/17  6:43 PM   Result Value Ref Range    RBC, UA 6-12 (A) None Seen /HPF    WBC, UA Too Numerous to Count (A) None Seen, 0-2, 3-5 /HPF    Bacteria, UA 2+ (A) None Seen /HPF    Squamous Epithelial Cells, UA 3-5 (A) None Seen, 0-2 /HPF     Hyaline Casts, UA None Seen None Seen /LPF    Methodology Automated Microscopy    Urine Culture    Collection Time: 03/05/17  6:43 PM   Result Value Ref Range    Urine Culture Culture in progress    Comprehensive Metabolic Panel    Collection Time: 03/06/17  6:23 AM   Result Value Ref Range    Glucose 86 60 - 100 mg/dL    BUN 58 (H) 7 - 21 mg/dL    Creatinine 6.56 (H) 0.70 - 1.30 mg/dL    Sodium 138 137 - 145 mmol/L    Potassium 4.0 3.5 - 5.1 mmol/L    Chloride 104 95 - 110 mmol/L    CO2 18.0 (L) 22.0 - 31.0 mmol/L    Calcium 8.9 8.4 - 10.2 mg/dL    Total Protein 7.1 6.3 - 8.6 g/dL    Albumin 4.40 3.40 - 4.80 g/dL    ALT (SGPT) 43 21 - 72 U/L    AST (SGOT) 28 17 - 59 U/L    Alkaline Phosphatase 81 38 - 126 U/L    Total Bilirubin 0.9 0.2 - 1.3 mg/dL    eGFR Non  Amer 8 (L) 42 - 98 mL/min/1.73    Globulin 2.7 2.3 - 3.5 gm/dL    A/G Ratio 1.6 1.1 - 1.8 g/dL    BUN/Creatinine Ratio 8.8 7.0 - 25.0    Anion Gap 16.0 (H) 5.0 - 15.0 mmol/L   Protime-INR    Collection Time: 03/06/17  6:23 AM   Result Value Ref Range    Protime 49.3 (H) 11.1 - 15.3 Seconds    INR 5.77 (C) 0.80 - 1.20   CK    Collection Time: 03/06/17  6:23 AM   Result Value Ref Range    Creatine Kinase 204 (H) 55 - 170 U/L   CBC Auto Differential    Collection Time: 03/06/17  6:23 AM   Result Value Ref Range    WBC 5.66 3.20 - 9.80 10*3/mm3    RBC 3.78 (L) 4.37 - 5.74 10*6/mm3    Hemoglobin 10.7 (L) 13.7 - 17.3 g/dL    Hematocrit 32.5 (L) 39.0 - 49.0 %    MCV 86.0 80.0 - 98.0 fL    MCH 28.3 26.5 - 34.0 pg    MCHC 32.9 31.5 - 36.3 g/dL    RDW 15.5 (H) 11.5 - 14.5 %    RDW-SD 47.9 (H) 35.1 - 43.9 fl    MPV 12.2 (H) 8.0 - 12.0 fL    Platelets 212 150 - 450 10*3/mm3    Neutrophil % 57.2 37.0 - 80.0 %    Lymphocyte % 23.5 10.0 - 50.0 %    Monocyte % 13.8 (H) 0.0 - 12.0 %    Eosinophil % 4.1 0.0 - 7.0 %    Basophil % 0.7 0.0 - 2.0 %    Immature Grans % 0.7 (H) 0.0 - 0.5 %    Neutrophils, Absolute 3.24 2.00 - 8.60 10*3/mm3    Lymphocytes, Absolute 1.33 0.60 -  4.20 10*3/mm3    Monocytes, Absolute 0.78 0.00 - 0.90 10*3/mm3    Eosinophils, Absolute 0.23 0.00 - 0.70 10*3/mm3    Basophils, Absolute 0.04 0.00 - 0.20 10*3/mm3    Immature Grans, Absolute 0.04 (H) 0.00 - 0.02 10*3/mm3   ]    Imaging Results (last 24 hours)     Procedure Component Value Units Date/Time    XR Chest 2 View [[id number]] Collected:  03/05/17 1104     Updated:  03/05/17 1107    Narrative:       Patient Name:  MR. SARITA SMITH  Patient ID:  [id number]   Ordering:  ROSANGELA RAMEY  Attending:  ROSANGELA RAMEY  Referring:  ROSANGELA RAMEY  ------------------------------------------------  Chest PA and lateral  CLINICAL INDICATION: Shortness of breath.    COMPARISON: Chest October 9, 2015.    FINDINGS: Midline sternal sutures from prior cardiac surgery.  Interval development of bilateral basilar infiltrative changes  and small pleural effusions. Interval development of prominence  of the pulmonary vasculature. Unfavorable change since prior  exam.      Impression:       CONCLUSION: Interval development of bilateral infiltrative  changes either atelectasis or pneumonitis and small bilateral  pleural effusions. Interval development of prominence of the  pulmonary vasculature. Unfavorable change since prior exam.    Electronically signed by:  Jono Dillon MD  3/5/2017 11:06 AM CST  Workstation: [id number]        Assessment:     Acute renal failure  Chronic kidney disease, stage 5  Shortness of breath   Fluid overload, edema  Possible lung infiltrates  Metabolic acidosis  CAD, CABG, Atrial fibrillation  Coagulopathy, INR high  Renal stones, obstr uropathy in past.     Plan:     COSMO on CKD-4/5.  Creat 6.5.    K is stable.   On IV diuretics.   Get renal USG.   He has history of CKD-4 and now renal functions worse.  Does not want to consider dialysis.  I discussed again with patient and daughter.  Has loss of appetite, fluid overload, GFR less than 10.   I discussed life threatening complications of  hyperkalemia, fluid overload, respiratory failure with patient and family and all questions answered.  Patient does not want to consider dialysis and wants to wait and see how the labs do.   I discussed permcath placement for dialysis.   If patient prefers to do peritoneal dialysis, that can be trained/arranged down the road as well.   At this time he does not want to consider dialysis.      On IV Lasix.  K stable.   Spironolactone stopped.  No ACEI or ARB.  No NSAIDS    No pericardial rub or gallop.  No asterixis.   He is alert and oriented.   Family at bedside    On IV antibiotics.   Avoid nephrotoxins.      INR high.  Coumadin on hold.      Will continue to follow with you.       Ezio Sweeney MD      ADDENDUM:    I went back and talked to patient's daughter after patient went for ultrasound.  I explained labs, need for dialysis.   Family understands, they know of other [employer] members, family on dialysis.  Patient does not want to do dialysis and they wish to respect his decision.   I have explained the complications of renal failure and uremia in detail.    Will follow ultrasound and renal functions.  All questions answered .     Ezio Sweeney MD

## 2017-03-06 NOTE — PROGRESS NOTES
"Anticoagulation by Pharmacy - Warfarin    [place] Heber Middleton is a 83 y.o.male  [Ht: 74\" (188 cm); Wt: 188 lb 12.8 oz (85.6 kg)] on Warfarin 2.5 mg for indication of PE. Current dose on HOLD.    Goal INR: 2-3  Today's INR:   Lab Results   Component Value Date    INR 5.77 (C) 03/06/2017         Lab Results   Component Value Date    INR 5.77 (C) 03/06/2017    INR 6.46 (C) 03/05/2017    INR 6.29 (C) 03/05/2017    PROTIME 49.3 (H) 03/06/2017    PROTIME 53.7 (H) 03/05/2017    PROTIME 52.6 (H) 03/05/2017     Lab Results   Component Value Date    HGB 10.7 (L) 03/06/2017    HGB 10.4 (L) 03/05/2017    HGB 12.6 (L) 04/11/2016     Lab Results   Component Value Date    HCT 32.5 (L) 03/06/2017    HCT 31.4 (L) 03/05/2017    HCT 38.4 (L) 04/11/2016     Assessment/Plan:  Reviewed above labs. INR is 5.77.  INR is 2-3 goal. Level is supra therapeutic. Will continue to HOLD warfarin. Reviewed pt medication list. Pharmacy will monitor and adjust dose accordingly.    Love Hall VALENTIN  03/06/17 12:07 PM     "

## 2017-03-06 NOTE — PLAN OF CARE
Problem: Patient Care Overview (Adult)  Goal: Plan of Care Review  Outcome: Outcome(s) achieved Date Met:  03/05/17 03/05/17 1806   Coping/Psychosocial Response Interventions   Plan Of Care Reviewed With patient   Patient Care Overview   Progress no change       Goal: Adult Individualization and Mutuality  Outcome: Outcome(s) achieved Date Met:  03/05/17  Goal: Discharge Needs Assessment  Outcome: Outcome(s) achieved Date Met:  03/05/17    Problem: Fluid Volume Excess (Adult,Obstetrics,Pediatric)  Goal: Identify Related Risk Factors and Signs and Symptoms  Outcome: Outcome(s) achieved Date Met:  03/05/17  Goal: Stable Weight  Outcome: Ongoing (interventions implemented as appropriate)  Goal: Balanced Intake/Output  Outcome: Ongoing (interventions implemented as appropriate)    Problem: Pain, Acute (Adult)  Goal: Acceptable Pain Control/Comfort Level  Outcome: Outcome(s) achieved Date Met:  03/05/17    Problem: Renal Failure/Kidney Injury, Acute (Adult)  Goal: Signs and Symptoms of Listed Potential Problems Will be Absent or Manageable (Renal Failure/Kidney Injury, Acute)  Outcome: Ongoing (interventions implemented as appropriate)    Problem: Fall Risk (Adult)  Goal: Identify Related Risk Factors and Signs and Symptoms  Outcome: Outcome(s) achieved Date Met:  03/05/17  Goal: Absence of Falls  Outcome: Ongoing (interventions implemented as appropriate)

## 2017-03-06 NOTE — PROGRESS NOTES
[facility] Medicine Services  INPATIENT PROGRESS NOTE    Length of Stay: 12  Date of Admission: 3/5/2017  Primary Care Physician: Dago Lopez MD    Subjective   Chief Complaint: shortness of breath  HPI:  Pt states that his shortness of breath is somewhat better, but not at baseline.    Review of Systems   Constitutional: Negative for appetite change, chills, fatigue and fever.   Respiratory: Negative for chest tightness and shortness of breath.    Cardiovascular: Negative for chest pain, palpitations and leg swelling.   Gastrointestinal: Negative for abdominal pain, constipation, diarrhea, nausea and vomiting.   Skin: Negative for wound.   Neurological: Negative for dizziness, weakness, light-headedness, numbness and headaches.        All pertinent negatives and positives are as above. All other systems have been reviewed and are negative unless otherwise stated.     Objective    Temp:  [97.2 °F (36.2 °C)-98.8 °F (37.1 °C)] 98.2 °F (36.8 °C)  Heart Rate:  [] 81  Resp:  [18-24] 18  BP: (108-170)/(74-92) 170/80    Physical Exam   Constitutional: He appears well-developed and well-nourished.   HENT:   Head: Normocephalic and atraumatic.   Eyes: EOM are normal. Pupils are equal, round, and reactive to light.   Neck: Normal range of motion. Neck supple.   Cardiovascular: Normal rate, regular rhythm, normal heart sounds and intact distal pulses.  Exam reveals no gallop and no friction rub.    No murmur heard.  Pulmonary/Chest: Effort normal and breath sounds normal. No respiratory distress. He has no wheezes. He has no rales. He exhibits no tenderness.   Abdominal: Soft. Bowel sounds are normal. He exhibits no distension. There is no tenderness.   Psychiatric: He has a normal mood and affect. His behavior is normal.   Vitals reviewed.          Results Review:  I have reviewed the labs, radiology results, and diagnostic studies.    Laboratory Data:     Results from  last 7 days  Lab Units 03/06/17  0623 03/05/17  1051   SODIUM mmol/L 138 136*   POTASSIUM mmol/L 4.0 4.7   CHLORIDE mmol/L 104 104   TOTAL CO2 mmol/L 18.0* 18.0*   BUN mg/dL 58* 57*   CREATININE mg/dL 6.56* 6.15*   GLUCOSE mg/dL 86 105*   CALCIUM mg/dL 8.9 9.0   BILIRUBIN mg/dL 0.9 1.1   ALK PHOS U/L 81 74   ALT (SGPT) U/L 43 40   AST (SGOT) U/L 28 35   ANION GAP mmol/L 16.0* 14.0     Estimated Creatinine Clearance: 9.9 mL/min (by C-G formula based on Cr of 6.56).            Results from last 7 days  Lab Units 03/06/17  0623 03/05/17  1051   WBC 10*3/mm3 5.66 6.14   HEMOGLOBIN g/dL 10.7* 10.4*   HEMATOCRIT % 32.5* 31.4*   PLATELETS 10*3/mm3 212 212       Results from last 7 days  Lab Units 03/06/17  0623 03/05/17  1630 03/05/17  1051   INR  5.77* 6.46* 6.29*       Culture Data:   BLOOD CULTURE   Date Value Ref Range Status   03/05/2017 No growth at less than 24 hours  Preliminary   03/05/2017 No growth at less than 24 hours  Preliminary     URINE CULTURE   Date Value Ref Range Status   03/05/2017 Culture in progress  Preliminary     Radiology Data:   Imaging Results (last 24 hours)     Procedure Component Value Units Date/Time    XR Chest 2 View [[id number]] Collected:  03/05/17 1104     Updated:  03/05/17 1107    Narrative:       Patient Name:  MR. SARITA SMITH  Patient ID:  [id number]   Ordering:  ROSANGELA RAMEY  Attending:  ROSANGELA RAMEY  Referring:  ROSANGELA RAMEY  ------------------------------------------------  Chest PA and lateral  CLINICAL INDICATION: Shortness of breath.    COMPARISON: Chest October 9, 2015.    FINDINGS: Midline sternal sutures from prior cardiac surgery.  Interval development of bilateral basilar infiltrative changes  and small pleural effusions. Interval development of prominence  of the pulmonary vasculature. Unfavorable change since prior  exam.      Impression:       CONCLUSION: Interval development of bilateral infiltrative  changes either atelectasis or pneumonitis and small  bilateral  pleural effusions. Interval development of prominence of the  pulmonary vasculature. Unfavorable change since prior exam.    Electronically signed by:  Jono Dillon MD  3/5/2017 11:06 AM CST  Workstation: [id number]          I have reviewed the patient current medications.     Assessment/Plan     Hospital Problem List     Pneumonia of both lungs due to infectious organism          Plan:    1.  COSMO on CKD:  Creatinine is worse today.  Pt is still not willing to do hemodialysis.  Dr. Holguin is following.  Continue current treatment.  2.  Bilateral Lower lobe pneumonia:  Continue antibiotics.  Cultures pending so far.  3.  CAD  4.  Afib:  Continue treatment                 Discharge Planning: I expect patient to be discharged to 2 in 3 days.    Stas Van MD   03/06/17   8:53 AM

## 2017-03-07 NOTE — PLAN OF CARE
Problem: Patient Care Overview (Adult)  Goal: Plan of Care Review  Outcome: Ongoing (interventions implemented as appropriate)    03/05/17 1215 03/06/17 2150 03/07/17 0415   Coping/Psychosocial Response Interventions   Plan Of Care Reviewed With --  patient --    Patient Care Overview   Progress no change --  --    Outcome Evaluation   Outcome Summary/Follow up Plan --  --  Pt up out of bed once during night.       Goal: Adult Individualization and Mutuality  Outcome: Ongoing (interventions implemented as appropriate)  Goal: Discharge Needs Assessment  Outcome: Ongoing (interventions implemented as appropriate)    Problem: Fluid Volume Excess (Adult,Obstetrics,Pediatric)  Goal: Stable Weight  Outcome: Ongoing (interventions implemented as appropriate)  Goal: Balanced Intake/Output  Outcome: Ongoing (interventions implemented as appropriate)    Problem: Pain, Acute (Adult)  Goal: Identify Related Risk Factors and Signs and Symptoms  Outcome: Outcome(s) achieved Date Met:  03/07/17  Goal: Acceptable Pain Control/Comfort Level  Outcome: Ongoing (interventions implemented as appropriate)    Problem: Renal Failure/Kidney Injury, Acute (Adult)  Goal: Signs and Symptoms of Listed Potential Problems Will be Absent or Manageable (Renal Failure/Kidney Injury, Acute)  Outcome: Ongoing (interventions implemented as appropriate)    Problem: Fall Risk (Adult)  Goal: Absence of Falls  Outcome: Ongoing (interventions implemented as appropriate)

## 2017-03-07 NOTE — PLAN OF CARE
Problem: Patient Care Overview (Adult)  Goal: Plan of Care Review  Outcome: Ongoing (interventions implemented as appropriate)    03/07/17 4189   Coping/Psychosocial Response Interventions   Plan Of Care Reviewed With patient;spouse   Patient Care Overview   Progress no change       Goal: Adult Individualization and Mutuality  Outcome: Ongoing (interventions implemented as appropriate)  Goal: Discharge Needs Assessment  Outcome: Ongoing (interventions implemented as appropriate)    Problem: Fluid Volume Excess (Adult,Obstetrics,Pediatric)  Goal: Stable Weight  Outcome: Ongoing (interventions implemented as appropriate)  Goal: Balanced Intake/Output  Outcome: Ongoing (interventions implemented as appropriate)    Problem: Pain, Acute (Adult)  Goal: Acceptable Pain Control/Comfort Level  Outcome: Ongoing (interventions implemented as appropriate)    Problem: Renal Failure/Kidney Injury, Acute (Adult)  Goal: Signs and Symptoms of Listed Potential Problems Will be Absent or Manageable (Renal Failure/Kidney Injury, Acute)  Outcome: Ongoing (interventions implemented as appropriate)    Problem: Fall Risk (Adult)  Goal: Absence of Falls  Outcome: Ongoing (interventions implemented as appropriate)

## 2017-03-07 NOTE — PROGRESS NOTES
[facility] Medicine Services  INPATIENT PROGRESS NOTE    Length of Stay: 22  Date of Admission: 3/5/2017  Primary Care Physician: Dago Lopez MD    Subjective   Chief Complaint: shortness of breath  HPI:  Pt states that his shortness of breath is somewhat better, but not at baseline.    Review of Systems   Constitutional: Positive for fatigue. Negative for appetite change, chills and fever.   Respiratory: Positive for shortness of breath. Negative for chest tightness.    Cardiovascular: Negative for chest pain, palpitations and leg swelling.   Gastrointestinal: Negative for abdominal pain, constipation, diarrhea, nausea and vomiting.   Skin: Negative for wound.   Neurological: Negative for dizziness, weakness, light-headedness, numbness and headaches.        All pertinent negatives and positives are as above. All other systems have been reviewed and are negative unless otherwise stated.     Objective    Temp:  [96.9 °F (36.1 °C)-97.7 °F (36.5 °C)] 97.7 °F (36.5 °C)  Heart Rate:  [] 101  Resp:  [18] 18  BP: (125-139)/(79-84) 125/84    Physical Exam   Constitutional: He appears well-developed and well-nourished.   HENT:   Head: Normocephalic and atraumatic.   Eyes: EOM are normal. Pupils are equal, round, and reactive to light.   Neck: Normal range of motion. Neck supple.   Cardiovascular: Normal rate, regular rhythm, normal heart sounds and intact distal pulses.  Exam reveals no gallop and no friction rub.    No murmur heard.  Pulmonary/Chest: Effort normal and breath sounds normal. No respiratory distress. He has no wheezes. He has no rales. He exhibits no tenderness.   Faint crackles bilaterally   Abdominal: Soft. Bowel sounds are normal. He exhibits no distension. There is no tenderness.   Musculoskeletal: He exhibits edema.   Psychiatric: He has a normal mood and affect. His behavior is normal.   Vitals reviewed.          Results Review:  I have reviewed  the labs, radiology results, and diagnostic studies.    Laboratory Data:     Results from last 7 days  Lab Units 03/07/17  0610 03/06/17  0623 03/05/17  1051   SODIUM mmol/L 138 138 136*   POTASSIUM mmol/L 3.7 4.0 4.7   CHLORIDE mmol/L 104 104 104   TOTAL CO2 mmol/L 20.0* 18.0* 18.0*   BUN mg/dL 54* 58* 57*   CREATININE mg/dL 6.17* 6.56* 6.15*   GLUCOSE mg/dL 95 86 105*   CALCIUM mg/dL 8.7 8.9 9.0   BILIRUBIN mg/dL 0.9 0.9 1.1   ALK PHOS U/L 72 81 74   ALT (SGPT) U/L 29 43 40   AST (SGOT) U/L 20 28 35   ANION GAP mmol/L 14.0 16.0* 14.0     Estimated Creatinine Clearance: 9.8 mL/min (by C-G formula based on Cr of 6.17).    Results from last 7 days  Lab Units 03/07/17  0610   PHOSPHORUS mg/dL 4.5*           Results from last 7 days  Lab Units 03/07/17  0610 03/06/17  0623 03/05/17  1051   WBC 10*3/mm3 5.14 5.66 6.14   HEMOGLOBIN g/dL 10.4* 10.7* 10.4*   HEMATOCRIT % 31.5* 32.5* 31.4*   PLATELETS 10*3/mm3 191 212 212       Results from last 7 days  Lab Units 03/07/17  0610 03/06/17  0623 03/05/17  1630 03/05/17  1051   INR  4.28* 5.77* 6.46* 6.29*       Culture Data:   BLOOD CULTURE   Date Value Ref Range Status   03/05/2017 No growth at 2 days  Preliminary   03/05/2017 No growth at 2 days  Preliminary     URINE CULTURE   Date Value Ref Range Status   03/05/2017 Culture in progress  Final   03/05/2017 Mixed Culture  Final     Radiology Data:   Imaging Results (last 24 hours)     Procedure Component Value Units Date/Time    XR Chest 2 View [[id number]] Collected:  03/05/17 1104     Updated:  03/05/17 1107    Narrative:       Patient Name:  MR. SARITA ROMANO STONE  Patient ID:  [id number]   Ordering:  ROSANGELA RAMEY  Attending:  ROSANGELA RAMEY  Referring:  ROSANGELA RAMEY  ------------------------------------------------  Chest PA and lateral  CLINICAL INDICATION: Shortness of breath.    COMPARISON: Chest October 9, 2015.    FINDINGS: Midline sternal sutures from prior cardiac surgery.  Interval development of bilateral  basilar infiltrative changes  and small pleural effusions. Interval development of prominence  of the pulmonary vasculature. Unfavorable change since prior  exam.      Impression:       CONCLUSION: Interval development of bilateral infiltrative  changes either atelectasis or pneumonitis and small bilateral  pleural effusions. Interval development of prominence of the  pulmonary vasculature. Unfavorable change since prior exam.    Electronically signed by:  Jono Dillon MD  3/5/2017 11:06 AM CST  Workstation: [id number]          I have reviewed the patient current medications.     Assessment/Plan     Hospital Problem List     Pneumonia of both lungs due to infectious organism          Plan:    1.  COSMO on CKD:  Creatinine is somewhat better today.  Pt is still not willing to do hemodialysis.  Dr. Holguin is following.  Continue current treatment.  2.  Bilateral Lower lobe pneumonia:  Continue antibiotics.  Cultures pending so far.  3.  CAD  4.  Afib:  Continue treatment                 Discharge Planning: I expect patient to be discharged to 2 in 3 days.    Stas Van MD   03/07/17   5:39 PM

## 2017-03-07 NOTE — PROGRESS NOTES
"    RENAL Note    CC: COSMO on CKD    PMH:   Past Medical History   Diagnosis Date   • A-fib    • BPH (benign prostatic hyperplasia)    • Coronary artery disease    • Kidney disease    • Macular degeneration        Current Meds: Scheduled Meds:  amLODIPine 10 mg Oral Nightly   azithromycin 500 mg Intravenous Q24H   carvedilol 6.25 mg Oral BID With Meals   ceftriaxone 1 g Intravenous Q24H   furosemide 40 mg Intravenous BID   isosorbide mononitrate 30 mg Oral Daily   ranolazine 500 mg Oral BID     Continuous Infusions:  Pharmacy to dose warfarin      PRN Meds:.•  acetaminophen-codeine  •  Pharmacy to dose warfarin  •  sodium chloride    Examination:    Visit Vitals   • /82 (BP Location: Left arm, Patient Position: Lying)   • Pulse 105   • Temp 97.7 °F (36.5 °C) (Oral)   • Resp 18   • Ht 74\" (188 cm)   • Wt 168 lb (76.2 kg)   • SpO2 94%   • BMI 21.57 kg/m2         No JVD    Pulm: Coarse    Heart:  No gallop or rub, Irreg    Abdomen: Soft,     Ext: Warm, Edema    Neuro: Stable        Results from last 7 days  Lab Units 03/07/17  0610 03/06/17  0623 03/05/17  1051   SODIUM mmol/L 138 138 136*   POTASSIUM mmol/L 3.7 4.0 4.7   CHLORIDE mmol/L 104 104 104   TOTAL CO2 mmol/L 20.0* 18.0* 18.0*   BUN mg/dL 54* 58* 57*   CREATININE mg/dL 6.17* 6.56* 6.15*   GLUCOSE mg/dL 95 86 105*   CALCIUM mg/dL 8.7 8.9 9.0   PHOSPHORUS mg/dL 4.5*  --   --          Results from last 7 days  Lab Units 03/07/17  0610 03/06/17  0623 03/05/17  1051   WBC 10*3/mm3 5.14 5.66 6.14   HEMOGLOBIN g/dL 10.4* 10.7* 10.4*   HEMATOCRIT % 31.5* 32.5* 31.4*   PLATELETS 10*3/mm3 191 212 212           Impression:    COSMO on CKD    Fluid overload    HTN    Rx Plans:    He is feeling better    He decided against dialysis.  Electrolytes, acid base stable.  No uremic signs or symptoms.  No indication for emergent dialysis today other than volume overload.     IV lasix    Plan of care discussed with his daughter  "

## 2017-03-07 NOTE — PROGRESS NOTES
"Anticoagulation by Pharmacy - Warfarin    [place] He[place] Middleton is a 83 y.o.male  [Ht: 74\" (188 cm); Wt: 168 lb (76.2 kg)] on Warfarin for indication of a fib / active PE.    Goal INR: 2-3  Today's INR:   Lab Results   Component Value Date    INR 4.28 (H) 03/07/2017         Lab Results   Component Value Date    INR 4.28 (H) 03/07/2017    INR 5.77 (C) 03/06/2017    INR 6.46 (C) 03/05/2017    PROTIME 39.4 (H) 03/07/2017    PROTIME 49.3 (H) 03/06/2017    PROTIME 53.7 (H) 03/05/2017     Lab Results   Component Value Date    HGB 10.4 (L) 03/07/2017    HGB 10.7 (L) 03/06/2017    HGB 10.4 (L) 03/05/2017     Lab Results   Component Value Date    HCT 31.5 (L) 03/07/2017    HCT 32.5 (L) 03/06/2017    HCT 31.4 (L) 03/05/2017     Assessment/Plan:  INR 4.28 and supratherapeutic, will continue to hold Warfarin until INR within range.    Rasta Stovall, [facility]  03/07/17 8:50 AM     "

## 2017-03-07 NOTE — PROGRESS NOTES
Discharge Planning Assessment  [facility]     Patient Name: Roni Middleton  MRN: [id number]  Today's Date: 3/7/2017    Admit Date: 3/5/2017          Discharge Needs Assessment       03/07/17 0958    Discharge Needs Assessment    Concerns To Be Addressed adjustment to diagnosis/illness concerns    Outpatient/Agency/Support Group Needs home hospice   Patient needs dialysis but refuses, will consider hospice    Anticipated Changes Related to Illness inability to care for self    Equipment Currently Used at Home grab bar;raised toilet   Has handicap shower and commode in bathroom    Equipment Needed After Discharge other (see comments)   Unable to determine at this time    Transportation Available car   He still drives and spouse can drive also    Current Discharge Risk terminally ill    Discharge Disposition home or self-care   vs dialysis vs home with hospice    Discharge Planning Comments Patient does not want dialysis at this time but needs some time to decide dialysis vs hospice            Discharge Plan       03/07/17 1004    Case Management/Social Work Plan    Plan Dialysis outpatient vs home with hospice    Additional Comments Patient and family still trying to make decision. Outpatient dialysis vs peritoneal dialysis vs home with hospice        Discharge Placement     No information found        Expected Discharge Date and Time     Expected Discharge Date Expected Discharge Time    Mar 9, 2017               Demographic Summary       03/07/17 0956    Referral Information    Admission Type inpatient    Arrived From admitted as an inpatient;home or self-care    Referral Source high risk screening;admission list    Reason For Consult discharge planning    Record Reviewed clinical discipline documentation;history and physical;medical record;patient profile    Primary Care Physician Information    Name Dago Lopez MD, [place]            Functional Status     None            Psychosocial     None             Abuse/Neglect     None            Legal     None            Substance Abuse     None            Patient Forms     None          Rosie Doan

## 2017-03-07 NOTE — PLAN OF CARE
Problem: Fluid Volume Excess (Adult,Obstetrics,Pediatric)  Goal: Stable Weight  Outcome: Ongoing (interventions implemented as appropriate)    03/06/17 2105   Fluid Volume Excess (Adult,Obstetrics,Pediatric)   Stable Weight making progress toward outcome       Goal: Balanced Intake/Output  Outcome: Ongoing (interventions implemented as appropriate)    Problem: Pain, Acute (Adult)  Goal: Identify Related Risk Factors and Signs and Symptoms  Outcome: Ongoing (interventions implemented as appropriate)    Problem: Renal Failure/Kidney Injury, Acute (Adult)  Goal: Signs and Symptoms of Listed Potential Problems Will be Absent or Manageable (Renal Failure/Kidney Injury, Acute)  Outcome: Ongoing (interventions implemented as appropriate)    Problem: Fall Risk (Adult)  Goal: Absence of Falls  Outcome: Ongoing (interventions implemented as appropriate)

## 2017-03-08 NOTE — PROGRESS NOTES
"Anticoagulation by Pharmacy - Warfarin    [place] Heber Middleton is a 83 y.o.male  [Ht: 74\" (188 cm); Wt: 168 lb (76.2 kg)] on Warfarin 2 mg PO  for indication of a fib.    Goal INR: 2-3  Today's INR:   Lab Results   Component Value Date    INR 2.90 (H) 03/08/2017         Lab Results   Component Value Date    INR 2.90 (H) 03/08/2017    INR 4.28 (H) 03/07/2017    INR 5.77 (C) 03/06/2017    PROTIME 29.4 (H) 03/08/2017    PROTIME 39.4 (H) 03/07/2017    PROTIME 49.3 (H) 03/06/2017     Lab Results   Component Value Date    HGB 11.1 (L) 03/08/2017    HGB 10.4 (L) 03/07/2017    HGB 10.7 (L) 03/06/2017     Lab Results   Component Value Date    HCT 33.4 (L) 03/08/2017    HCT 31.5 (L) 03/07/2017    HCT 32.5 (L) 03/06/2017     Assessment/Plan:  INR back within goal range. Will start Warfarin 2mg tonight and adjust accordingly. Concurrent med include azithromycin and ceftriaxone.    Rasta Stovall VALENTIN  03/08/17 8:48 AM     "

## 2017-03-08 NOTE — PLAN OF CARE
Problem: Patient Care Overview (Adult)  Goal: Plan of Care Review  Outcome: Ongoing (interventions implemented as appropriate)    03/07/17 1559   Coping/Psychosocial Response Interventions   Plan Of Care Reviewed With patient;spouse   Patient Care Overview   Progress no change       Goal: Adult Individualization and Mutuality  Outcome: Ongoing (interventions implemented as appropriate)    03/07/17 1559   Individualization   Patient Specific Preferences Pt is [place]. Please speak loud.   Mutuality/Individual Preferences   What Anxieties, Fears or Concerns Do You Have About Your Health or Care? Fear of needing HD.       Goal: Discharge Needs Assessment  Outcome: Ongoing (interventions implemented as appropriate)    03/07/17 0958 03/07/17 1026   Discharge Needs Assessment   Concerns To Be Addressed adjustment to diagnosis/illness concerns --    Current Discharge Risk terminally ill --    Discharge Planning Comments Patient does not want dialysis at this time but needs some time to decide dialysis vs hospice --    Current Health   Outpatient/Agency/Support Group Needs --  homecare agency (specify level of care)  (Patient has had [facility] in the past and would use them again if ordered)   Anticipated Changes Related to Illness inability to care for self --          Problem: Fluid Volume Excess (Adult,Obstetrics,Pediatric)  Goal: Stable Weight  Outcome: Ongoing (interventions implemented as appropriate)    03/08/17 0642   Fluid Volume Excess (Adult,Obstetrics,Pediatric)   Stable Weight making progress toward outcome       Goal: Balanced Intake/Output  Outcome: Ongoing (interventions implemented as appropriate)    03/08/17 0642   Fluid Volume Excess (Adult,Obstetrics,Pediatric)   Balanced Intake/Output making progress toward outcome         Problem: Pain, Acute (Adult)  Goal: Acceptable Pain Control/Comfort Level  Outcome: Ongoing (interventions implemented as appropriate)    03/08/17 0642   Pain, Acute  (Adult)   Acceptable Pain Control/Comfort Level achieves outcome         Problem: Renal Failure/Kidney Injury, Acute (Adult)  Goal: Signs and Symptoms of Listed Potential Problems Will be Absent or Manageable (Renal Failure/Kidney Injury, Acute)  Outcome: Ongoing (interventions implemented as appropriate)    03/08/17 0642   Renal Failure/Kidney Injury, Acute   Problems Assessed (Acute Renal Failure/Kidney Injury) all   Problems Present (Acute Renal Failure/Kidney Injury) electrolyte imbalance;infection;situational response;fluid imbalance         Problem: Fall Risk (Adult)  Goal: Absence of Falls  Outcome: Ongoing (interventions implemented as appropriate)    03/08/17 0642   Fall Risk (Adult)   Absence of Falls achieves outcome

## 2017-03-08 NOTE — PROGRESS NOTES
[facility] Medicine Services  INPATIENT PROGRESS NOTE    Length of Stay: 32  Date of Admission: 3/5/2017  Primary Care Physician: Dago Lopez MD    Subjective   Chief Complaint: shortness of breath significantly improved  HPI:  Pt states that his shortness of breath is better, nearing baseline.  Still has no interest in hemodialysis.    Review of Systems   Constitutional: Positive for fatigue. Negative for appetite change, chills and fever.   Respiratory: Positive for shortness of breath. Negative for chest tightness.    Cardiovascular: Negative for chest pain, palpitations and leg swelling.   Gastrointestinal: Negative for abdominal pain, constipation, diarrhea, nausea and vomiting.   Skin: Negative for wound.   Neurological: Negative for dizziness, weakness, light-headedness, numbness and headaches.        All pertinent negatives and positives are as above. All other systems have been reviewed and are negative unless otherwise stated.     Objective    Temp:  [96.3 °F (35.7 °C)-98.1 °F (36.7 °C)] 96.3 °F (35.7 °C)  Heart Rate:  [] 85  Resp:  [18-20] 20  BP: (125-133)/(84-91) 133/91    Physical Exam   Constitutional: He appears well-developed and well-nourished.   HENT:   Head: Normocephalic and atraumatic.   Eyes: EOM are normal. Pupils are equal, round, and reactive to light.   Neck: Normal range of motion. Neck supple.   Cardiovascular: Normal rate, regular rhythm, normal heart sounds and intact distal pulses.  Exam reveals no gallop and no friction rub.    No murmur heard.  Pulmonary/Chest: Effort normal and breath sounds normal. No respiratory distress. He has no wheezes. He has no rales. He exhibits no tenderness.   Faint crackles bilaterally   Abdominal: Soft. Bowel sounds are normal. He exhibits no distension. There is no tenderness.   Musculoskeletal: He exhibits edema.   Psychiatric: He has a normal mood and affect. His behavior is normal.   Vitals  reviewed.          Results Review:  I have reviewed the labs, radiology results, and diagnostic studies.    Laboratory Data:     Results from last 7 days  Lab Units 03/08/17  0535 03/07/17  0610 03/06/17  0623   SODIUM mmol/L 140 138 138   POTASSIUM mmol/L 3.6 3.7 4.0   CHLORIDE mmol/L 103 104 104   TOTAL CO2 mmol/L 21.0* 20.0* 18.0*   BUN mg/dL 54* 54* 58*   CREATININE mg/dL 6.17* 6.17* 6.56*   GLUCOSE mg/dL 100 95 86   CALCIUM mg/dL 8.8 8.7 8.9   BILIRUBIN mg/dL 0.9 0.9 0.9   ALK PHOS U/L 80 72 81   ALT (SGPT) U/L 30 29 43   AST (SGOT) U/L 23 20 28   ANION GAP mmol/L 16.0* 14.0 16.0*     Estimated Creatinine Clearance: 9.8 mL/min (by C-G formula based on Cr of 6.17).    Results from last 7 days  Lab Units 03/08/17  0535 03/07/17  0610   PHOSPHORUS mg/dL 4.8* 4.5*           Results from last 7 days  Lab Units 03/08/17  0535 03/07/17  0610 03/06/17  0623 03/05/17  1051   WBC 10*3/mm3 5.46 5.14 5.66 6.14   HEMOGLOBIN g/dL 11.1* 10.4* 10.7* 10.4*   HEMATOCRIT % 33.4* 31.5* 32.5* 31.4*   PLATELETS 10*3/mm3 201 191 212 212       Results from last 7 days  Lab Units 03/08/17  0535 03/07/17  0610 03/06/17  0623 03/05/17  1630 03/05/17  1051   INR  2.90* 4.28* 5.77* 6.46* 6.29*       Culture Data:   BLOOD CULTURE   Date Value Ref Range Status   03/05/2017 No growth at 2 days  Preliminary   03/05/2017 No growth at 2 days  Preliminary     URINE CULTURE   Date Value Ref Range Status   03/05/2017 Culture in progress  Final   03/05/2017 Mixed Culture  Final     Radiology Data:   Imaging Results (last 24 hours)     Procedure Component Value Units Date/Time    XR Chest 2 View [[id number]] Collected:  03/05/17 1104     Updated:  03/05/17 1107    Narrative:       Patient Name:  MR. SARITA SMITH  Patient ID:  [id number]   Ordering:  ROSANGELA RAMEY  Attending:  ROSANGELA RAMEY  Referring:  ROSANGELA RAMEY  ------------------------------------------------  Chest PA and lateral  CLINICAL INDICATION: Shortness of  breath.    COMPARISON: Chest October 9, 2015.    FINDINGS: Midline sternal sutures from prior cardiac surgery.  Interval development of bilateral basilar infiltrative changes  and small pleural effusions. Interval development of prominence  of the pulmonary vasculature. Unfavorable change since prior  exam.      Impression:       CONCLUSION: Interval development of bilateral infiltrative  changes either atelectasis or pneumonitis and small bilateral  pleural effusions. Interval development of prominence of the  pulmonary vasculature. Unfavorable change since prior exam.    Electronically signed by:  Jono Dillon MD  3/5/2017 11:06 AM CST  Workstation: [id number]          I have reviewed the patient current medications.     Assessment/Plan     Hospital Problem List     Pneumonia of both lungs due to infectious organism          Plan:    1.  COSMO on CKD:  Creatinine is stable today.  Pt is still not interested in hemodialysis.  Discussed with Dr. Holguin.  Continue current treatment.  2.  Bilateral Lower lobe pneumonia:  Continue antibiotics.  Cultures pending so far.  3.  CAD  4.  Afib:  Continue treatment         Discharge Planning:     Stas Van MD   03/08/17   8:53 AM

## 2017-03-08 NOTE — PROGRESS NOTES
Nephrology Progress Note:    Breathing status is improved.  Still has some edema of the lower extremities.  Unable to lie flat.  No significant expectoration.  Urine output is good.  His family is at bedside.  Appetite is improving.  He is on IV Lasix and antibiotics.  Patient does not want to consider dialysis treatments.    Patient Active Problem List   Diagnosis   • Pulmonary embolism   • Long-term (current) use of anticoagulants   • Pneumonia of both lungs due to infectious organism       Medications:    amLODIPine 10 mg Oral Nightly   azithromycin 500 mg Intravenous Q24H   carvedilol 6.25 mg Oral BID With Meals   ceftriaxone 1 g Intravenous Q24H   furosemide 40 mg Intravenous BID   isosorbide mononitrate 30 mg Oral Daily   ranolazine 500 mg Oral BID   warfarin 2 mg Oral Daily       Pharmacy to dose warfarin      Vitals:    03/08/17 0723 03/08/17 0742 03/08/17 1524 03/08/17 1529   BP: 133/91  131/78    BP Location: Left arm  Right arm    Patient Position: Sitting  Sitting    Pulse: 108 85 98 81   Resp: 20  18    Temp: 96.3 °F (35.7 °C)  96.3 °F (35.7 °C)    TempSrc: Oral  Oral    SpO2: 95%  90%    Weight:       Height:         I/O last 3 completed shifts:  In: 1080 [P.O.:1080]  Out: 4215 [Urine:4215]  I/O this shift:  In: 840 [P.O.:840]  Out: -     On examination:  General:   Thin built, comfortable.  No distress.  Alert and oriented.   Family at bedside  HEENT:  Pallor present, no icterus.    Chest:  Diminished breath sounds, no rales or wheeze  CVS:   Irregular heart sounds, no pericardial rub or gallop   Abdomen: soft, distended, non tender, normal bowel sounds.    Extremities:  1+ dewayne edema.   Arm edema, improving.   No calf pain   Neuro:  Grade 4 power both upper and lower limbs.   Mentation:  Alert and oriented.      Past medical illness, social history, medications, previous notes reviewed.       Laboratory results:      Recent Results (from the past 24 hour(s))   Comprehensive Metabolic Panel     Collection Time: 03/08/17  5:35 AM   Result Value Ref Range    Glucose 100 60 - 100 mg/dL    BUN 54 (H) 7 - 21 mg/dL    Creatinine 6.17 (H) 0.70 - 1.30 mg/dL    Sodium 140 137 - 145 mmol/L    Potassium 3.6 3.5 - 5.1 mmol/L    Chloride 103 95 - 110 mmol/L    CO2 21.0 (L) 22.0 - 31.0 mmol/L    Calcium 8.8 8.4 - 10.2 mg/dL    Total Protein 6.9 6.3 - 8.6 g/dL    Albumin 4.20 3.40 - 4.80 g/dL    ALT (SGPT) 30 21 - 72 U/L    AST (SGOT) 23 17 - 59 U/L    Alkaline Phosphatase 80 38 - 126 U/L    Total Bilirubin 0.9 0.2 - 1.3 mg/dL    eGFR Non  Amer 9 (L) 42 - 98 mL/min/1.73    Globulin 2.7 2.3 - 3.5 gm/dL    A/G Ratio 1.6 1.1 - 1.8 g/dL    BUN/Creatinine Ratio 8.8 7.0 - 25.0    Anion Gap 16.0 (H) 5.0 - 15.0 mmol/L   Protime-INR    Collection Time: 03/08/17  5:35 AM   Result Value Ref Range    Protime 29.4 (H) 11.1 - 15.3 Seconds    INR 2.90 (H) 0.80 - 1.20   CBC Auto Differential    Collection Time: 03/08/17  5:35 AM   Result Value Ref Range    WBC 5.46 3.20 - 9.80 10*3/mm3    RBC 3.91 (L) 4.37 - 5.74 10*6/mm3    Hemoglobin 11.1 (L) 13.7 - 17.3 g/dL    Hematocrit 33.4 (L) 39.0 - 49.0 %    MCV 85.4 80.0 - 98.0 fL    MCH 28.4 26.5 - 34.0 pg    MCHC 33.2 31.5 - 36.3 g/dL    RDW 15.5 (H) 11.5 - 14.5 %    RDW-SD 47.8 (H) 35.1 - 43.9 fl    MPV 12.1 (H) 8.0 - 12.0 fL    Platelets 201 150 - 450 10*3/mm3    Neutrophil % 56.0 37.0 - 80.0 %    Lymphocyte % 23.1 10.0 - 50.0 %    Monocyte % 15.4 (H) 0.0 - 12.0 %    Eosinophil % 4.4 0.0 - 7.0 %    Basophil % 0.7 0.0 - 2.0 %    Immature Grans % 0.4 0.0 - 0.5 %    Neutrophils, Absolute 3.06 2.00 - 8.60 10*3/mm3    Lymphocytes, Absolute 1.26 0.60 - 4.20 10*3/mm3    Monocytes, Absolute 0.84 0.00 - 0.90 10*3/mm3    Eosinophils, Absolute 0.24 0.00 - 0.70 10*3/mm3    Basophils, Absolute 0.04 0.00 - 0.20 10*3/mm3    Immature Grans, Absolute 0.02 0.00 - 0.02 10*3/mm3   Phosphorus    Collection Time: 03/08/17  5:35 AM   Result Value Ref Range    Phosphorus 4.8 (H) 2.4 - 4.4 mg/dL    ]    Imaging Results (last 24 hours)     Procedure Component Value Units Date/Time    XR Chest 2 View [[id number]] Collected:  03/05/17 1104     Updated:  03/05/17 1107    Narrative:       Patient Name:  MR. SARITA SMITH  Patient ID:  [id number]   Ordering:  ROSANGELA RAMEY  Attending:  ROSANGELA RAMEY  Referring:  ROSANGELA RAMEY  ------------------------------------------------  Chest PA and lateral  CLINICAL INDICATION: Shortness of breath.    COMPARISON: Chest October 9, 2015.    FINDINGS: Midline sternal sutures from prior cardiac surgery.  Interval development of bilateral basilar infiltrative changes  and small pleural effusions. Interval development of prominence  of the pulmonary vasculature. Unfavorable change since prior  exam.      Impression:       CONCLUSION: Interval development of bilateral infiltrative  changes either atelectasis or pneumonitis and small bilateral  pleural effusions. Interval development of prominence of the  pulmonary vasculature. Unfavorable change since prior exam.    Electronically signed by:  Jono Dillon MD  3/5/2017 11:06 AM CST  Workstation: [id number]        Assessment:     Acute renal failure  Chronic kidney disease, stage 5  Shortness of breath   Fluid overload, edema  Possible lung infiltrates  Metabolic acidosis  CAD, CABG, Atrial fibrillation  Coagulopathy, INR high  Renal stones, obstr uropathy in past.     Plan:     Stage IV chronic kidney disease, now with worsened renal functions, likely ESRD.  Serum creatinine is 6.1.  Ultrasound show significant renal atrophy.  There is no obstruction or hydronephrosis.  History of renal stones in the past.  GFR is less than 10.  Bicarbonate level is slightly low.  Appetite has been poor, with some taste disturbances, increasing fatigue, uremic symptoms.  Patient does not want to consider dialysis treatments.  We have discussed this in detail during several office visits, and during this hospital stay.  I have discussed  with his family as well.  Different options of dialysis treatments, including home therapies had been reviewed with patient.  He has consistently told me that he will not go dialysis.    Edema, fluid overload.  This is improving.  His breathing status is improved, not using oxygen at this time.  He still has some complains of orthopnea and edema of the lower extremities.  Urine output is stable.  Continue IV Lasix.    Possible lung infiltrates.  On IV antibiotics.  No fever.  No significant expectoration.    Atrial fibrillation, chronic anticoagulation, essential hypertension: Heart rate is stable.  Blood pressure readings appear fairly stable.  He is on amlodipine, carvedilol, isosorbide.    Patient does not want to consider dialysis treatments.  At this time, symptomatically he is feeling better, and wishes to continue current treatment and see if his renal functions improves.  If worsening of symptoms, including uremic symptoms, should consider hospice evaluation.  I have briefly mentioned this to patient and family during my visit this afternoon.    Avoid nephrotoxic medications as much as possible.        Ezio Sweeney MD

## 2017-03-09 PROBLEM — I48.0 PAF (PAROXYSMAL ATRIAL FIBRILLATION) (HCC): Status: ACTIVE | Noted: 2017-01-01

## 2017-03-09 PROBLEM — E87.70 VOLUME OVERLOAD: Status: ACTIVE | Noted: 2017-01-01

## 2017-03-09 PROBLEM — N18.5 CKD (CHRONIC KIDNEY DISEASE) STAGE 5, GFR LESS THAN 15 ML/MIN (HCC): Status: ACTIVE | Noted: 2017-01-01

## 2017-03-09 NOTE — PROGRESS NOTES
Nephrology Progress Note:    Comfortable.  No shortness of breath at rest.  Getting IV antibiotic.  Urine output is stable.  Edema of the arms is improving.   Denies nausea or vomiting.  Appetite is improving.  Family told me that he may change his mind regarding dialysis.      Patient Active Problem List   Diagnosis   • Pulmonary embolism   • Long-term (current) use of anticoagulants   • Pneumonia of both lungs due to infectious organism       Medications:    amLODIPine 10 mg Oral Nightly   azithromycin 500 mg Intravenous Q24H   carvedilol 6.25 mg Oral BID With Meals   ceftriaxone 1 g Intravenous Q24H   furosemide 40 mg Intravenous BID   isosorbide mononitrate 30 mg Oral Daily   ranolazine 500 mg Oral BID   warfarin 2 mg Oral Daily       Pharmacy to dose warfarin      Vitals:    03/09/17 0100 03/09/17 0417 03/09/17 0716 03/09/17 0720   BP:  137/91  156/99   BP Location:  Right arm  Right arm   Patient Position:  Lying  Lying   Pulse: 98 108 116 118   Resp:  18  20   Temp:  98.2 °F (36.8 °C)  96.3 °F (35.7 °C)   TempSrc:  Temporal Artery   Oral   SpO2:  91%  92%   Weight:       Height:         I/O last 3 completed shifts:  In: 960 [P.O.:960]  Out: 1200 [Urine:1200]  I/O this shift:  In: 360 [P.O.:360]  Out: -     On examination:  General:   Thin built, comfortable.  No distress.  Alert and oriented.   Family at bedside  HEENT:  Pallor present, no icterus.    Chest:  Diminished breath sounds, no rales or wheeze  CVS:   Irregular heart sounds, no pericardial rub or gallop   Abdomen: soft, distended, non tender, normal bowel sounds.    Extremities:  Trace  dewayne edema.   Arm edema, improving.   No calf pain   Neuro:  Grade 4 power both upper and lower limbs.   Mentation:  Alert and oriented.      Past medical illness, social history, medications, previous notes reviewed.       Laboratory results:      Recent Results (from the past 24 hour(s))   Comprehensive Metabolic Panel    Collection Time: 03/09/17  5:54 AM   Result  Value Ref Range    Glucose 93 60 - 100 mg/dL    BUN 50 (H) 7 - 21 mg/dL    Creatinine 5.63 (H) 0.70 - 1.30 mg/dL    Sodium 139 137 - 145 mmol/L    Potassium 3.5 3.5 - 5.1 mmol/L    Chloride 101 95 - 110 mmol/L    CO2 21.0 (L) 22.0 - 31.0 mmol/L    Calcium 8.9 8.4 - 10.2 mg/dL    Total Protein 7.1 6.3 - 8.6 g/dL    Albumin 4.20 3.40 - 4.80 g/dL    ALT (SGPT) 31 21 - 72 U/L    AST (SGOT) 22 17 - 59 U/L    Alkaline Phosphatase 75 38 - 126 U/L    Total Bilirubin 0.9 0.2 - 1.3 mg/dL    eGFR Non African Amer 10 (L) >60 mL/min/1.73    Globulin 2.9 2.3 - 3.5 gm/dL    A/G Ratio 1.4 1.1 - 1.8 g/dL    BUN/Creatinine Ratio 8.9 7.0 - 25.0    Anion Gap 17.0 (H) 5.0 - 15.0 mmol/L   Protime-INR    Collection Time: 03/09/17  5:54 AM   Result Value Ref Range    Protime 23.4 (H) 11.1 - 15.3 Seconds    INR 2.14 (H) 0.80 - 1.20   CBC Auto Differential    Collection Time: 03/09/17  5:54 AM   Result Value Ref Range    WBC 6.57 3.20 - 9.80 10*3/mm3    RBC 4.06 (L) 4.37 - 5.74 10*6/mm3    Hemoglobin 11.4 (L) 13.7 - 17.3 g/dL    Hematocrit 34.6 (L) 39.0 - 49.0 %    MCV 85.2 80.0 - 98.0 fL    MCH 28.1 26.5 - 34.0 pg    MCHC 32.9 31.5 - 36.3 g/dL    RDW 15.6 (H) 11.5 - 14.5 %    RDW-SD 48.4 (H) 35.1 - 43.9 fl    MPV 11.9 8.0 - 12.0 fL    Platelets 213 150 - 450 10*3/mm3    Neutrophil % 54.9 37.0 - 80.0 %    Lymphocyte % 23.3 10.0 - 50.0 %    Monocyte % 14.9 (H) 0.0 - 12.0 %    Eosinophil % 5.3 0.0 - 7.0 %    Basophil % 0.8 0.0 - 2.0 %    Immature Grans % 0.8 (H) 0.0 - 0.5 %    Neutrophils, Absolute 3.61 2.00 - 8.60 10*3/mm3    Lymphocytes, Absolute 1.53 0.60 - 4.20 10*3/mm3    Monocytes, Absolute 0.98 (H) 0.00 - 0.90 10*3/mm3    Eosinophils, Absolute 0.35 0.00 - 0.70 10*3/mm3    Basophils, Absolute 0.05 0.00 - 0.20 10*3/mm3    Immature Grans, Absolute 0.05 (H) 0.00 - 0.02 10*3/mm3   ]    Imaging Results (last 24 hours)     Procedure Component Value Units Date/Time    XR Chest 2 View [[id number]] Collected:  03/05/17 1104     Updated:  03/05/17  1107    Narrative:       Patient Name:  MR. SARITA SMITH  Patient ID:  [id number]   Ordering:  ROSANGELA RAMEY  Attending:  ROSANGELA RAMEY  Referring:  ROSANGELA RAMEY  ------------------------------------------------  Chest PA and lateral  CLINICAL INDICATION: Shortness of breath.    COMPARISON: Chest October 9, 2015.    FINDINGS: Midline sternal sutures from prior cardiac surgery.  Interval development of bilateral basilar infiltrative changes  and small pleural effusions. Interval development of prominence  of the pulmonary vasculature. Unfavorable change since prior  exam.      Impression:       CONCLUSION: Interval development of bilateral infiltrative  changes either atelectasis or pneumonitis and small bilateral  pleural effusions. Interval development of prominence of the  pulmonary vasculature. Unfavorable change since prior exam.    Electronically signed by:  Jono Dillon MD  3/5/2017 11:06 AM CST  Workstation: [id number]        Assessment:     Acute renal failure  Chronic kidney disease, stage 5  Shortness of breath   Fluid overload, edema  Possible lung infiltrates  Metabolic acidosis  CAD, CABG, Atrial fibrillation  Coagulopathy, INR high  Renal stones, obstr uropathy in past.     Plan:     Stage V chronic disease: Serum creatinine is 5.6, marginally better.  Still, GFR is low at 10.  Urine output appears stable.  He is on IV antibiotics and IV Lasix.  Breathing status is improved.  States his appetite is improving.  He is able to ambulate a little bit without problems.  Still orthopneic at times.  Not using oxygen at rest.    Patient had refused dialysis.  I had talked about hospice evaluation as well.  Today family tells me that he may change his mind on dialysis.  I have talked to patient again regarding different modalities of dialysis, placement of a PermCath for initiation of dialysis, placement of AV fistula, and outpatient dialysis.  He cannot see well, I do not think he would be an ideal  candidate for peritoneal dialysis, his family does not think he can do peritoneal dialysis either.  At this time he is undecided, he does not want to go dialysis right now.  I have discussed the different options, preferably inpatient initiation of dialysis, outpatient evaluations (accepting the high risk of complications from uremia, volume overload and hyperkalemia), with patient in detail.  I have told patient and family that we will definitely go by his decision on whether he wants to consider dialysis or not.  Family asked me about his cardiac stability, I have discussed with them that cardiac complications including tachyarrhythmias, cardiac arrest are definitely complications we worry about during dialysis treatments in patients with heart disease.   IF he decides not to do dialysis, that would be a reasonable decision as well.       At this time patient has not made a decision.  If he wants to try dialysis, will consult vascular surgery for PermCath placement.  Anticoagulation will need to be held.    Blood pressure readings are stable.  Fluid status is improving.  He is on IV Lasix twice daily.     Possible lung infiltrates: Continued on IV antibiotics.    Electrolytes including potassium and calcium levels are stable.  Check PTH and phosphate level in the morning.  Patient is not on any nephrotoxic medications.  I have discussed with patient and his family in great detail, all questions have been answered at length.        Ezio Sweeney MD

## 2017-03-09 NOTE — PLAN OF CARE
Problem: Patient Care Overview (Adult)  Goal: Plan of Care Review  Outcome: Ongoing (interventions implemented as appropriate)    03/07/17 0415 03/07/17 1559 03/08/17 2130   Coping/Psychosocial Response Interventions   Plan Of Care Reviewed With --  --  patient;spouse   Patient Care Overview   Progress --  no change --    Outcome Evaluation   Outcome Summary/Follow up Plan Pt up out of bed once during night. --  --        Goal: Adult Individualization and Mutuality  Outcome: Ongoing (interventions implemented as appropriate)    Problem: Fluid Volume Excess (Adult,Obstetrics,Pediatric)  Goal: Stable Weight  Outcome: Ongoing (interventions implemented as appropriate)  Goal: Balanced Intake/Output  Outcome: Ongoing (interventions implemented as appropriate)    Problem: Pain, Acute (Adult)  Goal: Acceptable Pain Control/Comfort Level  Outcome: Ongoing (interventions implemented as appropriate)    Problem: Renal Failure/Kidney Injury, Acute (Adult)  Goal: Signs and Symptoms of Listed Potential Problems Will be Absent or Manageable (Renal Failure/Kidney Injury, Acute)  Outcome: Ongoing (interventions implemented as appropriate)    Problem: Fall Risk (Adult)  Goal: Absence of Falls  Outcome: Ongoing (interventions implemented as appropriate)

## 2017-03-09 NOTE — PROGRESS NOTES
"Anticoagulation by Pharmacy - Warfarin    [place] Heber Middleton is a 83 y.o.male  [Ht: 74\" (188 cm); Wt: 168 lb (76.2 kg)] on Warfarin 2 mg PO  for indication of atrial fibrillation.    Goal INR: 2-3  Today's INR:   Lab Results   Component Value Date    INR 2.14 (H) 03/09/2017         Lab Results   Component Value Date    INR 2.14 (H) 03/09/2017    INR 2.90 (H) 03/08/2017    INR 4.28 (H) 03/07/2017    PROTIME 23.4 (H) 03/09/2017    PROTIME 29.4 (H) 03/08/2017    PROTIME 39.4 (H) 03/07/2017     Lab Results   Component Value Date    HGB 11.4 (L) 03/09/2017    HGB 11.1 (L) 03/08/2017    HGB 10.4 (L) 03/07/2017     Lab Results   Component Value Date    HCT 34.6 (L) 03/09/2017    HCT 33.4 (L) 03/08/2017    HCT 31.5 (L) 03/07/2017     Assessment/Plan:  INR is trending down, however dose has been held for several day. Warfarin was started last pm at a dose of 2mg. Will continue with this dose and monitor closely.    Arnaldo Barber [facility]  03/09/17 10:34 AM     "

## 2017-03-10 NOTE — PROGRESS NOTES
"Anticoagulation by Pharmacy - Warfarin    [place] Heber Middleton is a 83 y.o.male  [Ht: 74\" (188 cm); Wt: 168 lb (76.2 kg)] on Warfarin 2.5 mg PO  for indication of atrial fibrillation.    Goal INR: 2-3  Today's INR:   Lab Results   Component Value Date    INR 1.93 (H) 03/10/2017         Lab Results   Component Value Date    INR 1.93 (H) 03/10/2017    INR 2.14 (H) 03/09/2017    INR 2.90 (H) 03/08/2017    PROTIME 21.6 (H) 03/10/2017    PROTIME 23.4 (H) 03/09/2017    PROTIME 29.4 (H) 03/08/2017     Lab Results   Component Value Date    HGB 12.4 (L) 03/10/2017    HGB 11.4 (L) 03/09/2017    HGB 11.1 (L) 03/08/2017     Lab Results   Component Value Date    HCT 36.9 (L) 03/10/2017    HCT 34.6 (L) 03/09/2017    HCT 33.4 (L) 03/08/2017     Assessment/Plan:  INR is trending down. Patient was receiving warfarin 2 mg po q 1800. Increased to 2.5 mg po today. Will monitor and adjust as needed.    Arnaldo Barber [facility]  03/10/17 10:10 AM       "

## 2017-03-10 NOTE — PROGRESS NOTES
[facility] Medicine Services  INPATIENT PROGRESS NOTE    Length of Stay: 5  Date of Admission: 3/5/2017  Primary Care Physician: Dago Lopez MD    Subjective   Chief Complaint: Dyspnea, better  HPI:  83 year old male admitted with dyspnea, worsening over the last 3 weeks. Symptoms continued to get worse, with increased shortness of breath and increased lower extremity edema. He also reports abdominal edema as well as upper genitalia edema. He has gained weight several pounds in the past few weeks.    He was admitted and treated for volume overload from advanced renal disease and pneumonia.  His dyspnea has improved.  He has elected for dialysis.    Review of Systems   Constitutional: Negative for chills and fever.   Respiratory: Negative for cough, shortness of breath and wheezing.    Cardiovascular: Negative for chest pain and leg swelling.   Gastrointestinal: Negative for abdominal pain, diarrhea, nausea and vomiting.   Neurological: Negative for headaches.      All pertinent negatives and positives are as above. All other systems have been reviewed and are negative unless otherwise stated.     Objective    Temp:  [97 °F (36.1 °C)-99.4 °F (37.4 °C)] 97 °F (36.1 °C)  Heart Rate:  [] 78  Resp:  [18] 18  BP: (129-156)/(73-94) 143/74    Physical Exam   Constitutional: He is oriented to person, place, and time. He appears well-developed and well-nourished. No distress.   HENT:   Head: Normocephalic and atraumatic.   Mouth/Throat: Oropharynx is clear and moist.   Eyes: Conjunctivae and EOM are normal.   Neck: Normal range of motion. Neck supple.   Cardiovascular: Normal rate, regular rhythm and intact distal pulses.  Exam reveals no gallop and no friction rub.    No murmur heard.  Pulmonary/Chest: Effort normal and breath sounds normal. No respiratory distress. He has no wheezes. He has no rales. He exhibits no tenderness.   Abdominal: Soft. Bowel sounds are  normal. He exhibits no distension. There is no tenderness.   Musculoskeletal: Normal range of motion. He exhibits no edema or deformity.   Neurological: He is alert and oriented to person, place, and time.   Skin: Skin is warm and dry. He is not diaphoretic. No erythema.   Vitals reviewed.      Results Review:  I have reviewed the labs, radiology results, and diagnostic studies.    Laboratory Data:     Results from last 7 days  Lab Units 03/10/17  0549 03/09/17  0554 03/08/17  0535   SODIUM mmol/L 142 139 140   POTASSIUM mmol/L 3.5 3.5 3.6   CHLORIDE mmol/L 99 101 103   TOTAL CO2 mmol/L 24.0 21.0* 21.0*   BUN mg/dL 49* 50* 54*   CREATININE mg/dL 5.36* 5.63* 6.17*   GLUCOSE mg/dL 92 93 100   CALCIUM mg/dL 9.1 8.9 8.8   BILIRUBIN mg/dL 1.0 0.9 0.9   ALK PHOS U/L 78 75 80   ALT (SGPT) U/L 27 31 30   AST (SGOT) U/L 28 22 23   ANION GAP mmol/L 19.0* 17.0* 16.0*     Estimated Creatinine Clearance: 11.3 mL/min (by C-G formula based on Cr of 5.36).    Results from last 7 days  Lab Units 03/10/17  0549 03/08/17  0535 03/07/17  0610   PHOSPHORUS mg/dL 4.6* 4.8* 4.5*           Results from last 7 days  Lab Units 03/10/17  0549 03/09/17  0554 03/08/17  0535 03/07/17  0610 03/06/17  0623   WBC 10*3/mm3 6.63 6.57 5.46 5.14 5.66   HEMOGLOBIN g/dL 12.4* 11.4* 11.1* 10.4* 10.7*   HEMATOCRIT % 36.9* 34.6* 33.4* 31.5* 32.5*   PLATELETS 10*3/mm3 223 213 201 191 212       Results from last 7 days  Lab Units 03/10/17  0549 03/09/17  0554 03/08/17  0535 03/07/17  0610 03/06/17  0623   INR  1.93* 2.14* 2.90* 4.28* 5.77*       Culture Data:   No results found for: BLOODCX  No results found for: URINECX  No results found for: RESPCX  No results found for: WOUNDCX  No results found for: STOOLCX  No components found for: BODYFLD    Radiology Data:   Imaging Results (last 24 hours)     ** No results found for the last 24 hours. **          I have reviewed the patient current medications.     Assessment/Plan     Hospital Problem List     *  (Principal)Pneumonia of both lungs due to infectious organism    Long-term (current) use of anticoagulants    CKD (chronic kidney disease) stage 5, GFR less than 15 ml/min    Volume overload    PAF (paroxysmal atrial fibrillation)          Plan:   Rocephin/Azithromycin  IV lasix  Renal following  Discussed with Dr. Singh the patients desire to pursue HD, he is to discuss with vascular for access.      Discharge Planning: I expect patient to be discharged to home when HD arranged.     Artem Dillard, APRN   03/10/17   10:36 AM

## 2017-03-10 NOTE — PROGRESS NOTES
Nephrology Progress Note:    Patient has decided to go dialysis.  His breathing status is improved.  Edema is improving.  Urine output is poor according to patient.  No diarrhea.  Appetite is improving.  On IV antibiotics.    Patient Active Problem List   Diagnosis   • Pulmonary embolism   • Long-term (current) use of anticoagulants   • Pneumonia of both lungs due to infectious organism   • CKD (chronic kidney disease) stage 5, GFR less than 15 ml/min   • Volume overload   • PAF (paroxysmal atrial fibrillation)       Medications:    amLODIPine 10 mg Oral Nightly   azithromycin 500 mg Intravenous Q24H   carvedilol 6.25 mg Oral BID With Meals   ceftriaxone 1 g Intravenous Q24H   furosemide 40 mg Intravenous BID   isosorbide mononitrate 30 mg Oral Daily   polyethylene glycol 17 g Oral Daily   ranolazine 500 mg Oral BID   warfarin 2.5 mg Oral Daily       Pharmacy to dose warfarin      Vitals:    03/09/17 2300 03/10/17 0430 03/10/17 0715 03/10/17 0840   BP:  129/73  143/74   BP Location:  Right arm  Right arm   Patient Position:  Lying  Lying   Pulse: 100 120 (!) 126 78   Resp:  18  18   Temp:  99.4 °F (37.4 °C)  97 °F (36.1 °C)   TempSrc:  Temporal Artery   Oral   SpO2:  90%  95%   Weight:       Height:         I/O last 3 completed shifts:  In: 900 [P.O.:900]  Out: -   I/O this shift:  In: 580 [P.O.:480; IV Piggyback:100]  Out: -     On examination:  General:   Thin built, comfortable.  No distress.  Alert and oriented.   Family at bedside  HEENT:  Pallor present, no icterus.    Chest:  Diminished breath sounds, no rales or wheeze  CVS:   Irregular heart sounds, no pericardial rub or gallop   Abdomen: soft, distended, non tender, normal bowel sounds.    Extremities:  Trace  dewayne edema.   Arm edema, improving.   No calf pain   Neuro:  Grade 4 power both upper and lower limbs.   Mentation:  Alert and oriented.      Past medical illness, social history, medications, previous notes reviewed.       Laboratory results:       Recent Results (from the past 24 hour(s))   Comprehensive Metabolic Panel    Collection Time: 03/10/17  5:49 AM   Result Value Ref Range    Glucose 92 60 - 100 mg/dL    BUN 49 (H) 7 - 21 mg/dL    Creatinine 5.36 (H) 0.70 - 1.30 mg/dL    Sodium 142 137 - 145 mmol/L    Potassium 3.5 3.5 - 5.1 mmol/L    Chloride 99 95 - 110 mmol/L    CO2 24.0 22.0 - 31.0 mmol/L    Calcium 9.1 8.4 - 10.2 mg/dL    Total Protein 7.6 6.3 - 8.6 g/dL    Albumin 4.40 3.40 - 4.80 g/dL    ALT (SGPT) 27 21 - 72 U/L    AST (SGOT) 28 17 - 59 U/L    Alkaline Phosphatase 78 38 - 126 U/L    Total Bilirubin 1.0 0.2 - 1.3 mg/dL    eGFR Non African Amer 10 (L) >60 mL/min/1.73    Globulin 3.2 2.3 - 3.5 gm/dL    A/G Ratio 1.4 1.1 - 1.8 g/dL    BUN/Creatinine Ratio 9.1 7.0 - 25.0    Anion Gap 19.0 (H) 5.0 - 15.0 mmol/L   Protime-INR    Collection Time: 03/10/17  5:49 AM   Result Value Ref Range    Protime 21.6 (H) 11.1 - 15.3 Seconds    INR 1.93 (H) 0.80 - 1.20   Phosphorus    Collection Time: 03/10/17  5:49 AM   Result Value Ref Range    Phosphorus 4.6 (H) 2.4 - 4.4 mg/dL   PTH, Intact    Collection Time: 03/10/17  5:49 AM   Result Value Ref Range    PTH, Intact 568.7 (H) 10.0 - 65.0 pg/mL   CBC Auto Differential    Collection Time: 03/10/17  5:49 AM   Result Value Ref Range    WBC 6.63 3.20 - 9.80 10*3/mm3    RBC 4.32 (L) 4.37 - 5.74 10*6/mm3    Hemoglobin 12.4 (L) 13.7 - 17.3 g/dL    Hematocrit 36.9 (L) 39.0 - 49.0 %    MCV 85.4 80.0 - 98.0 fL    MCH 28.7 26.5 - 34.0 pg    MCHC 33.6 31.5 - 36.3 g/dL    RDW 15.6 (H) 11.5 - 14.5 %    RDW-SD 48.5 (H) 35.1 - 43.9 fl    MPV 11.9 8.0 - 12.0 fL    Platelets 223 150 - 450 10*3/mm3    Neutrophil % 54.2 37.0 - 80.0 %    Lymphocyte % 29.7 10.0 - 50.0 %    Monocyte % 9.0 0.0 - 12.0 %    Eosinophil % 5.7 0.0 - 7.0 %    Basophil % 0.8 0.0 - 2.0 %    Immature Grans % 0.6 (H) 0.0 - 0.5 %    Neutrophils, Absolute 3.59 2.00 - 8.60 10*3/mm3    Lymphocytes, Absolute 1.97 0.60 - 4.20 10*3/mm3    Monocytes, Absolute 0.60  0.00 - 0.90 10*3/mm3    Eosinophils, Absolute 0.38 0.00 - 0.70 10*3/mm3    Basophils, Absolute 0.05 0.00 - 0.20 10*3/mm3    Immature Grans, Absolute 0.04 (H) 0.00 - 0.02 10*3/mm3   ]    Imaging Results (last 24 hours)     Procedure Component Value Units Date/Time    XR Chest 2 View [[id number]] Collected:  03/05/17 1104     Updated:  03/05/17 1107    Narrative:       Patient Name:  MR. SARITA SMITH  Patient ID:  [id number]   Ordering:  ROSANGELA RAMEY  Attending:  ROSANGELA RAMEY  Referring:  ROSANGELA RAMEY  ------------------------------------------------  Chest PA and lateral  CLINICAL INDICATION: Shortness of breath.    COMPARISON: Chest October 9, 2015.    FINDINGS: Midline sternal sutures from prior cardiac surgery.  Interval development of bilateral basilar infiltrative changes  and small pleural effusions. Interval development of prominence  of the pulmonary vasculature. Unfavorable change since prior  exam.      Impression:       CONCLUSION: Interval development of bilateral infiltrative  changes either atelectasis or pneumonitis and small bilateral  pleural effusions. Interval development of prominence of the  pulmonary vasculature. Unfavorable change since prior exam.    Electronically signed by:  Jono Dillon MD  3/5/2017 11:06 AM CST  Workstation: [id number]        Assessment:     Acute renal failure  Chronic kidney disease, stage 5  Shortness of breath   Fluid overload, edema  Possible lung infiltrates  Metabolic acidosis  CAD, CABG, Atrial fibrillation  Coagulopathy, INR high  Renal stones, obstr uropathy in past.     Plan:     Stage V chronic disease:  Renal functions are stable, creatinine marginally better at 5.3.  GFR 10.  Admitted with volume overload, worsened renal functions.  Has uremic signs and symptoms, low GFR.  Symptomatically, slight improvement.  Patient now has decided to try dialysis.  We will plan a PermCath placement.  Today he has no vomiting, shortness of breath, and appetite  is improved.  No emergent indication to start dialysis today.  We will discuss with vascular regarding PermCath placement, hopefully early next week and initiated dialysis.    I have reviewed the placement of a dialysis catheter, complications including bleeding, infection, cardiac complications with dialysis, with patient and his family in detail.    Possible lung infiltrates: On IV antibiotics.    Volume overload, edema: This is improving.  On IV Lasix twice a day.  We may be able to cut back on diuretics by tomorrow.    History of renal stones, previous urology evaluation.  Ultrasound did not show any obstruction or hydronephrosis.    Discussed with patient and family detail.      Ezio Sweeney MD

## 2017-03-11 NOTE — PLAN OF CARE
Problem: Patient Care Overview (Adult)  Goal: Plan of Care Review  Outcome: Ongoing (interventions implemented as appropriate)    03/10/17 1843   Coping/Psychosocial Response Interventions   Plan Of Care Reviewed With patient   Patient Care Overview   Progress no change       Goal: Adult Individualization and Mutuality  Outcome: Ongoing (interventions implemented as appropriate)  Goal: Discharge Needs Assessment  Outcome: Ongoing (interventions implemented as appropriate)    Problem: Fluid Volume Excess (Adult,Obstetrics,Pediatric)  Goal: Stable Weight  Outcome: Ongoing (interventions implemented as appropriate)  Goal: Balanced Intake/Output  Outcome: Ongoing (interventions implemented as appropriate)    Problem: Pain, Acute (Adult)  Goal: Acceptable Pain Control/Comfort Level  Outcome: Ongoing (interventions implemented as appropriate)    Problem: Renal Failure/Kidney Injury, Acute (Adult)  Goal: Signs and Symptoms of Listed Potential Problems Will be Absent or Manageable (Renal Failure/Kidney Injury, Acute)  Outcome: Ongoing (interventions implemented as appropriate)    Problem: Fall Risk (Adult)  Goal: Absence of Falls  Outcome: Ongoing (interventions implemented as appropriate)

## 2017-03-11 NOTE — PLAN OF CARE
Problem: Fluid Volume Excess (Adult,Obstetrics,Pediatric)  Goal: Balanced Intake/Output  Outcome: Ongoing (interventions implemented as appropriate)    Problem: Pain, Acute (Adult)  Goal: Acceptable Pain Control/Comfort Level  Outcome: Ongoing (interventions implemented as appropriate)    Problem: Renal Failure/Kidney Injury, Acute (Adult)  Goal: Signs and Symptoms of Listed Potential Problems Will be Absent or Manageable (Renal Failure/Kidney Injury, Acute)  Outcome: Ongoing (interventions implemented as appropriate)    Problem: Fall Risk (Adult)  Goal: Absence of Falls  Outcome: Ongoing (interventions implemented as appropriate)

## 2017-03-11 NOTE — PROGRESS NOTES
Nephrology Progress Note:    Feels better.  No SOA.   Edema improving.  Good output.  On IV Lasix.   Creat stable.  GFR 10.   Planned PC placement on Monday.        Patient Active Problem List   Diagnosis   • Pulmonary embolism   • Long-term (current) use of anticoagulants   • Pneumonia of both lungs due to infectious organism   • CKD (chronic kidney disease) stage 5, GFR less than 15 ml/min   • Volume overload   • PAF (paroxysmal atrial fibrillation)       Medications:    amLODIPine 10 mg Oral Nightly   carvedilol 6.25 mg Oral BID With Meals   ceftriaxone 1 g Intravenous Q24H   furosemide 40 mg Intravenous BID   heparin (porcine) 5,000 Units Subcutaneous Q8H   isosorbide mononitrate 30 mg Oral Daily   polyethylene glycol 17 g Oral Daily   ranolazine 500 mg Oral BID        Vitals:    03/11/17 0338 03/11/17 0735 03/11/17 0820 03/11/17 0859   BP: 120/80  130/80 146/72   BP Location: Right arm  Right arm Other (Comment)   Patient Position: Lying  Sitting Sitting   Pulse: 92 (!) 122 97 81   Resp: 18  18 18   Temp: 97.2 °F (36.2 °C)  98.6 °F (37 °C) 97.9 °F (36.6 °C)   TempSrc: Oral  Temporal Artery  Temporal Artery    SpO2: 90%  90% 94%   Weight: 159 lb 2 oz (72.2 kg)      Height:         I/O last 3 completed shifts:  In: 880 [P.O.:780; IV Piggyback:100]  Out: -   I/O this shift:  In: 340 [P.O.:240; IV Piggyback:100]  Out: -     On examination:  General:   Thin built, comfortable.  No distress.  Alert and oriented.   Family at bedside  HEENT:  Pallor present, no icterus.    Chest:  Diminished breath sounds, no rales or wheeze  CVS:   Irregular heart sounds, no pericardial rub or gallop   Abdomen: soft, distended, non tender, normal bowel sounds.    Extremities:  Trace  dewayne edema.   Arm edema, improving.   No calf pain   Neuro:  Grade 4 power both upper and lower limbs.   Mentation:  Alert and oriented.      Past medical illness, social history, medications, previous notes reviewed.       Laboratory results:      Recent  Results (from the past 24 hour(s))   Comprehensive Metabolic Panel    Collection Time: 03/11/17  6:25 AM   Result Value Ref Range    Glucose 86 60 - 100 mg/dL    BUN 46 (H) 7 - 21 mg/dL    Creatinine 5.29 (H) 0.70 - 1.30 mg/dL    Sodium 139 137 - 145 mmol/L    Potassium 3.3 (L) 3.5 - 5.1 mmol/L    Chloride 101 95 - 110 mmol/L    CO2 23.0 22.0 - 31.0 mmol/L    Calcium 8.7 8.4 - 10.2 mg/dL    Total Protein 6.8 6.3 - 8.6 g/dL    Albumin 3.90 3.40 - 4.80 g/dL    ALT (SGPT) 31 21 - 72 U/L    AST (SGOT) 24 17 - 59 U/L    Alkaline Phosphatase 66 38 - 126 U/L    Total Bilirubin 0.9 0.2 - 1.3 mg/dL    eGFR Non African Amer 10 (L) >60 mL/min/1.73    Globulin 2.9 2.3 - 3.5 gm/dL    A/G Ratio 1.3 1.1 - 1.8 g/dL    BUN/Creatinine Ratio 8.7 7.0 - 25.0    Anion Gap 15.0 5.0 - 15.0 mmol/L   Protime-INR    Collection Time: 03/11/17  6:25 AM   Result Value Ref Range    Protime 22.4 (H) 11.1 - 15.3 Seconds    INR 2.02 (H) 0.80 - 1.20   CBC Auto Differential    Collection Time: 03/11/17  6:25 AM   Result Value Ref Range    WBC 6.29 3.20 - 9.80 10*3/mm3    RBC 3.84 (L) 4.37 - 5.74 10*6/mm3    Hemoglobin 10.9 (L) 13.7 - 17.3 g/dL    Hematocrit 32.8 (L) 39.0 - 49.0 %    MCV 85.4 80.0 - 98.0 fL    MCH 28.4 26.5 - 34.0 pg    MCHC 33.2 31.5 - 36.3 g/dL    RDW 15.8 (H) 11.5 - 14.5 %    RDW-SD 49.0 (H) 35.1 - 43.9 fl    MPV 12.3 (H) 8.0 - 12.0 fL    Platelets 187 150 - 450 10*3/mm3    Neutrophil % 55.2 37.0 - 80.0 %    Lymphocyte % 25.8 10.0 - 50.0 %    Monocyte % 12.7 (H) 0.0 - 12.0 %    Eosinophil % 5.2 0.0 - 7.0 %    Basophil % 0.6 0.0 - 2.0 %    Immature Grans % 0.5 0.0 - 0.5 %    Neutrophils, Absolute 3.47 2.00 - 8.60 10*3/mm3    Lymphocytes, Absolute 1.62 0.60 - 4.20 10*3/mm3    Monocytes, Absolute 0.80 0.00 - 0.90 10*3/mm3    Eosinophils, Absolute 0.33 0.00 - 0.70 10*3/mm3    Basophils, Absolute 0.04 0.00 - 0.20 10*3/mm3    Immature Grans, Absolute 0.03 (H) 0.00 - 0.02 10*3/mm3   ]    Imaging Results (last 24 hours)     Procedure  Component Value Units Date/Time    XR Chest 2 View [[id number]] Collected:  03/05/17 1104     Updated:  03/05/17 1107    Narrative:       Patient Name:  MR. SARITA SMITH  Patient ID:  [id number]   Ordering:  ROSANGELA RAMEY  Attending:  ROSANGELA RAMEY  Referring:  ROSANGELA RAMEY  ------------------------------------------------  Chest PA and lateral  CLINICAL INDICATION: Shortness of breath.    COMPARISON: Chest October 9, 2015.    FINDINGS: Midline sternal sutures from prior cardiac surgery.  Interval development of bilateral basilar infiltrative changes  and small pleural effusions. Interval development of prominence  of the pulmonary vasculature. Unfavorable change since prior  exam.      Impression:       CONCLUSION: Interval development of bilateral infiltrative  changes either atelectasis or pneumonitis and small bilateral  pleural effusions. Interval development of prominence of the  pulmonary vasculature. Unfavorable change since prior exam.    Electronically signed by:  Jono Dillon MD  3/5/2017 11:06 AM CST  Workstation: [id number]        Assessment:     Acute renal failure  Chronic kidney disease, stage 5  Shortness of breath   Fluid overload, edema  Possible lung infiltrates  Metabolic acidosis  CAD, CABG, Atrial fibrillation  Coagulopathy, INR high  Renal stones, obstr uropathy in past.     Plan:     Stage V chronic disease:  Creat stable.  GFR still low.   K low and will give small dose, since on IV Lasix.   No pericardial rub or gallop.    Planned permcath placement Monday    Possible lung infiltrates: On IV antibiotics.  Breathing status improved.       Volume overload, edema: This is improving.  On IV Lasix twice a day.      History of renal stones, previous urology evaluation.  Ultrasound did not show any obstruction or hydronephrosis.    Discussed with patient and family detail.      Ezio Sweeney MD

## 2017-03-11 NOTE — PROGRESS NOTES
[facility] Medicine Services  INPATIENT PROGRESS NOTE    Length of Stay: 6  Date of Admission: 3/5/2017  Primary Care Physician: Dago Lopez MD    Subjective   Chief Complaint: No new complaints  HPI:  83 year old male admitted with dyspnea, worsening over the last 3 weeks. Symptoms continued to get worse, with increased shortness of breath and increased lower extremity edema. He also reports abdominal edema as well as upper genitalia edema. He has gained weight several pounds in the past few weeks.    He was admitted and treated for volume overload from advanced renal disease and pneumonia.  His dyspnea has improved.  He has elected for dialysis. Renal arranging for access.    Review of Systems   Constitutional: Negative for chills and fever.   Respiratory: Negative for cough, shortness of breath and wheezing.    Cardiovascular: Negative for chest pain and leg swelling.   Gastrointestinal: Negative for abdominal pain, diarrhea, nausea and vomiting.   Neurological: Negative for headaches.      All pertinent negatives and positives are as above. All other systems have been reviewed and are negative unless otherwise stated.     Objective    Temp:  [96.4 °F (35.8 °C)-98.6 °F (37 °C)] 97.9 °F (36.6 °C)  Heart Rate:  [] 81  Resp:  [18] 18  BP: (120-146)/(72-87) 146/72    Physical Exam   Constitutional: He is oriented to person, place, and time. He appears well-developed and well-nourished. No distress.   HENT:   Head: Normocephalic and atraumatic.   Mouth/Throat: Oropharynx is clear and moist.   Eyes: Conjunctivae and EOM are normal.   Neck: Normal range of motion. Neck supple.   Cardiovascular: Normal rate, regular rhythm and intact distal pulses.  Exam reveals no gallop and no friction rub.    No murmur heard.  Pulmonary/Chest: Effort normal and breath sounds normal. No respiratory distress. He has no wheezes. He has no rales. He exhibits no tenderness.    Abdominal: Soft. Bowel sounds are normal. He exhibits no distension. There is no tenderness.   Musculoskeletal: Normal range of motion. He exhibits no edema or deformity.   Neurological: He is alert and oriented to person, place, and time.   Skin: Skin is warm and dry. He is not diaphoretic. No erythema.   Vitals reviewed.      Results Review:  I have reviewed the labs, radiology results, and diagnostic studies.    Laboratory Data:     Results from last 7 days  Lab Units 03/11/17  0625 03/10/17  0549 03/09/17  0554   SODIUM mmol/L 139 142 139   POTASSIUM mmol/L 3.3* 3.5 3.5   CHLORIDE mmol/L 101 99 101   TOTAL CO2 mmol/L 23.0 24.0 21.0*   BUN mg/dL 46* 49* 50*   CREATININE mg/dL 5.29* 5.36* 5.63*   GLUCOSE mg/dL 86 92 93   CALCIUM mg/dL 8.7 9.1 8.9   BILIRUBIN mg/dL 0.9 1.0 0.9   ALK PHOS U/L 66 78 75   ALT (SGPT) U/L 31 27 31   AST (SGOT) U/L 24 28 22   ANION GAP mmol/L 15.0 19.0* 17.0*     Estimated Creatinine Clearance: 10.8 mL/min (by C-G formula based on Cr of 5.29).    Results from last 7 days  Lab Units 03/10/17  0549 03/08/17  0535 03/07/17  0610   PHOSPHORUS mg/dL 4.6* 4.8* 4.5*           Results from last 7 days  Lab Units 03/11/17  0625 03/10/17  0549 03/09/17  0554 03/08/17  0535 03/07/17  0610   WBC 10*3/mm3 6.29 6.63 6.57 5.46 5.14   HEMOGLOBIN g/dL 10.9* 12.4* 11.4* 11.1* 10.4*   HEMATOCRIT % 32.8* 36.9* 34.6* 33.4* 31.5*   PLATELETS 10*3/mm3 187 223 213 201 191       Results from last 7 days  Lab Units 03/11/17  0625 03/10/17  0549 03/09/17  0554 03/08/17  0535 03/07/17  0610   INR  2.02* 1.93* 2.14* 2.90* 4.28*       Culture Data:   No results found for: BLOODCX  No results found for: URINECX  No results found for: RESPCX  No results found for: WOUNDCX  No results found for: STOOLCX  No components found for: BODYFLD    Radiology Data:   Imaging Results (last 24 hours)     ** No results found for the last 24 hours. **          I have reviewed the patient current medications.     Assessment/Plan      Hospital Problem List     * (Principal)Pneumonia of both lungs due to infectious organism    Long-term (current) use of anticoagulants    CKD (chronic kidney disease) stage 5, GFR less than 15 ml/min    Volume overload    PAF (paroxysmal atrial fibrillation)          Plan:   Rocephin  Azithromycin complete  IV lasix  Renal following  Discussed with Dr. Singh the patients desire to pursue HD, he is to discuss with vascular for access.      Discharge Planning: I expect patient to be discharged to home when HD arranged.     Artem Dillard, APRN   03/11/17   11:00 AM

## 2017-03-12 NOTE — PLAN OF CARE
Problem: Patient Care Overview (Adult)  Goal: Plan of Care Review  Outcome: Ongoing (interventions implemented as appropriate)    03/11/17 1827   Coping/Psychosocial Response Interventions   Plan Of Care Reviewed With patient   Patient Care Overview   Progress no change       Goal: Adult Individualization and Mutuality  Outcome: Ongoing (interventions implemented as appropriate)  Goal: Discharge Needs Assessment  Outcome: Ongoing (interventions implemented as appropriate)    Problem: Fluid Volume Excess (Adult,Obstetrics,Pediatric)  Goal: Stable Weight  Outcome: Ongoing (interventions implemented as appropriate)  Goal: Balanced Intake/Output  Outcome: Ongoing (interventions implemented as appropriate)    Problem: Pain, Acute (Adult)  Goal: Acceptable Pain Control/Comfort Level  Outcome: Ongoing (interventions implemented as appropriate)    Problem: Renal Failure/Kidney Injury, Acute (Adult)  Goal: Signs and Symptoms of Listed Potential Problems Will be Absent or Manageable (Renal Failure/Kidney Injury, Acute)  Outcome: Ongoing (interventions implemented as appropriate)    Problem: Fall Risk (Adult)  Goal: Absence of Falls  Outcome: Ongoing (interventions implemented as appropriate)

## 2017-03-12 NOTE — PLAN OF CARE
Problem: Fluid Volume Excess (Adult,Obstetrics,Pediatric)  Goal: Stable Weight  Outcome: Ongoing (interventions implemented as appropriate)  Goal: Balanced Intake/Output  Outcome: Ongoing (interventions implemented as appropriate)    Problem: Pain, Acute (Adult)  Goal: Acceptable Pain Control/Comfort Level  Outcome: Ongoing (interventions implemented as appropriate)    Problem: Renal Failure/Kidney Injury, Acute (Adult)  Goal: Signs and Symptoms of Listed Potential Problems Will be Absent or Manageable (Renal Failure/Kidney Injury, Acute)  Outcome: Ongoing (interventions implemented as appropriate)    Problem: Fall Risk (Adult)  Goal: Absence of Falls  Outcome: Ongoing (interventions implemented as appropriate)

## 2017-03-12 NOTE — PROGRESS NOTES
Nephrology Progress Note:    No complaints.  Edema is improved.  Good output.  Heart rate was high earlier today.  Patient gives history of episodic atrial fibrillation with rapid rate.  He has been followed by cardiology in the past.  Denies abdominal pain or discomfort.  Planned PermCath placement and initiation of dialysis, hopefully tomorrow.      Patient Active Problem List   Diagnosis   • Pulmonary embolism   • Long-term (current) use of anticoagulants   • Pneumonia of both lungs due to infectious organism   • CKD (chronic kidney disease) stage 5, GFR less than 15 ml/min   • Volume overload   • PAF (paroxysmal atrial fibrillation)       Medications:    amLODIPine 10 mg Oral Nightly   carvedilol 6.25 mg Oral BID With Meals   ceftriaxone 1 g Intravenous Q24H   furosemide 40 mg Intravenous BID   heparin (porcine) 5,000 Units Subcutaneous Q8H   isosorbide mononitrate 30 mg Oral Daily   polyethylene glycol 17 g Oral Daily   ranolazine 500 mg Oral BID   sennosides-docusate sodium 2 tablet Oral Nightly        Vitals:    03/11/17 2300 03/12/17 0334 03/12/17 0732 03/12/17 0759   BP:  133/88  160/92   BP Location:  Left arm  Right arm   Patient Position:  Lying  Lying   Pulse: 118 113 120 (!) 121   Resp:  18  18   Temp:  98.7 °F (37.1 °C)  97 °F (36.1 °C)   TempSrc:  Temporal Artery   Oral   SpO2:  92%  95%   Weight:  156 lb 1 oz (70.8 kg)     Height:         I/O last 3 completed shifts:  In: 340 [P.O.:240; IV Piggyback:100]  Out: 400 [Urine:400]  I/O this shift:  In: 240 [P.O.:240]  Out: 400 [Urine:400]    On examination:  General:   Thin built, comfortable.  No distress.  Alert and oriented.   Family at bedside  HEENT:  Pallor present, no icterus.    Chest:  Diminished breath sounds, no rales or wheeze  CVS:   Irregular heart sounds, no pericardial rub or gallop   Abdomen: soft, distended, non tender, normal bowel sounds.    Extremities:  Trace  dewayne edema.   Arm edema, improving.   No calf pain   Neuro:  Grade 4 power  both upper and lower limbs.   Mentation:  Alert and oriented.      Past medical illness, social history, medications, previous notes reviewed.       Laboratory results:      Recent Results (from the past 24 hour(s))   Comprehensive Metabolic Panel    Collection Time: 03/12/17  5:24 AM   Result Value Ref Range    Glucose 92 60 - 100 mg/dL    BUN 49 (H) 7 - 21 mg/dL    Creatinine 5.23 (H) 0.70 - 1.30 mg/dL    Sodium 141 137 - 145 mmol/L    Potassium 4.0 3.5 - 5.1 mmol/L    Chloride 102 95 - 110 mmol/L    CO2 24.0 22.0 - 31.0 mmol/L    Calcium 9.2 8.4 - 10.2 mg/dL    Total Protein 6.7 6.3 - 8.6 g/dL    Albumin 4.00 3.40 - 4.80 g/dL    ALT (SGPT) 36 21 - 72 U/L    AST (SGOT) 28 17 - 59 U/L    Alkaline Phosphatase 69 38 - 126 U/L    Total Bilirubin 0.8 0.2 - 1.3 mg/dL    eGFR Non  Amer 11 (L) 42 - 98 mL/min/1.73    Globulin 2.7 2.3 - 3.5 gm/dL    A/G Ratio 1.5 1.1 - 1.8 g/dL    BUN/Creatinine Ratio 9.4 7.0 - 25.0    Anion Gap 15.0 5.0 - 15.0 mmol/L   Protime-INR    Collection Time: 03/12/17  5:24 AM   Result Value Ref Range    Protime 21.0 (H) 11.1 - 15.3 Seconds    INR 1.85 (H) 0.80 - 1.20   CBC Auto Differential    Collection Time: 03/12/17  5:24 AM   Result Value Ref Range    WBC 5.61 3.20 - 9.80 10*3/mm3    RBC 3.92 (L) 4.37 - 5.74 10*6/mm3    Hemoglobin 11.2 (L) 13.7 - 17.3 g/dL    Hematocrit 33.6 (L) 39.0 - 49.0 %    MCV 85.7 80.0 - 98.0 fL    MCH 28.6 26.5 - 34.0 pg    MCHC 33.3 31.5 - 36.3 g/dL    RDW 15.8 (H) 11.5 - 14.5 %    RDW-SD 48.8 (H) 35.1 - 43.9 fl    MPV 12.0 8.0 - 12.0 fL    Platelets 174 150 - 450 10*3/mm3    Neutrophil % 52.0 37.0 - 80.0 %    Lymphocyte % 28.2 10.0 - 50.0 %    Monocyte % 13.4 (H) 0.0 - 12.0 %    Eosinophil % 5.0 0.0 - 7.0 %    Basophil % 0.7 0.0 - 2.0 %    Immature Grans % 0.7 (H) 0.0 - 0.5 %    Neutrophils, Absolute 2.92 2.00 - 8.60 10*3/mm3    Lymphocytes, Absolute 1.58 0.60 - 4.20 10*3/mm3    Monocytes, Absolute 0.75 0.00 - 0.90 10*3/mm3    Eosinophils, Absolute 0.28 0.00 -  0.70 10*3/mm3    Basophils, Absolute 0.04 0.00 - 0.20 10*3/mm3    Immature Grans, Absolute 0.04 (H) 0.00 - 0.02 10*3/mm3   ]    Imaging Results (last 24 hours)     Procedure Component Value Units Date/Time    XR Chest 2 View [[id number]] Collected:  03/05/17 1104     Updated:  03/05/17 1107    Narrative:       Patient Name:  MR. SARITA SMITH  Patient ID:  [id number]   Ordering:  ROSANGELA RAMEY  Attending:  ROSANGELA RAMEY  Referring:  ROSANGELA RAMEY  ------------------------------------------------  Chest PA and lateral  CLINICAL INDICATION: Shortness of breath.    COMPARISON: Chest October 9, 2015.    FINDINGS: Midline sternal sutures from prior cardiac surgery.  Interval development of bilateral basilar infiltrative changes  and small pleural effusions. Interval development of prominence  of the pulmonary vasculature. Unfavorable change since prior  exam.      Impression:       CONCLUSION: Interval development of bilateral infiltrative  changes either atelectasis or pneumonitis and small bilateral  pleural effusions. Interval development of prominence of the  pulmonary vasculature. Unfavorable change since prior exam.    Electronically signed by:  Jono Dillon MD  3/5/2017 11:06 AM CST  Workstation: [id number]        Assessment:     Acute renal failure  Chronic kidney disease, stage 5  Shortness of breath   Fluid overload, edema  Possible lung infiltrates  Metabolic acidosis  CAD, CABG, Atrial fibrillation  Coagulopathy, INR high  Renal stones, obstr uropathy in past.     Plan:     Stage V chronic disease:  Creat stable.  GFR still low.   Potassium was low, has been replaced.  His GFR is low, admitted with volume overload, uremic symptoms.  Patient is planned for PermCath placement and initiation of dialysis.  I had discussed with vascular surgery on Friday.  We will keep him nothing by mouth for possible PermCath placement tomorrow.    ESRD: Planned initiation of dialysis.  We will discuss with social  worker regarding arrangements for outpatient dialysis.  We will get hepatitis panel checked.    Possible lung infiltrates: On IV antibiotics.  Breathing status improved.       Volume overload, edema: This is improving.  On IV Lasix twice a day.  Cut back to Lasix once daily.    Atrial fibrillation, heart rate was 121 earlier today.  Increase Coreg to 6.25 mg by mouth twice a day.  Blood pressure is stable.  If blood pressure low, cut back on amlodipine.    History of renal stones, previous urology evaluation.  Ultrasound did not show any obstruction or hydronephrosis.    Discussed with patient and family detail.      Ezio Sweeney MD

## 2017-03-12 NOTE — PROGRESS NOTES
[facility] Medicine Services  INPATIENT PROGRESS NOTE    Length of Stay: 7  Date of Admission: 3/5/2017  Primary Care Physician: Dago Lopez MD    Subjective   Chief Complaint: Constipation  HPI:  83 year old male admitted with dyspnea, worsening over the last 3 weeks. Symptoms continued to get worse, with increased shortness of breath and increased lower extremity edema. He also reports abdominal edema as well as upper genitalia edema. He has gained weight several pounds in the past few weeks.    He was admitted and treated for volume overload from advanced renal disease and pneumonia.  His dyspnea has improved.  He has elected for dialysis. Renal arranging for access.    Complains of constipation today.     Review of Systems   Constitutional: Negative for chills and fever.   Respiratory: Negative for cough, shortness of breath and wheezing.    Cardiovascular: Negative for chest pain and leg swelling.   Gastrointestinal: Negative for abdominal pain, diarrhea, nausea and vomiting.   Neurological: Negative for headaches.      All pertinent negatives and positives are as above. All other systems have been reviewed and are negative unless otherwise stated.     Objective    Temp:  [97 °F (36.1 °C)-98.8 °F (37.1 °C)] 97 °F (36.1 °C)  Heart Rate:  [105-121] 121  Resp:  [18-19] 18  BP: (133-160)/(77-92) 160/92    Physical Exam   Constitutional: He is oriented to person, place, and time. He appears well-developed and well-nourished. No distress.   HENT:   Head: Normocephalic and atraumatic.   Mouth/Throat: Oropharynx is clear and moist.   Eyes: Conjunctivae and EOM are normal.   Neck: Normal range of motion. Neck supple.   Cardiovascular: Normal rate, regular rhythm and intact distal pulses.  Exam reveals no gallop and no friction rub.    No murmur heard.  Pulmonary/Chest: Effort normal and breath sounds normal. No respiratory distress. He has no wheezes. He has no rales.  He exhibits no tenderness.   Abdominal: Soft. Bowel sounds are normal. He exhibits no distension. There is no tenderness.   Musculoskeletal: Normal range of motion. He exhibits no edema or deformity.   Neurological: He is alert and oriented to person, place, and time.   Skin: Skin is warm and dry. He is not diaphoretic. No erythema.   Vitals reviewed.      Results Review:  I have reviewed the labs, radiology results, and diagnostic studies.    Laboratory Data:     Results from last 7 days  Lab Units 03/12/17  0524 03/11/17  0625 03/10/17  0549   SODIUM mmol/L 141 139 142   POTASSIUM mmol/L 4.0 3.3* 3.5   CHLORIDE mmol/L 102 101 99   TOTAL CO2 mmol/L 24.0 23.0 24.0   BUN mg/dL 49* 46* 49*   CREATININE mg/dL 5.23* 5.29* 5.36*   GLUCOSE mg/dL 92 86 92   CALCIUM mg/dL 9.2 8.7 9.1   BILIRUBIN mg/dL 0.8 0.9 1.0   ALK PHOS U/L 69 66 78   ALT (SGPT) U/L 36 31 27   AST (SGOT) U/L 28 24 28   ANION GAP mmol/L 15.0 15.0 19.0*     Estimated Creatinine Clearance: 10.7 mL/min (by C-G formula based on Cr of 5.23).    Results from last 7 days  Lab Units 03/10/17  0549 03/08/17  0535 03/07/17  0610   PHOSPHORUS mg/dL 4.6* 4.8* 4.5*           Results from last 7 days  Lab Units 03/12/17  0524 03/11/17  0625 03/10/17  0549 03/09/17  0554 03/08/17  0535   WBC 10*3/mm3 5.61 6.29 6.63 6.57 5.46   HEMOGLOBIN g/dL 11.2* 10.9* 12.4* 11.4* 11.1*   HEMATOCRIT % 33.6* 32.8* 36.9* 34.6* 33.4*   PLATELETS 10*3/mm3 174 187 223 213 201       Results from last 7 days  Lab Units 03/12/17  0524 03/11/17  0625 03/10/17  0549 03/09/17  0554 03/08/17  0535   INR  1.85* 2.02* 1.93* 2.14* 2.90*       Culture Data:   No results found for: BLOODCX  No results found for: URINECX  No results found for: RESPCX  No results found for: WOUNDCX  No results found for: STOOLCX  No components found for: BODYFLD    Radiology Data:   Imaging Results (last 24 hours)     ** No results found for the last 24 hours. **          I have reviewed the patient current  medications.     Assessment/Plan     Hospital Problem List     * (Principal)Pneumonia of both lungs due to infectious organism    Long-term (current) use of anticoagulants    CKD (chronic kidney disease) stage 5, GFR less than 15 ml/min    Volume overload    PAF (paroxysmal atrial fibrillation)          Plan:   Rocephin  Azithromycin complete  IV lasix  Renal following  Permcath placement monday    Discharge Planning: I expect patient to be discharged to home when HD arranged.     Artem Dillard, APRN   03/12/17   1:43 PM

## 2017-03-13 NOTE — PROGRESS NOTES
Discharge Planning Assessment  [facility]     Patient Name: Ronkslillie Middleton  MRN: [id number]  Today's Date: 3/13/2017    Admit Date: 3/5/2017          Discharge Needs Assessment     None            Discharge Plan       03/13/17 1131    Case Management/Social Work Plan    Additional Comments [facility] attempted to arrange outpatient hemodialysis for patient. Patient has not had his first hemodialysis treatment as he does not have temporary access. Plan is to  control HR today and plan for perm access tomorrow. [facility]/CM will initiate outpatient hemodialysis pending first hemodialysis treatment has been performed and hep panel results are available. Clinicals are required for outpatient hemodialysis and chair time will not be arranged until all required documents are available. CM will initiate referral for outpatient hemodialysis tomorrow. ......Jazmine WEIR        Discharge Placement     No information found        Expected Discharge Date and Time     Expected Discharge Date Expected Discharge Time    Mar 14, 2017               Demographic Summary     None            Functional Status     None            Psychosocial     None            Abuse/Neglect     None            Legal     None            Substance Abuse     None            Patient Forms     None          CARMELLA Ricketts

## 2017-03-13 NOTE — PROGRESS NOTES
[facility] DAILY NOTE        LOS: 8 days   Pre procedure day 1  For tunnel catheter placement in am by Dr Vilma Esparza request by Dr Holguin to place long term tunneled dialysis catheter    Chief Complaint: Need dialysis access.  Pt requesting Dr Esparza place access and then place AV fistula.    Mild SOA     Subjective       VAS 0    ROS:    Review of Systems   Constitution: Negative for chills, fever and night sweats.   HENT: Negative for headaches, hoarse voice and nosebleeds.    Eyes: Negative for visual disturbance.   Cardiovascular: Positive for dyspnea on exertion and irregular heartbeat. Negative for chest pain and leg swelling.   Respiratory: Positive for cough and shortness of breath. Negative for hemoptysis.    Hematologic/Lymphatic: Does not bruise/bleed easily.   Skin: Positive for dry skin. Negative for color change and rash.   Musculoskeletal: Negative for joint pain, joint swelling and muscle weakness.   Gastrointestinal: Negative for abdominal pain and change in bowel habit.   Genitourinary: Negative for frequency.   Neurological: Negative for brief paralysis, focal weakness, light-headedness and numbness.   Psychiatric/Behavioral: Negative for altered mental status.       Objective     Vital Signs  Temp:  [96.9 °F (36.1 °C)-98.9 °F (37.2 °C)] 98.9 °F (37.2 °C)  Heart Rate:  [] 133  Resp:  [18] 18  BP: (133-163)/(86-91) 133/91  Body mass index is 20.04 kg/(m^2).    Intake/Output Summary (Last 24 hours) at 03/13/17 1424  Last data filed at 03/13/17 1416   Gross per 24 hour   Intake    350 ml   Output      0 ml   Net    350 ml     I/O this shift:  In: 350 [P.O.:350]  Out: -     Wt Readings from Last 3 Encounters:   03/12/17 156 lb 1 oz (70.8 kg)           Physical Exam   Physical Exam   Constitutional: He is oriented to person, place, and time. He appears well-nourished.   HENT:   Head: Normocephalic.   Mouth/Throat: Oropharynx is clear and moist.   Eyes: Conjunctivae are normal.   Neck:  No JVD present. No tracheal deviation present.   Cardiovascular: Normal heart sounds and intact distal pulses.  An irregular rhythm present.   Pulses:       Radial pulses are 2+ on the right side, and 2+ on the left side.        Dorsalis pedis pulses are 1+ on the right side, and 1+ on the left side.        Posterior tibial pulses are 2+ on the right side, and 2+ on the left side.   Pulmonary/Chest: Effort normal. He has rales (sc in bases ).   Abdominal: Soft. Bowel sounds are normal.   Musculoskeletal: He exhibits no edema or tenderness.   Neurological: He is alert and oriented to person, place, and time.   Skin: Skin is warm and dry. No erythema. No pallor.   Psychiatric: Judgment normal.   Nursing note and vitals reviewed.        Results Review:      Imaging Results (last 24 hours)     ** No results found for the last 24 hours. **          Lab Results (last 24 hours)     Procedure Component Value Units Date/Time    CBC & Differential [[id number]] Collected:  03/13/17 0529    Specimen:  Blood Updated:  03/13/17 0619    Narrative:       The following orders were created for panel order CBC & Differential.  Procedure                               Abnormality         Status                     ---------                               -----------         ------                     CBC Auto Differential[[id number]]         Abnormal            Final result                 Please view results for these tests on the individual orders.    CBC Auto Differential [[id number]]  (Abnormal) Collected:  03/13/17 0529    Specimen:  Blood Updated:  03/13/17 0619     WBC 5.81 10*3/mm3      RBC 3.83 (L) 10*6/mm3      Hemoglobin 10.9 (L) g/dL      Hematocrit 33.0 (L) %      MCV 86.2 fL      MCH 28.5 pg      MCHC 33.0 g/dL      RDW 15.8 (H) %      RDW-SD 50.0 (H) fl      MPV 11.8 fL      Platelets 166 10*3/mm3      Neutrophil % 55.6 %      Lymphocyte % 27.4 %      Monocyte % 10.8 %      Eosinophil % 5.0 %      Basophil % 0.7 %      Immature  Grans % 0.5 %      Neutrophils, Absolute 3.23 10*3/mm3      Lymphocytes, Absolute 1.59 10*3/mm3      Monocytes, Absolute 0.63 10*3/mm3      Eosinophils, Absolute 0.29 10*3/mm3      Basophils, Absolute 0.04 10*3/mm3      Immature Grans, Absolute 0.03 (H) 10*3/mm3     Protime-INR [[id number]]  (Abnormal) Collected:  03/13/17 0529    Specimen:  Blood Updated:  03/13/17 0636     Protime 17.4 (H) Seconds      INR 1.44 (H)     Narrative:       Therapeutic range for most indications is 2.0-3.0 INR,  or 2.5-3.5 for mechanical heart valves.    Comprehensive Metabolic Panel [[id number]]  (Abnormal) Collected:  03/13/17 0529    Specimen:  Blood Updated:  03/13/17 0639     Glucose 87 mg/dL      BUN 46 (H) mg/dL      Creatinine 4.94 (H) mg/dL      Sodium 142 mmol/L      Potassium 3.7 mmol/L      Chloride 103 mmol/L      CO2 22.0 mmol/L      Calcium 9.0 mg/dL      Total Protein 6.7 g/dL      Albumin 3.90 g/dL      ALT (SGPT) 36 U/L      AST (SGOT) 29 U/L      Alkaline Phosphatase 65 U/L      Total Bilirubin 0.8 mg/dL      eGFR Non African Amer 11 (L) mL/min/1.73      Globulin 2.8 gm/dL      A/G Ratio 1.4 g/dL      BUN/Creatinine Ratio 9.3      Anion Gap 17.0 (H) mmol/L     Narrative:       The MDRD GFR formula is only valid for adults with stable renal function between ages 18 and 70.    Hepatitis B Core Antibody, IgM [[id number]] Collected:  03/13/17 1246    Specimen:  Blood Updated:  03/13/17 1310    Hepatitis B Surface Antibody [[id number]] Collected:  03/13/17 1246    Specimen:  Blood Updated:  03/13/17 1310    Hepatitis B Surface Antigen [[id number]] Collected:  03/13/17 1246    Specimen:  Blood Updated:  03/13/17 1310                            PT/INR:    PROTIME   Date Value Ref Range Status   03/13/2017 17.4 (H) 11.1 - 15.3 Seconds Final   03/12/2017 21.0 (H) 11.1 - 15.3 Seconds Final   03/11/2017 22.4 (H) 11.1 - 15.3 Seconds Final   /  INR   Date Value Ref Range Status   03/13/2017 1.44 (H) 0.80 - 1.20 Final   03/12/2017 1.85 (H)  0.80 - 1.20 Final   03/11/2017 2.02 (H) 0.80 - 1.20 Final               amLODIPine 5 mg Oral Nightly   carvedilol 12.5 mg Oral BID With Meals   ceftriaxone 1 g Intravenous Q24H   diltiaZEM 60 mg Oral Q6H   furosemide 40 mg Intravenous Daily   heparin (porcine) 5,000 Units Subcutaneous Q8H   isosorbide mononitrate 30 mg Oral Daily   polyethylene glycol 17 g Oral Daily   ranolazine 500 mg Oral BID   sennosides-docusate sodium 2 tablet Oral Nightly            Patient Active Problem List   Diagnosis Code   • Pulmonary embolism I26.99   • Long-term (current) use of anticoagulants Z79.01   • Pneumonia of both lungs due to infectious organism J18.9   • CKD (chronic kidney disease) stage 5, GFR less than 15 ml/min N18.5   • Volume overload E87.70   • PAF (paroxysmal atrial fibrillation) I48.0       Assessment and Plan      CKD Stage 4:  Long term access needed for hemodialysis.   Dr Esparza has been requested to place.  Detailed discussion regarding risks involved,potentia for complications and hoped for benefits of tunneled dialysis catheter.   Patient understands, agrees, and wishes to proceed with plan.   Plan placement in am in radiology.  NPO at midnight except for medications.  Hold heparin injections 3/14/17 until after procedure.  Case request and orders completed.               This document has been electronically signed by JEFFREY Blackburn on March 13, 2017 2:24 PM

## 2017-03-13 NOTE — PROGRESS NOTES
[facility] Medicine Services  INPATIENT PROGRESS NOTE    Length of Stay: 8  Date of Admission: 3/5/2017  Primary Care Physician: Dago Lopez MD    Subjective   Chief Complaint: Constipation  HPI:  83 year old male admitted with dyspnea, worsening over the last 3 weeks. Symptoms continued to get worse, with increased shortness of breath and increased lower extremity edema. He also reports abdominal edema as well as upper genitalia edema. He has gained weight several pounds in the past few weeks.    He was admitted and treated for volume overload from advanced renal disease and pneumonia.  His dyspnea has improved.  He has elected for dialysis. Renal arranging for access.    Tachycardic this am.    Review of Systems   Constitutional: Negative for chills and fever.   Respiratory: Negative for cough, shortness of breath and wheezing.    Cardiovascular: Negative for chest pain and leg swelling.   Gastrointestinal: Negative for abdominal pain, diarrhea, nausea and vomiting.   Neurological: Negative for headaches.      All pertinent negatives and positives are as above. All other systems have been reviewed and are negative unless otherwise stated.     Objective    Temp:  [96.9 °F (36.1 °C)-98.9 °F (37.2 °C)] 98.9 °F (37.2 °C)  Heart Rate:  [] 133  Resp:  [18] 18  BP: (133-163)/(86-91) 133/91    Physical Exam   Constitutional: He is oriented to person, place, and time. He appears well-developed and well-nourished. No distress.   HENT:   Head: Normocephalic and atraumatic.   Mouth/Throat: Oropharynx is clear and moist.   Eyes: Conjunctivae and EOM are normal.   Neck: Normal range of motion. Neck supple.   Cardiovascular: Intact distal pulses.  An irregularly irregular rhythm present. Tachycardia present.  Exam reveals no gallop and no friction rub.    No murmur heard.  Pulmonary/Chest: Effort normal and breath sounds normal. No respiratory distress. He has no wheezes.  He has no rales. He exhibits no tenderness.   Abdominal: Soft. Bowel sounds are normal. He exhibits no distension. There is no tenderness.   Musculoskeletal: Normal range of motion. He exhibits no edema or deformity.   Neurological: He is alert and oriented to person, place, and time.   Skin: Skin is warm and dry. He is not diaphoretic. No erythema.   Vitals reviewed.      Results Review:  I have reviewed the labs, radiology results, and diagnostic studies.    Laboratory Data:     Results from last 7 days  Lab Units 03/13/17  0529 03/12/17  0524 03/11/17  0625   SODIUM mmol/L 142 141 139   POTASSIUM mmol/L 3.7 4.0 3.3*   CHLORIDE mmol/L 103 102 101   TOTAL CO2 mmol/L 22.0 24.0 23.0   BUN mg/dL 46* 49* 46*   CREATININE mg/dL 4.94* 5.23* 5.29*   GLUCOSE mg/dL 87 92 86   CALCIUM mg/dL 9.0 9.2 8.7   BILIRUBIN mg/dL 0.8 0.8 0.9   ALK PHOS U/L 65 69 66   ALT (SGPT) U/L 36 36 31   AST (SGOT) U/L 29 28 24   ANION GAP mmol/L 17.0* 15.0 15.0     Estimated Creatinine Clearance: 11.3 mL/min (by C-G formula based on Cr of 4.94).    Results from last 7 days  Lab Units 03/10/17  0549 03/08/17  0535 03/07/17  0610   PHOSPHORUS mg/dL 4.6* 4.8* 4.5*           Results from last 7 days  Lab Units 03/13/17  0529 03/12/17  0524 03/11/17  0625 03/10/17  0549 03/09/17  0554   WBC 10*3/mm3 5.81 5.61 6.29 6.63 6.57   HEMOGLOBIN g/dL 10.9* 11.2* 10.9* 12.4* 11.4*   HEMATOCRIT % 33.0* 33.6* 32.8* 36.9* 34.6*   PLATELETS 10*3/mm3 166 174 187 223 213       Results from last 7 days  Lab Units 03/13/17  0529 03/12/17  0524 03/11/17  0625 03/10/17  0549 03/09/17  0554   INR  1.44* 1.85* 2.02* 1.93* 2.14*       Culture Data:   No results found for: BLOODCX  No results found for: URINECX  No results found for: RESPCX  No results found for: WOUNDCX  No results found for: STOOLCX  No components found for: BODYFLD    Radiology Data:   Imaging Results (last 24 hours)     ** No results found for the last 24 hours. **          I have reviewed the patient  current medications.     Assessment/Plan     Hospital Problem List     * (Principal)Pneumonia of both lungs due to infectious organism    Long-term (current) use of anticoagulants    CKD (chronic kidney disease) stage 5, GFR less than 15 ml/min    Volume overload    PAF (paroxysmal atrial fibrillation)          Plan:   Rocephin  IV lasix  Renal following, SCr stable  Permcath placement tomorrow  Cardizem added for rate control    Discharge Planning: I expect patient to be discharged to home when HD arranged.     Artem Dillard, APRN   03/13/17   10:50 AM

## 2017-03-13 NOTE — PROGRESS NOTES
Nephrology Progress Note:    He is feeling well.  Now on Lasix once daily.  Urine output is fair.  No shortness of breath.  Vascular surgery plans PermCath placement tomorrow.  Some improvement in renal functions, GFR still low.  Heart rate was 130 this morning.  The dose of Coreg was increased yesterday.      Patient Active Problem List   Diagnosis   • Pulmonary embolism   • Long-term (current) use of anticoagulants   • Pneumonia of both lungs due to infectious organism   • CKD (chronic kidney disease) stage 5, GFR less than 15 ml/min   • Volume overload   • PAF (paroxysmal atrial fibrillation)       Medications:    amLODIPine 10 mg Oral Nightly   carvedilol 6.25 mg Oral BID With Meals   ceftriaxone 1 g Intravenous Q24H   diltiaZEM 60 mg Oral Q6H   furosemide 40 mg Intravenous Daily   heparin (porcine) 5,000 Units Subcutaneous Q8H   isosorbide mononitrate 30 mg Oral Daily   polyethylene glycol 17 g Oral Daily   ranolazine 500 mg Oral BID   sennosides-docusate sodium 2 tablet Oral Nightly        Vitals:    03/13/17 0000 03/13/17 0214 03/13/17 0729 03/13/17 0749   BP:    133/91   BP Location:    Right arm   Patient Position:    Sitting   Pulse: 105 (!) 153 81 (!) 133   Resp:    18   Temp:    98.9 °F (37.2 °C)   TempSrc:    Temporal Artery    SpO2:    94%   Weight:       Height:         I/O last 3 completed shifts:  In: 240 [P.O.:240]  Out: 800 [Urine:800]       On examination:  General:   Thin built, comfortable.  No distress.  Alert and oriented.   Family at bedside  HEENT:  Pallor present, no icterus.    Chest:  Diminished breath sounds, no rales or wheeze  CVS:   Irregular heart sounds, no pericardial rub or gallop   Abdomen: soft, distended, non tender, normal bowel sounds.    Extremities:  Trace  dewayne edema.   Arm edema, improving.   No calf pain   Neuro:  Grade 4 power both upper and lower limbs.   Mentation:  Alert and oriented.      Past medical illness, social history, medications, previous notes reviewed.        Laboratory results:      Recent Results (from the past 24 hour(s))   Comprehensive Metabolic Panel    Collection Time: 03/13/17  5:29 AM   Result Value Ref Range    Glucose 87 60 - 100 mg/dL    BUN 46 (H) 7 - 21 mg/dL    Creatinine 4.94 (H) 0.70 - 1.30 mg/dL    Sodium 142 137 - 145 mmol/L    Potassium 3.7 3.5 - 5.1 mmol/L    Chloride 103 95 - 110 mmol/L    CO2 22.0 22.0 - 31.0 mmol/L    Calcium 9.0 8.4 - 10.2 mg/dL    Total Protein 6.7 6.3 - 8.6 g/dL    Albumin 3.90 3.40 - 4.80 g/dL    ALT (SGPT) 36 21 - 72 U/L    AST (SGOT) 29 17 - 59 U/L    Alkaline Phosphatase 65 38 - 126 U/L    Total Bilirubin 0.8 0.2 - 1.3 mg/dL    eGFR Non African Amer 11 (L) >60 mL/min/1.73    Globulin 2.8 2.3 - 3.5 gm/dL    A/G Ratio 1.4 1.1 - 1.8 g/dL    BUN/Creatinine Ratio 9.3 7.0 - 25.0    Anion Gap 17.0 (H) 5.0 - 15.0 mmol/L   Protime-INR    Collection Time: 03/13/17  5:29 AM   Result Value Ref Range    Protime 17.4 (H) 11.1 - 15.3 Seconds    INR 1.44 (H) 0.80 - 1.20   CBC Auto Differential    Collection Time: 03/13/17  5:29 AM   Result Value Ref Range    WBC 5.81 3.20 - 9.80 10*3/mm3    RBC 3.83 (L) 4.37 - 5.74 10*6/mm3    Hemoglobin 10.9 (L) 13.7 - 17.3 g/dL    Hematocrit 33.0 (L) 39.0 - 49.0 %    MCV 86.2 80.0 - 98.0 fL    MCH 28.5 26.5 - 34.0 pg    MCHC 33.0 31.5 - 36.3 g/dL    RDW 15.8 (H) 11.5 - 14.5 %    RDW-SD 50.0 (H) 35.1 - 43.9 fl    MPV 11.8 8.0 - 12.0 fL    Platelets 166 150 - 450 10*3/mm3    Neutrophil % 55.6 37.0 - 80.0 %    Lymphocyte % 27.4 10.0 - 50.0 %    Monocyte % 10.8 0.0 - 12.0 %    Eosinophil % 5.0 0.0 - 7.0 %    Basophil % 0.7 0.0 - 2.0 %    Immature Grans % 0.5 0.0 - 0.5 %    Neutrophils, Absolute 3.23 2.00 - 8.60 10*3/mm3    Lymphocytes, Absolute 1.59 0.60 - 4.20 10*3/mm3    Monocytes, Absolute 0.63 0.00 - 0.90 10*3/mm3    Eosinophils, Absolute 0.29 0.00 - 0.70 10*3/mm3    Basophils, Absolute 0.04 0.00 - 0.20 10*3/mm3    Immature Grans, Absolute 0.03 (H) 0.00 - 0.02 10*3/mm3   ]    Imaging Results (last 24  hours)     Procedure Component Value Units Date/Time    XR Chest 2 View [[id number]] Collected:  03/05/17 1104     Updated:  03/05/17 1107    Narrative:       Patient Name:  MR. SARITA SMITH  Patient ID:  [id number]   Ordering:  ROSANGELA RAMEY  Attending:  ROSANGELA RAMEY  Referring:  ROSANGELA RAMEY  ------------------------------------------------  Chest PA and lateral  CLINICAL INDICATION: Shortness of breath.    COMPARISON: Chest October 9, 2015.    FINDINGS: Midline sternal sutures from prior cardiac surgery.  Interval development of bilateral basilar infiltrative changes  and small pleural effusions. Interval development of prominence  of the pulmonary vasculature. Unfavorable change since prior  exam.      Impression:       CONCLUSION: Interval development of bilateral infiltrative  changes either atelectasis or pneumonitis and small bilateral  pleural effusions. Interval development of prominence of the  pulmonary vasculature. Unfavorable change since prior exam.    Electronically signed by:  Jono Dillon MD  3/5/2017 11:06 AM CST  Workstation: [id number]        Assessment:   Discussed with patient and family detail.      Ezio Sweeney MD    Acute renal failure  Chronic kidney disease, stage 5  Shortness of breath   Fluid overload, edema  Possible lung infiltrates  Metabolic acidosis  CAD, CABG, Atrial fibrillation  Coagulopathy, INR high  Renal stones, obstr uropathy in past.     Plan:     Stage V chronic disease, ESRD:  Recent acute kidney injury, worsened renal functions.  Serum creatinine is slightly better.  His potassium and bicarbonate levels are stable.  GFR is still low.  Has 1 functioning kidney, but significant atrophy.  Patient is planned for PermCath placement and initiation of dialysis.    Atrial fibrillation with intermittent fast heart rate.  He is on carvedilol.  Anticoagulation is on hold for PermCath placement tomorrow.  Increase Coreg to 12.5 mg by mouth twice a day.  If heart  rate continues to be high, consult cardiology    Congestive heart failure, volume overload, acute respiratory failure: Now on Lasix once daily, intravenously.  Mood status is improved.    Essential hypertension: Currently on amlodipine, carvedilol, isosorbide.    Coronary artery disease, history of congestive heart failure    Discussed with patient in detail.  I have discussed with vascular surgery.  Planned PermCath placement tomorrow.  We will check hepatitis panel.  Once catheter is in place, will request  to arrange for outpatient dialysis as well.

## 2017-03-13 NOTE — DISCHARGE PLACEMENT REQUEST
"Sarita Middleton (83 y.o. Male)     Date of Birth Social Security Number Address Home Phone MRN    12/27/1933  [address]  [place] [address] [phone] [id number]    [place] Marital Status          [employer]        Admission Date Admission Type Admitting Provider Attending Provider Department, Room/Bed    3/5/17 Emergency Stas Van MD Williams, Kevin L, MD [address], 406/1    Discharge Date Discharge Disposition Discharge Destination                      Attending Provider: Stas Van MD     Allergies:  No Known Allergies    Isolation:  None   Infection:  None   Code Status:  FULL    Ht:  74\" (188 cm)   Wt:  156 lb 1 oz (70.8 kg)    Admission Cmt:  None   Principal Problem:  Pneumonia of both lungs due to infectious organism [J18.9]                 Active Insurance as of 3/5/2017     Primary Coverage     Payor Plan Insurance Group Employer/Plan Group    HUMANA MEDICARE REPL HUMANA MEDICARE REPL [id number]     Payor Plan Address Payor Plan Phone Number Effective From Effective To    [address] [address] [phone] 1/1/2016     [place] [address]       Subscriber Name Subscriber Birth Date Member ID       SARITA MIDDLETON 12/27/1933 [id number]                 Emergency Contacts      (Rel.) Home Phone Work Phone Mobile Phone    Madai Middleton (Spouse) [phone] -- --            Lines, Drains & Airways    Active LDAs     Name:   Placement date:   Placement time:   Site:   Days:    Peripheral IV Line - Single Lumen 03/08/17 1720 cephalic vein (lateral side of arm), left 22 gauge  03/08/17    1720      4                Hospital Medications (active)       Dose Frequency Start End    acetaminophen-codeine (TYLENOL #3) 300-30 MG per tablet 1 tablet 1 tablet Every 6 Hours PRN 3/5/2017     Sig - Route: Take 1 tablet by mouth Every 6 (Six) Hours As Needed for moderate pain (4-6). - Oral    amLODIPine (NORVASC) tablet 5 mg 5 mg Nightly " 3/13/2017     Sig - Route: Take 1 tablet by mouth Every Night. - Oral    bisacodyl (DULCOLAX) EC tablet 10 mg 10 mg Daily PRN 3/12/2017     Sig - Route: Take 2 tablets by mouth Daily As Needed for Constipation. - Oral    carvedilol (COREG) tablet 12.5 mg 12.5 mg 2 Times Daily With Meals 3/13/2017     Sig - Route: Take 1 tablet by mouth 2 (Two) Times a Day With Meals. - Oral    cefTRIAXone (ROCEPHIN) 1 g/100 mL 0.9% NS (MBP) 1 g Every 24 Hours 3/6/2017     Sig - Route: Infuse 100 mL into a venous catheter Daily. - Intravenous    diltiaZEM (CARDIZEM) tablet 60 mg 60 mg Every 6 Hours Scheduled 3/13/2017     Sig - Route: Take 1 tablet by mouth Every 6 (Six) Hours. - Oral    furosemide (LASIX) injection 40 mg 40 mg Daily 3/13/2017     Sig - Route: Infuse 4 mL into a venous catheter Daily. - Intravenous    heparin (porcine) 5000 UNIT/ML injection 5,000 Units 5,000 Units Every 8 Hours Scheduled 3/10/2017     Sig - Route: Inject 1 mL under the skin Every 8 (Eight) Hours. - Subcutaneous    isosorbide mononitrate (IMDUR) 24 hr tablet 30 mg 30 mg Daily 3/5/2017     Sig - Route: Take 1 tablet by mouth Daily. - Oral    polyethylene glycol (MIRALAX) powder 17 g 17 g Daily 3/9/2017     Sig - Route: Take 17 g by mouth Daily. - Oral    ranolazine (RANEXA) 12 hr tablet 500 mg 500 mg 2 Times Daily 3/5/2017     Sig - Route: Take 1 tablet by mouth 2 (Two) Times a Day. - Oral    sennosides-docusate sodium (SENOKOT-S) 8.6-50 MG tablet 2 tablet 2 tablet Nightly 3/12/2017     Sig - Route: Take 2 tablets by mouth Every Night. - Oral    sodium chloride 0.9 % flush 10 mL 10 mL As Needed 3/5/2017     Sig - Route: Infuse 10 mL into a venous catheter As Needed for line care. - Intravenous    amLODIPine (NORVASC) tablet 10 mg (Discontinued) 10 mg Nightly 3/5/2017 3/13/2017    Sig - Route: Take 1 tablet by mouth Every Night. - Oral    carvedilol (COREG) tablet 6.25 mg (Discontinued) 6.25 mg 2 Times Daily With Meals 3/5/2017 3/13/2017    Sig -  Route: Take 1 tablet by mouth 2 (Two) Times a Day With Meals. - Oral    furosemide (LASIX) injection 40 mg (Discontinued) 40 mg 2 Times Daily 3/5/2017 3/12/2017    Sig - Route: Infuse 4 mL into a venous catheter 2 (Two) Times a Day. - Intravenous          Lab Results (all)     Procedure Component Value Units Date/Time    CBC & Differential [[id number]] Collected:  03/05/17 1051    Specimen:  Blood Updated:  03/05/17 1100    Narrative:       The following orders were created for panel order CBC & Differential.  Procedure                               Abnormality         Status                     ---------                               -----------         ------                     CBC Auto Differential[[id number]]         Abnormal            Final result                 Please view results for these tests on the individual orders.    CBC Auto Differential [[id number]]  (Abnormal) Collected:  03/05/17 1051    Specimen:  Blood Updated:  03/05/17 1100     WBC 6.14 10*3/mm3      RBC 3.64 (L) 10*6/mm3      Hemoglobin 10.4 (L) g/dL      Hematocrit 31.4 (L) %      MCV 86.3 fL      MCH 28.6 pg      MCHC 33.1 g/dL      RDW 15.2 (H) %      RDW-SD 48.1 (H) fl      MPV 11.5 fL      Platelets 212 10*3/mm3      Neutrophil % 70.9 %      Lymphocyte % 14.8 %      Monocyte % 11.6 %      Eosinophil % 2.0 %      Basophil % 0.5 %      Immature Grans % 0.2 %      Neutrophils, Absolute 4.36 10*3/mm3      Lymphocytes, Absolute 0.91 10*3/mm3      Monocytes, Absolute 0.71 10*3/mm3      Eosinophils, Absolute 0.12 10*3/mm3      Basophils, Absolute 0.03 10*3/mm3      Immature Grans, Absolute 0.01 10*3/mm3     Lipase [[id number]]  (Normal) Collected:  03/05/17 1051    Specimen:  Blood Updated:  03/05/17 1112     Lipase 79 U/L     CK [[id number]]  (Normal) Collected:  03/05/17 1051    Specimen:  Blood Updated:  03/05/17 1112     Creatine Kinase 167 U/L     Comprehensive Metabolic Panel [[id number]]  (Abnormal) Collected:  03/05/17 1051    Specimen:  Blood  Updated:  03/05/17 1114     Glucose 105 (H) mg/dL      BUN 57 (H) mg/dL      Creatinine 6.15 (H) mg/dL      Sodium 136 (L) mmol/L      Potassium 4.7 mmol/L      Chloride 104 mmol/L      CO2 18.0 (L) mmol/L      Calcium 9.0 mg/dL      Total Protein 7.0 g/dL      Albumin 4.30 g/dL      ALT (SGPT) 40 U/L      AST (SGOT) 35 U/L      Alkaline Phosphatase 74 U/L      Total Bilirubin 1.1 mg/dL      eGFR Non African Amer 9 (L) mL/min/1.73      Globulin 2.7 gm/dL      A/G Ratio 1.6 g/dL      BUN/Creatinine Ratio 9.3      Anion Gap 14.0 mmol/L     Narrative:       The MDRD GFR formula is only valid for adults with stable renal function between ages 18 and 70.    Troponin [[id number]]  (Normal) Collected:  03/05/17 1051    Specimen:  Blood Updated:  03/05/17 1124     Troponin I <0.012 ng/mL     BNP [[id number]]  (Abnormal) Collected:  03/05/17 1051    Specimen:  Blood Updated:  03/05/17 1124     proBNP 79425.0 (H) pg/mL     CK-MB [[id number]]  (Normal) Collected:  03/05/17 1051    Specimen:  Blood Updated:  03/05/17 1124     CKMB 2.56 ng/mL     Protime-INR [[id number]]  (Abnormal) Collected:  03/05/17 1051    Specimen:  Blood Updated:  03/05/17 1134     Protime 52.6 (H) Seconds      INR 6.29 (C)       INR 6.18 on repeat testing       Narrative:       Therapeutic range for most indications is 2.0-3.0 INR,  or 2.5-3.5 for mechanical heart valves.    G[facility]ner Draw [[id number]] Collected:  03/05/17 1051    Specimen:  Blood Updated:  03/05/17 1501    Narrative:       The following orders were created for panel order [facility] Draw.  Procedure                               Abnormality         Status                     ---------                               -----------         ------                     Light Blue Top[[id number]]                                    Final result               Green Top (Gel)[[id number]]                                   Final result               Lavender Top[[id number]]                                      Final  result               Gold Top - SST[[id number]]                                    Final result                 Please view results for these tests on the individual orders.    Light Blue Top [[id number]] Collected:  03/05/17 1051    Specimen:  Blood Updated:  03/05/17 1501     Extra Tube hold for add-on       Auto resulted       Green Top (Gel) [[id number]] Collected:  03/05/17 1051    Specimen:  Blood Updated:  03/05/17 1501     Extra Tube Hold for add-ons.       Auto resulted.       Lavender Top [[id number]] Collected:  03/05/17 1051    Specimen:  Blood Updated:  03/05/17 1501     Extra Tube hold for add-on       Auto resulted       Gold Top - SST [[id number]] Collected:  03/05/17 1051    Specimen:  Blood Updated:  03/05/17 1501     Extra Tube Hold for add-ons.       Auto resulted.       Protime-INR [[id number]]  (Abnormal) Collected:  03/05/17 1630    Specimen:  Blood Updated:  03/05/17 1711     Protime 53.7 (H) Seconds      INR 6.46 (C)     Narrative:       Therapeutic range for most indications is 2.0-3.0 INR,  or 2.5-3.5 for mechanical heart valves.    Urinalysis With / Culture If Indicated [[id number]]  (Abnormal) Collected:  03/05/17 1843    Specimen:  Urine from Urine, Clean Catch Updated:  03/05/17 1908     Color, UA Dark Yellow      Appearance, UA Cloudy (A)      pH, UA 5.5      Specific Gravity, UA 1.016      Glucose, UA Negative      Ketones, UA Negative      Bilirubin, UA Negative      Blood, UA Moderate (2+) (A)      Protein, UA 30 mg/dL (1+) (A)      Leuk Esterase, UA Large (3+) (A)      Nitrite, UA Positive (A)      Urobilinogen, UA 0.2 E.U./dL     Urinalysis, Microscopic Only [[id number]]  (Abnormal) Collected:  03/05/17 1843    Specimen:  Urine from Urine, Clean Catch Updated:  03/05/17 1930     RBC, UA 6-12 (A) /HPF      WBC, UA Too Numerous to Count (A) /HPF      Bacteria, UA 2+ (A) /HPF      Squamous Epithelial Cells, UA 3-5 (A) /HPF      Hyaline Casts, UA None Seen /LPF      Methodology Automated Microscopy      CBC & Differential [[id number]] Collected:  03/06/17 0623    Specimen:  Blood Updated:  03/06/17 0736    Narrative:       The following orders were created for panel order CBC & Differential.  Procedure                               Abnormality         Status                     ---------                               -----------         ------                     CBC Auto Differential[[id number]]         Abnormal            Final result                 Please view results for these tests on the individual orders.    CBC Auto Differential [[id number]]  (Abnormal) Collected:  03/06/17 0623    Specimen:  Blood Updated:  03/06/17 0736     WBC 5.66 10*3/mm3      RBC 3.78 (L) 10*6/mm3      Hemoglobin 10.7 (L) g/dL      Hematocrit 32.5 (L) %      MCV 86.0 fL      MCH 28.3 pg      MCHC 32.9 g/dL      RDW 15.5 (H) %      RDW-SD 47.9 (H) fl      MPV 12.2 (H) fL      Platelets 212 10*3/mm3      Neutrophil % 57.2 %      Lymphocyte % 23.5 %      Monocyte % 13.8 (H) %      Eosinophil % 4.1 %      Basophil % 0.7 %      Immature Grans % 0.7 (H) %      Neutrophils, Absolute 3.24 10*3/mm3      Lymphocytes, Absolute 1.33 10*3/mm3      Monocytes, Absolute 0.78 10*3/mm3      Eosinophils, Absolute 0.23 10*3/mm3      Basophils, Absolute 0.04 10*3/mm3      Immature Grans, Absolute 0.04 (H) 10*3/mm3     Comprehensive Metabolic Panel [[id number]]  (Abnormal) Collected:  03/06/17 0623    Specimen:  Blood Updated:  03/06/17 0746     Glucose 86 mg/dL      BUN 58 (H) mg/dL      Creatinine 6.56 (H) mg/dL      Sodium 138 mmol/L      Potassium 4.0 mmol/L      Chloride 104 mmol/L      CO2 18.0 (L) mmol/L      Calcium 8.9 mg/dL      Total Protein 7.1 g/dL      Albumin 4.40 g/dL      ALT (SGPT) 43 U/L      AST (SGOT) 28 U/L      Alkaline Phosphatase 81 U/L      Total Bilirubin 0.9 mg/dL      eGFR Non African Amer 8 (L) mL/min/1.73      Globulin 2.7 gm/dL      A/G Ratio 1.6 g/dL      BUN/Creatinine Ratio 8.8      Anion Gap 16.0 (H) mmol/L     Narrative:        The MDRD GFR formula is only valid for adults with stable renal function between ages 18 and 70.    CK [[id number]]  (Abnormal) Collected:  03/06/17 0623    Specimen:  Blood Updated:  03/06/17 0746     Creatine Kinase 204 (H) U/L     Protime-INR [[id number]]  (Abnormal) Collected:  03/06/17 0623    Specimen:  Blood Updated:  03/06/17 0755     Protime 49.3 (H) Seconds      INR 5.77 (C)     Narrative:       Therapeutic range for most indications is 2.0-3.0 INR,  or 2.5-3.5 for mechanical heart valves.    Urine Culture [[id number]] Collected:  03/05/17 1843    Specimen:  Urine from Urine, Clean Catch Updated:  03/07/17 0617     Urine Culture Culture in progress       Mixed Culture     Narrative:         Specimen contains mixed organisms of questionable pathogenicity which indicates contamination with commensal vikki.  Further identification is unlikely to provide clinically useful information.  Suggest recollection.    CBC & Differential [[id number]] Collected:  03/07/17 0610    Specimen:  Blood Updated:  03/07/17 0634    Narrative:       The following orders were created for panel order CBC & Differential.  Procedure                               Abnormality         Status                     ---------                               -----------         ------                     CBC Auto Differential[[id number]]         Abnormal            Final result                 Please view results for these tests on the individual orders.    CBC Auto Differential [[id number]]  (Abnormal) Collected:  03/07/17 0610    Specimen:  Blood Updated:  03/07/17 0634     WBC 5.14 10*3/mm3      RBC 3.68 (L) 10*6/mm3      Hemoglobin 10.4 (L) g/dL      Hematocrit 31.5 (L) %      MCV 85.6 fL      MCH 28.3 pg      MCHC 33.0 g/dL      RDW 15.4 (H) %      RDW-SD 47.5 (H) fl      MPV 11.7 fL      Platelets 191 10*3/mm3      Neutrophil % 56.6 %      Lymphocyte % 23.5 %      Monocyte % 14.6 (H) %      Eosinophil % 4.1 %      Basophil % 0.8 %      Immature  Grans % 0.4 %      Neutrophils, Absolute 2.91 10*3/mm3      Lymphocytes, Absolute 1.21 10*3/mm3      Monocytes, Absolute 0.75 10*3/mm3      Eosinophils, Absolute 0.21 10*3/mm3      Basophils, Absolute 0.04 10*3/mm3      Immature Grans, Absolute 0.02 10*3/mm3     Comprehensive Metabolic Panel [[id number]]  (Abnormal) Collected:  03/07/17 0610    Specimen:  Blood Updated:  03/07/17 0649     Glucose 95 mg/dL      BUN 54 (H) mg/dL      Creatinine 6.17 (H) mg/dL      Sodium 138 mmol/L      Potassium 3.7 mmol/L      Chloride 104 mmol/L      CO2 20.0 (L) mmol/L      Calcium 8.7 mg/dL      Total Protein 6.8 g/dL      Albumin 4.00 g/dL      ALT (SGPT) 29 U/L      AST (SGOT) 20 U/L      Alkaline Phosphatase 72 U/L      Total Bilirubin 0.9 mg/dL      eGFR Non African Amer 9 (L) mL/min/1.73      Globulin 2.8 gm/dL      A/G Ratio 1.4 g/dL      BUN/Creatinine Ratio 8.8      Anion Gap 14.0 mmol/L     Narrative:       The MDRD GFR formula is only valid for adults with stable renal function between ages 18 and 70.    Phosphorus [[id number]]  (Abnormal) Collected:  03/07/17 0610    Specimen:  Blood Updated:  03/07/17 0649     Phosphorus 4.5 (H) mg/dL     Protime-INR [[id number]]  (Abnormal) Collected:  03/07/17 0610    Specimen:  Blood Updated:  03/07/17 0713     Protime 39.4 (H) Seconds      INR 4.28 (H)     Narrative:       Therapeutic range for most indications is 2.0-3.0 INR,  or 2.5-3.5 for mechanical heart valves.    CBC & Differential [[id number]] Collected:  03/08/17 0535    Specimen:  Blood Updated:  03/08/17 0602    Narrative:       The following orders were created for panel order CBC & Differential.  Procedure                               Abnormality         Status                     ---------                               -----------         ------                     CBC Auto Differential[[id number]]         Abnormal            Final result                 Please view results for these tests on the individual orders.    CBC  Auto Differential [[id number]]  (Abnormal) Collected:  03/08/17 0535    Specimen:  Blood Updated:  03/08/17 0602     WBC 5.46 10*3/mm3      RBC 3.91 (L) 10*6/mm3      Hemoglobin 11.1 (L) g/dL      Hematocrit 33.4 (L) %      MCV 85.4 fL      MCH 28.4 pg      MCHC 33.2 g/dL      RDW 15.5 (H) %      RDW-SD 47.8 (H) fl      MPV 12.1 (H) fL      Platelets 201 10*3/mm3      Neutrophil % 56.0 %      Lymphocyte % 23.1 %      Monocyte % 15.4 (H) %      Eosinophil % 4.4 %      Basophil % 0.7 %      Immature Grans % 0.4 %      Neutrophils, Absolute 3.06 10*3/mm3      Lymphocytes, Absolute 1.26 10*3/mm3      Monocytes, Absolute 0.84 10*3/mm3      Eosinophils, Absolute 0.24 10*3/mm3      Basophils, Absolute 0.04 10*3/mm3      Immature Grans, Absolute 0.02 10*3/mm3     Comprehensive Metabolic Panel [[id number]]  (Abnormal) Collected:  03/08/17 0535    Specimen:  Blood Updated:  03/08/17 0620     Glucose 100 mg/dL      BUN 54 (H) mg/dL      Creatinine 6.17 (H) mg/dL      Sodium 140 mmol/L      Potassium 3.6 mmol/L      Chloride 103 mmol/L      CO2 21.0 (L) mmol/L      Calcium 8.8 mg/dL      Total Protein 6.9 g/dL      Albumin 4.20 g/dL      ALT (SGPT) 30 U/L      AST (SGOT) 23 U/L      Alkaline Phosphatase 80 U/L      Total Bilirubin 0.9 mg/dL      eGFR Non African Amer 9 (L) mL/min/1.73      Globulin 2.7 gm/dL      A/G Ratio 1.6 g/dL      BUN/Creatinine Ratio 8.8      Anion Gap 16.0 (H) mmol/L     Narrative:       The MDRD GFR formula is only valid for adults with stable renal function between ages 18 and 70.    Phosphorus [[id number]]  (Abnormal) Collected:  03/08/17 0535    Specimen:  Blood Updated:  03/08/17 0620     Phosphorus 4.8 (H) mg/dL     Protime-INR [[id number]]  (Abnormal) Collected:  03/08/17 0535    Specimen:  Blood Updated:  03/08/17 0642     Protime 29.4 (H) Seconds      INR 2.90 (H)     Narrative:       Therapeutic range for most indications is 2.0-3.0 INR,  or 2.5-3.5 for mechanical heart valves.    CBC & Differential  [[id number]] Collected:  03/09/17 0554    Specimen:  Blood Updated:  03/09/17 0620    Narrative:       The following orders were created for panel order CBC & Differential.  Procedure                               Abnormality         Status                     ---------                               -----------         ------                     CBC Auto Differential[[id number]]         Abnormal            Final result                 Please view results for these tests on the individual orders.    CBC Auto Differential [[id number]]  (Abnormal) Collected:  03/09/17 0554    Specimen:  Blood Updated:  03/09/17 0620     WBC 6.57 10*3/mm3      RBC 4.06 (L) 10*6/mm3      Hemoglobin 11.4 (L) g/dL      Hematocrit 34.6 (L) %      MCV 85.2 fL      MCH 28.1 pg      MCHC 32.9 g/dL      RDW 15.6 (H) %      RDW-SD 48.4 (H) fl      MPV 11.9 fL      Platelets 213 10*3/mm3      Neutrophil % 54.9 %      Lymphocyte % 23.3 %      Monocyte % 14.9 (H) %      Eosinophil % 5.3 %      Basophil % 0.8 %      Immature Grans % 0.8 (H) %      Neutrophils, Absolute 3.61 10*3/mm3      Lymphocytes, Absolute 1.53 10*3/mm3      Monocytes, Absolute 0.98 (H) 10*3/mm3      Eosinophils, Absolute 0.35 10*3/mm3      Basophils, Absolute 0.05 10*3/mm3      Immature Grans, Absolute 0.05 (H) 10*3/mm3     Comprehensive Metabolic Panel [[id number]]  (Abnormal) Collected:  03/09/17 0554    Specimen:  Blood Updated:  03/09/17 0643     Glucose 93 mg/dL      BUN 50 (H) mg/dL      Creatinine 5.63 (H) mg/dL      Sodium 139 mmol/L      Potassium 3.5 mmol/L      Chloride 101 mmol/L      CO2 21.0 (L) mmol/L      Calcium 8.9 mg/dL      Total Protein 7.1 g/dL      Albumin 4.20 g/dL      ALT (SGPT) 31 U/L      AST (SGOT) 22 U/L      Alkaline Phosphatase 75 U/L      Total Bilirubin 0.9 mg/dL      eGFR Non African Amer 10 (L) mL/min/1.73      Globulin 2.9 gm/dL      A/G Ratio 1.4 g/dL      BUN/Creatinine Ratio 8.9      Anion Gap 17.0 (H) mmol/L     Narrative:       The MDRD GFR  formula is only valid for adults with stable renal function between ages 18 and 70.    Protime-INR [[id number]]  (Abnormal) Collected:  03/09/17 0554    Specimen:  Blood Updated:  03/09/17 0659     Protime 23.4 (H) Seconds      INR 2.14 (H)     Narrative:       Therapeutic range for most indications is 2.0-3.0 INR,  or 2.5-3.5 for mechanical heart valves.    CBC & Differential [[id number]] Collected:  03/10/17 0549    Specimen:  Blood Updated:  03/10/17 0643    Narrative:       The following orders were created for panel order CBC & Differential.  Procedure                               Abnormality         Status                     ---------                               -----------         ------                     CBC Auto Differential[[id number]]         Abnormal            Final result                 Please view results for these tests on the individual orders.    CBC Auto Differential [[id number]]  (Abnormal) Collected:  03/10/17 0549    Specimen:  Blood Updated:  03/10/17 0643     WBC 6.63 10*3/mm3      RBC 4.32 (L) 10*6/mm3      Hemoglobin 12.4 (L) g/dL      Hematocrit 36.9 (L) %      MCV 85.4 fL      MCH 28.7 pg      MCHC 33.6 g/dL      RDW 15.6 (H) %      RDW-SD 48.5 (H) fl      MPV 11.9 fL      Platelets 223 10*3/mm3      Neutrophil % 54.2 %      Lymphocyte % 29.7 %      Monocyte % 9.0 %      Eosinophil % 5.7 %      Basophil % 0.8 %      Immature Grans % 0.6 (H) %      Neutrophils, Absolute 3.59 10*3/mm3      Lymphocytes, Absolute 1.97 10*3/mm3      Monocytes, Absolute 0.60 10*3/mm3      Eosinophils, Absolute 0.38 10*3/mm3      Basophils, Absolute 0.05 10*3/mm3      Immature Grans, Absolute 0.04 (H) 10*3/mm3     Protime-INR [[id number]]  (Abnormal) Collected:  03/10/17 0549    Specimen:  Blood Updated:  03/10/17 0652     Protime 21.6 (H) Seconds      INR 1.93 (H)     Narrative:       Therapeutic range for most indications is 2.0-3.0 INR,  or 2.5-3.5 for mechanical heart valves.    Comprehensive Metabolic Panel  [[id number]]  (Abnormal) Collected:  03/10/17 0549    Specimen:  Blood Updated:  03/10/17 0655     Glucose 92 mg/dL      BUN 49 (H) mg/dL      Creatinine 5.36 (H) mg/dL      Sodium 142 mmol/L      Potassium 3.5 mmol/L      Chloride 99 mmol/L      CO2 24.0 mmol/L      Calcium 9.1 mg/dL      Total Protein 7.6 g/dL      Albumin 4.40 g/dL      ALT (SGPT) 27 U/L      AST (SGOT) 28 U/L      Alkaline Phosphatase 78 U/L      Total Bilirubin 1.0 mg/dL      eGFR Non African Amer 10 (L) mL/min/1.73      Globulin 3.2 gm/dL      A/G Ratio 1.4 g/dL      BUN/Creatinine Ratio 9.1      Anion Gap 19.0 (H) mmol/L     Narrative:       The MDRD GFR formula is only valid for adults with stable renal function between ages 18 and 70.    Phosphorus [[id number]]  (Abnormal) Collected:  03/10/17 0549    Specimen:  Blood Updated:  03/10/17 0655     Phosphorus 4.6 (H) mg/dL     PTH, Intact [[id number]]  (Abnormal) Collected:  03/10/17 0549    Specimen:  Blood Updated:  03/10/17 0707     PTH, Intact 568.7 (H) pg/mL     Blood Culture [[id number]]  (Normal) Collected:  03/05/17 1631    Specimen:  Blood from Arm, Right Updated:  03/10/17 1701     Blood Culture No growth at 5 days     Blood Culture [[id number]]  (Normal) Collected:  03/05/17 1630    Specimen:  Blood from Hand, Right Updated:  03/10/17 1701     Blood Culture No growth at 5 days     CBC & Differential [[id number]] Collected:  03/11/17 0625    Specimen:  Blood Updated:  03/11/17 0800    Narrative:       The following orders were created for panel order CBC & Differential.  Procedure                               Abnormality         Status                     ---------                               -----------         ------                     CBC Auto Differential[[id number]]         Abnormal            Final result                 Please view results for these tests on the individual orders.    CBC Auto Differential [[id number]]  (Abnormal) Collected:  03/11/17 0625    Specimen:  Blood Updated:   03/11/17 0800     WBC 6.29 10*3/mm3      RBC 3.84 (L) 10*6/mm3      Hemoglobin 10.9 (L) g/dL      Hematocrit 32.8 (L) %      MCV 85.4 fL      MCH 28.4 pg      MCHC 33.2 g/dL      RDW 15.8 (H) %      RDW-SD 49.0 (H) fl      MPV 12.3 (H) fL      Platelets 187 10*3/mm3      Neutrophil % 55.2 %      Lymphocyte % 25.8 %      Monocyte % 12.7 (H) %      Eosinophil % 5.2 %      Basophil % 0.6 %      Immature Grans % 0.5 %      Neutrophils, Absolute 3.47 10*3/mm3      Lymphocytes, Absolute 1.62 10*3/mm3      Monocytes, Absolute 0.80 10*3/mm3      Eosinophils, Absolute 0.33 10*3/mm3      Basophils, Absolute 0.04 10*3/mm3      Immature Grans, Absolute 0.03 (H) 10*3/mm3     Comprehensive Metabolic Panel [[id number]]  (Abnormal) Collected:  03/11/17 0625    Specimen:  Blood Updated:  03/11/17 0808     Glucose 86 mg/dL      BUN 46 (H) mg/dL      Creatinine 5.29 (H) mg/dL      Sodium 139 mmol/L      Potassium 3.3 (L) mmol/L      Chloride 101 mmol/L      CO2 23.0 mmol/L      Calcium 8.7 mg/dL      Total Protein 6.8 g/dL      Albumin 3.90 g/dL      ALT (SGPT) 31 U/L      AST (SGOT) 24 U/L      Alkaline Phosphatase 66 U/L      Total Bilirubin 0.9 mg/dL      eGFR Non African Amer 10 (L) mL/min/1.73      Globulin 2.9 gm/dL      A/G Ratio 1.3 g/dL      BUN/Creatinine Ratio 8.7      Anion Gap 15.0 mmol/L     Narrative:       The MDRD GFR formula is only valid for adults with stable renal function between ages 18 and 70.    Protime-INR [[id number]]  (Abnormal) Collected:  03/11/17 0625    Specimen:  Blood Updated:  03/11/17 0811     Protime 22.4 (H) Seconds      INR 2.02 (H)     Narrative:       Therapeutic range for most indications is 2.0-3.0 INR,  or 2.5-3.5 for mechanical heart valves.    CBC & Differential [[id number]] Collected:  03/12/17 0524    Specimen:  Blood Updated:  03/12/17 0631    Narrative:       The following orders were created for panel order CBC & Differential.  Procedure                               Abnormality          Status                     ---------                               -----------         ------                     CBC Auto Differential[[id number]]         Abnormal            Final result                 Please view results for these tests on the individual orders.    CBC Auto Differential [[id number]]  (Abnormal) Collected:  03/12/17 0524    Specimen:  Blood Updated:  03/12/17 0631     WBC 5.61 10*3/mm3      RBC 3.92 (L) 10*6/mm3      Hemoglobin 11.2 (L) g/dL      Hematocrit 33.6 (L) %      MCV 85.7 fL      MCH 28.6 pg      MCHC 33.3 g/dL      RDW 15.8 (H) %      RDW-SD 48.8 (H) fl      MPV 12.0 fL      Platelets 174 10*3/mm3      Neutrophil % 52.0 %      Lymphocyte % 28.2 %      Monocyte % 13.4 (H) %      Eosinophil % 5.0 %      Basophil % 0.7 %      Immature Grans % 0.7 (H) %      Neutrophils, Absolute 2.92 10*3/mm3      Lymphocytes, Absolute 1.58 10*3/mm3      Monocytes, Absolute 0.75 10*3/mm3      Eosinophils, Absolute 0.28 10*3/mm3      Basophils, Absolute 0.04 10*3/mm3      Immature Grans, Absolute 0.04 (H) 10*3/mm3     Comprehensive Metabolic Panel [[id number]]  (Abnormal) Collected:  03/12/17 0524    Specimen:  Blood Updated:  03/12/17 0648     Glucose 92 mg/dL      BUN 49 (H) mg/dL      Creatinine 5.23 (H) mg/dL      Sodium 141 mmol/L      Potassium 4.0 mmol/L      Chloride 102 mmol/L      CO2 24.0 mmol/L      Calcium 9.2 mg/dL      Total Protein 6.7 g/dL      Albumin 4.00 g/dL      ALT (SGPT) 36 U/L      AST (SGOT) 28 U/L      Alkaline Phosphatase 69 U/L      Total Bilirubin 0.8 mg/dL      eGFR Non African Amer 11 (L) mL/min/1.73      Globulin 2.7 gm/dL      A/G Ratio 1.5 g/dL      BUN/Creatinine Ratio 9.4      Anion Gap 15.0 mmol/L     Narrative:       The MDRD GFR formula is only valid for adults with stable renal function between ages 18 and 70.    Protime-INR [[id number]]  (Abnormal) Collected:  03/12/17 0524    Specimen:  Blood Updated:  03/12/17 0710     Protime 21.0 (H) Seconds      INR 1.85 (H)      Narrative:       Therapeutic range for most indications is 2.0-3.0 INR,  or 2.5-3.5 for mechanical heart valves.    CBC & Differential [[id number]] Collected:  03/13/17 0529    Specimen:  Blood Updated:  03/13/17 0619    Narrative:       The following orders were created for panel order CBC & Differential.  Procedure                               Abnormality         Status                     ---------                               -----------         ------                     CBC Auto Differential[[id number]]         Abnormal            Final result                 Please view results for these tests on the individual orders.    CBC Auto Differential [[id number]]  (Abnormal) Collected:  03/13/17 0529    Specimen:  Blood Updated:  03/13/17 0619     WBC 5.81 10*3/mm3      RBC 3.83 (L) 10*6/mm3      Hemoglobin 10.9 (L) g/dL      Hematocrit 33.0 (L) %      MCV 86.2 fL      MCH 28.5 pg      MCHC 33.0 g/dL      RDW 15.8 (H) %      RDW-SD 50.0 (H) fl      MPV 11.8 fL      Platelets 166 10*3/mm3      Neutrophil % 55.6 %      Lymphocyte % 27.4 %      Monocyte % 10.8 %      Eosinophil % 5.0 %      Basophil % 0.7 %      Immature Grans % 0.5 %      Neutrophils, Absolute 3.23 10*3/mm3      Lymphocytes, Absolute 1.59 10*3/mm3      Monocytes, Absolute 0.63 10*3/mm3      Eosinophils, Absolute 0.29 10*3/mm3      Basophils, Absolute 0.04 10*3/mm3      Immature Grans, Absolute 0.03 (H) 10*3/mm3     Protime-INR [[id number]]  (Abnormal) Collected:  03/13/17 0529    Specimen:  Blood Updated:  03/13/17 0636     Protime 17.4 (H) Seconds      INR 1.44 (H)     Narrative:       Therapeutic range for most indications is 2.0-3.0 INR,  or 2.5-3.5 for mechanical heart valves.    Comprehensive Metabolic Panel [[id number]]  (Abnormal) Collected:  03/13/17 0529    Specimen:  Blood Updated:  03/13/17 0639     Glucose 87 mg/dL      BUN 46 (H) mg/dL      Creatinine 4.94 (H) mg/dL      Sodium 142 mmol/L      Potassium 3.7 mmol/L      Chloride 103 mmol/L       CO2 22.0 mmol/L      Calcium 9.0 mg/dL      Total Protein 6.7 g/dL      Albumin 3.90 g/dL      ALT (SGPT) 36 U/L      AST (SGOT) 29 U/L      Alkaline Phosphatase 65 U/L      Total Bilirubin 0.8 mg/dL      eGFR Non African Amer 11 (L) mL/min/1.73      Globulin 2.8 gm/dL      A/G Ratio 1.4 g/dL      BUN/Creatinine Ratio 9.3      Anion Gap 17.0 (H) mmol/L     Narrative:       The MDRD GFR formula is only valid for adults with stable renal function between ages 18 and 70.          Imaging Results (all)     Procedure Component Value Units Date/Time    XR Chest 2 View [[id number]] Collected:  03/05/17 1104     Updated:  03/05/17 1107    Narrative:       Patient Name:  MR. SARITA SMITH  Patient ID:  [id number]   Ordering:  ROSANGELA RAMEY  Attending:  ROSANGELA RAMEY  Referring:  ROSANGELA RAMEY  ------------------------------------------------  Chest PA and lateral  CLINICAL INDICATION: Shortness of breath.    COMPARISON: Chest October 9, 2015.    FINDINGS: Midline sternal sutures from prior cardiac surgery.  Interval development of bilateral basilar infiltrative changes  and small pleural effusions. Interval development of prominence  of the pulmonary vasculature. Unfavorable change since prior  exam.      Impression:       CONCLUSION: Interval development of bilateral infiltrative  changes either atelectasis or pneumonitis and small bilateral  pleural effusions. Interval development of prominence of the  pulmonary vasculature. Unfavorable change since prior exam.    Electronically signed by:  Jono Dillon MD  3/5/2017 11:06 AM [facility]  Workstation: [id number]     Renal Complete [[id number]] Collected:  03/06/17 1112     Updated:  03/06/17 1117    Narrative:       [facility] [place]    Patient Name: SARITA SMITH    ORDERING: OTTONIEL CERVANTES    ATTENDING: KADIE CONNOR     REFERRING: OTTONIEL CERVANTES    -----------------------      EXAMINATION: Ultrasound, retroperitoneal / renal     CLINICAL  INDICATION / HISTORY: Acute on chronic renal failure.  COMPARISON:  None  TECHNIQUE:  Grayscale and color Doppler ultrasound.    FINDINGS:    Right kidney: Measures 10.6 cm in length. There is cortical  thinning and increased echogenicity of the renal parenchyma  compatible with renal parenchymal disease. There are cysts in the  midpole and lower pole measuring 2.1 cm and 4.6 cm respectively.  No hydronephrosis is seen.    Left kidney: No renal tissue is seen.    Urinary bladder: Unremarkable    There is a left pleural effusion.         Impression:       CONCLUSION:   1.  No left renal tissue is seen.  2.  Echogenic right kidney with cortical thinning compatible with  renal parenchymal disease.  3.  Right renal cysts.  4.  No right hydronephrosis.  5.  Left pleural effusion.    Electronically signed by:  Edwin Corrales MD  3/6/2017 11:16 AM [facility]  Workstation: [id number]          ECG/EMG Results (all)     Procedure Component Value Units Date/Time    SCANNED EKG [[id number]] Resulted:  03/05/17      Updated:  03/06/17 1435    ECG 12 Lead [[id number]] Collected:  03/05/17 1018     Updated:  03/07/17 0841    Narrative:       Test Reason : SOBBlood Pressure : **/** mmHGVent. Rate : 098 BPM     Atrial Rate : 117 BPM   P-R Int : 000 ms          QRS Dur : 092 ms    QT Int : 366 ms       P-R-T Axes : 000 041 138 degrees   QTc Int : 467 msAtrial fibrillationNonspecific ST and T   wave abnormality , probably digitalis effectProlonged QTAbnormal ECGWhen compared with ECG of 09-OCT-2015 19:23,Atrial fibrillation has replaced Sinus rhythmNonspecific T wave abnormality now evident in Inferior leadsNonspecific T wave abnormality, worse   in Lateral leadsConfirmed by ANGELITO DE LA ROSA MD ([id number]),  SHUN TREVIÑO ([id number]) on3/7/2017 8:41:42 AMReferred By:             Confirmed By:ANGELITO DE LA ROSA MD    SCANNED EKG [[id number]] Resulted:  03/05/17      Updated:  03/07/17 1414

## 2017-03-14 NOTE — INTERVAL H&P NOTE
H&P reviewed. The patient was examined and there are no changes to the H&P.  Plan place tunnel catheter for dialysis  Risks benefits discussed understands agrees with plan.

## 2017-03-14 NOTE — H&P (VIEW-ONLY)
[facility] DAILY NOTE        LOS: 8 days   Pre procedure day 1  For tunnel catheter placement in am by Dr Vilma Esparza request by Dr Holguin to place long term tunneled dialysis catheter    Chief Complaint: Need dialysis access.  Pt requesting Dr Esparza place access and then place AV fistula.    Mild SOA     Subjective       VAS 0    ROS:    Review of Systems   Constitution: Negative for chills, fever and night sweats.   HENT: Negative for headaches, hoarse voice and nosebleeds.    Eyes: Negative for visual disturbance.   Cardiovascular: Positive for dyspnea on exertion and irregular heartbeat. Negative for chest pain and leg swelling.   Respiratory: Positive for cough and shortness of breath. Negative for hemoptysis.    Hematologic/Lymphatic: Does not bruise/bleed easily.   Skin: Positive for dry skin. Negative for color change and rash.   Musculoskeletal: Negative for joint pain, joint swelling and muscle weakness.   Gastrointestinal: Negative for abdominal pain and change in bowel habit.   Genitourinary: Negative for frequency.   Neurological: Negative for brief paralysis, focal weakness, light-headedness and numbness.   Psychiatric/Behavioral: Negative for altered mental status.       Objective     Vital Signs  Temp:  [96.9 °F (36.1 °C)-98.9 °F (37.2 °C)] 98.9 °F (37.2 °C)  Heart Rate:  [] 133  Resp:  [18] 18  BP: (133-163)/(86-91) 133/91  Body mass index is 20.04 kg/(m^2).    Intake/Output Summary (Last 24 hours) at 03/13/17 1424  Last data filed at 03/13/17 1416   Gross per 24 hour   Intake    350 ml   Output      0 ml   Net    350 ml     I/O this shift:  In: 350 [P.O.:350]  Out: -     Wt Readings from Last 3 Encounters:   03/12/17 156 lb 1 oz (70.8 kg)           Physical Exam   Physical Exam   Constitutional: He is oriented to person, place, and time. He appears well-nourished.   HENT:   Head: Normocephalic.   Mouth/Throat: Oropharynx is clear and moist.   Eyes: Conjunctivae are normal.   Neck:  No JVD present. No tracheal deviation present.   Cardiovascular: Normal heart sounds and intact distal pulses.  An irregular rhythm present.   Pulses:       Radial pulses are 2+ on the right side, and 2+ on the left side.        Dorsalis pedis pulses are 1+ on the right side, and 1+ on the left side.        Posterior tibial pulses are 2+ on the right side, and 2+ on the left side.   Pulmonary/Chest: Effort normal. He has rales (sc in bases ).   Abdominal: Soft. Bowel sounds are normal.   Musculoskeletal: He exhibits no edema or tenderness.   Neurological: He is alert and oriented to person, place, and time.   Skin: Skin is warm and dry. No erythema. No pallor.   Psychiatric: Judgment normal.   Nursing note and vitals reviewed.        Results Review:      Imaging Results (last 24 hours)     ** No results found for the last 24 hours. **          Lab Results (last 24 hours)     Procedure Component Value Units Date/Time    CBC & Differential [[id number]] Collected:  03/13/17 0529    Specimen:  Blood Updated:  03/13/17 0619    Narrative:       The following orders were created for panel order CBC & Differential.  Procedure                               Abnormality         Status                     ---------                               -----------         ------                     CBC Auto Differential[[id number]]         Abnormal            Final result                 Please view results for these tests on the individual orders.    CBC Auto Differential [[id number]]  (Abnormal) Collected:  03/13/17 0529    Specimen:  Blood Updated:  03/13/17 0619     WBC 5.81 10*3/mm3      RBC 3.83 (L) 10*6/mm3      Hemoglobin 10.9 (L) g/dL      Hematocrit 33.0 (L) %      MCV 86.2 fL      MCH 28.5 pg      MCHC 33.0 g/dL      RDW 15.8 (H) %      RDW-SD 50.0 (H) fl      MPV 11.8 fL      Platelets 166 10*3/mm3      Neutrophil % 55.6 %      Lymphocyte % 27.4 %      Monocyte % 10.8 %      Eosinophil % 5.0 %      Basophil % 0.7 %      Immature  Grans % 0.5 %      Neutrophils, Absolute 3.23 10*3/mm3      Lymphocytes, Absolute 1.59 10*3/mm3      Monocytes, Absolute 0.63 10*3/mm3      Eosinophils, Absolute 0.29 10*3/mm3      Basophils, Absolute 0.04 10*3/mm3      Immature Grans, Absolute 0.03 (H) 10*3/mm3     Protime-INR [[id number]]  (Abnormal) Collected:  03/13/17 0529    Specimen:  Blood Updated:  03/13/17 0636     Protime 17.4 (H) Seconds      INR 1.44 (H)     Narrative:       Therapeutic range for most indications is 2.0-3.0 INR,  or 2.5-3.5 for mechanical heart valves.    Comprehensive Metabolic Panel [[id number]]  (Abnormal) Collected:  03/13/17 0529    Specimen:  Blood Updated:  03/13/17 0639     Glucose 87 mg/dL      BUN 46 (H) mg/dL      Creatinine 4.94 (H) mg/dL      Sodium 142 mmol/L      Potassium 3.7 mmol/L      Chloride 103 mmol/L      CO2 22.0 mmol/L      Calcium 9.0 mg/dL      Total Protein 6.7 g/dL      Albumin 3.90 g/dL      ALT (SGPT) 36 U/L      AST (SGOT) 29 U/L      Alkaline Phosphatase 65 U/L      Total Bilirubin 0.8 mg/dL      eGFR Non African Amer 11 (L) mL/min/1.73      Globulin 2.8 gm/dL      A/G Ratio 1.4 g/dL      BUN/Creatinine Ratio 9.3      Anion Gap 17.0 (H) mmol/L     Narrative:       The MDRD GFR formula is only valid for adults with stable renal function between ages 18 and 70.    Hepatitis B Core Antibody, IgM [[id number]] Collected:  03/13/17 1246    Specimen:  Blood Updated:  03/13/17 1310    Hepatitis B Surface Antibody [[id number]] Collected:  03/13/17 1246    Specimen:  Blood Updated:  03/13/17 1310    Hepatitis B Surface Antigen [[id number]] Collected:  03/13/17 1246    Specimen:  Blood Updated:  03/13/17 1310                            PT/INR:    PROTIME   Date Value Ref Range Status   03/13/2017 17.4 (H) 11.1 - 15.3 Seconds Final   03/12/2017 21.0 (H) 11.1 - 15.3 Seconds Final   03/11/2017 22.4 (H) 11.1 - 15.3 Seconds Final   /  INR   Date Value Ref Range Status   03/13/2017 1.44 (H) 0.80 - 1.20 Final   03/12/2017 1.85 (H)  0.80 - 1.20 Final   03/11/2017 2.02 (H) 0.80 - 1.20 Final               amLODIPine 5 mg Oral Nightly   carvedilol 12.5 mg Oral BID With Meals   ceftriaxone 1 g Intravenous Q24H   diltiaZEM 60 mg Oral Q6H   furosemide 40 mg Intravenous Daily   heparin (porcine) 5,000 Units Subcutaneous Q8H   isosorbide mononitrate 30 mg Oral Daily   polyethylene glycol 17 g Oral Daily   ranolazine 500 mg Oral BID   sennosides-docusate sodium 2 tablet Oral Nightly            Patient Active Problem List   Diagnosis Code   • Pulmonary embolism I26.99   • Long-term (current) use of anticoagulants Z79.01   • Pneumonia of both lungs due to infectious organism J18.9   • CKD (chronic kidney disease) stage 5, GFR less than 15 ml/min N18.5   • Volume overload E87.70   • PAF (paroxysmal atrial fibrillation) I48.0       Assessment and Plan      CKD Stage 4:  Long term access needed for hemodialysis.   Dr Esparza has been requested to place.  Detailed discussion regarding risks involved,potentia for complications and hoped for benefits of tunneled dialysis catheter.   Patient understands, agrees, and wishes to proceed with plan.   Plan placement in am in radiology.  NPO at midnight except for medications.  Hold heparin injections 3/14/17 until after procedure.  Case request and orders completed.               This document has been electronically signed by JEFFREY Blackburn on March 13, 2017 2:24 PM

## 2017-03-14 NOTE — PROGRESS NOTES
[facility] Medicine Services  INPATIENT PROGRESS NOTE    Length of Stay: 9  Date of Admission: 3/5/2017  Primary Care Physician: Dago Lopez MD    Subjective   Chief Complaint: Constipation  HPI:  83 year old male admitted with dyspnea, worsening over the last 3 weeks. Symptoms continued to get worse, with increased shortness of breath and increased lower extremity edema. He also reports abdominal edema as well as upper genitalia edema. He has gained weight several pounds in the past few weeks.    He was admitted and treated for volume overload from advanced renal disease and pneumonia.  His dyspnea has improved.  He has elected for dialysis. Renal arranging for access.    Plan for permcath placement today.    Review of Systems   Constitutional: Negative for chills and fever.   Respiratory: Negative for cough, shortness of breath and wheezing.    Cardiovascular: Negative for chest pain and leg swelling.   Gastrointestinal: Negative for abdominal pain, diarrhea, nausea and vomiting.   Neurological: Negative for headaches.      All pertinent negatives and positives are as above. All other systems have been reviewed and are negative unless otherwise stated.     Objective    Temp:  [98 °F (36.7 °C)-98.7 °F (37.1 °C)] 98.2 °F (36.8 °C)  Heart Rate:  [] 44  Resp:  [16-18] 18  BP: (110-135)/(67-78) 123/75    Physical Exam   Constitutional: He is oriented to person, place, and time. He appears well-developed and well-nourished. No distress.   HENT:   Head: Normocephalic and atraumatic.   Mouth/Throat: Oropharynx is clear and moist.   Eyes: Conjunctivae and EOM are normal.   Neck: Normal range of motion. Neck supple.   Cardiovascular: Intact distal pulses.  An irregularly irregular rhythm present. Tachycardia present.  Exam reveals no gallop and no friction rub.    No murmur heard.  Pulmonary/Chest: Effort normal and breath sounds normal. No respiratory distress. He  has no wheezes. He has no rales. He exhibits no tenderness.   Abdominal: Soft. Bowel sounds are normal. He exhibits no distension. There is no tenderness.   Musculoskeletal: Normal range of motion. He exhibits no edema or deformity.   Neurological: He is alert and oriented to person, place, and time.   Skin: Skin is warm and dry. He is not diaphoretic. No erythema.   Vitals reviewed.      Results Review:  I have reviewed the labs, radiology results, and diagnostic studies.    Laboratory Data:     Results from last 7 days  Lab Units 03/14/17  0643 03/13/17  0529 03/12/17  0524   SODIUM mmol/L 141 142 141   POTASSIUM mmol/L 3.7 3.7 4.0   CHLORIDE mmol/L 103 103 102   TOTAL CO2 mmol/L 24.0 22.0 24.0   BUN mg/dL 44* 46* 49*   CREATININE mg/dL 4.75* 4.94* 5.23*   GLUCOSE mg/dL 91 87 92   CALCIUM mg/dL 9.0 9.0 9.2   BILIRUBIN mg/dL 0.7 0.8 0.8   ALK PHOS U/L 64 65 69   ALT (SGPT) U/L 27 36 36   AST (SGOT) U/L 26 29 28   ANION GAP mmol/L 14.0 17.0* 15.0     Estimated Creatinine Clearance: 11.8 mL/min (by C-G formula based on Cr of 4.75).    Results from last 7 days  Lab Units 03/14/17  0643 03/10/17  0549 03/08/17  0535   PHOSPHORUS mg/dL 4.3 4.6* 4.8*           Results from last 7 days  Lab Units 03/14/17  0554 03/13/17  0529 03/12/17  0524 03/11/17  0625 03/10/17  0549   WBC 10*3/mm3 5.26 5.81 5.61 6.29 6.63   HEMOGLOBIN g/dL 11.3* 10.9* 11.2* 10.9* 12.4*   HEMATOCRIT % 34.5* 33.0* 33.6* 32.8* 36.9*   PLATELETS 10*3/mm3 184 166 174 187 223       Results from last 7 days  Lab Units 03/14/17  0555 03/13/17  0529 03/12/17  0524 03/11/17  0625 03/10/17  0549   INR  1.44* 1.44* 1.85* 2.02* 1.93*       Culture Data:   No results found for: BLOODCX  No results found for: URINECX  No results found for: RESPCX  No results found for: WOUNDCX  No results found for: STOOLCX  No components found for: BODYFLD    Radiology Data:   Imaging Results (last 24 hours)     ** No results found for the last 24 hours. **          I have reviewed  the patient current medications.     Assessment/Plan     Hospital Problem List     * (Principal)Pneumonia of both lungs due to infectious organism    Long-term (current) use of anticoagulants    CKD (chronic kidney disease) stage 5, GFR less than 15 ml/min    Volume overload    PAF (paroxysmal atrial fibrillation)          Plan:   Rocephin  IV lasix  Renal following, SCr stable  Permcath placement  Cardizem added for rate control yesterday    Discharge Planning: I expect patient to be discharged to home when HD arranged.     Artem Dillard, JEFFREY   03/14/17   10:29 AM

## 2017-03-14 NOTE — PROGRESS NOTES
Nephrology Progress Note:    Planned PermCath placement.  No chest pain or shortness of breath.  Heart rate is better, although still fluctuating.  He is on Coreg and Cardizem.  Appetite is fair.  Complains of fatigue.  Appreciate vascular evaluation.      Patient Active Problem List   Diagnosis   • Pulmonary embolism   • Long-term (current) use of anticoagulants   • Pneumonia of both lungs due to infectious organism   • CKD (chronic kidney disease) stage 5, GFR less than 15 ml/min   • Volume overload   • PAF (paroxysmal atrial fibrillation)       Medications:    amLODIPine 5 mg Oral Nightly   carvedilol 12.5 mg Oral BID With Meals   ceftriaxone 1 g Intravenous Q24H   diltiaZEM 60 mg Oral Q6H   furosemide 40 mg Intravenous Daily   heparin (porcine) 5,000 Units Subcutaneous Q8H   isosorbide mononitrate 30 mg Oral Daily   polyethylene glycol 17 g Oral Daily   ranolazine 500 mg Oral BID   sennosides-docusate sodium 2 tablet Oral Nightly        Vitals:    03/14/17 0748 03/14/17 0825 03/14/17 1005 03/14/17 1516   BP:  123/75  121/78   BP Location:  Right arm  Right arm   Patient Position:  Sitting  Lying   Pulse: 100 117 (!) 44 107   Resp:  18  18   Temp:  98.2 °F (36.8 °C)  98 °F (36.7 °C)   TempSrc:  Temporal Artery   Oral   SpO2:  90%  90%   Weight:       Height:         I/O last 3 completed shifts:  In: 650 [P.O.:650]  Out: -        On examination:  General:   Thin built, comfortable.  No distress.  Alert and oriented.   Family at bedside  HEENT:  Pallor present, no icterus.    Chest:  Diminished breath sounds, no rales or wheeze  CVS:   Irregular heart sounds, no pericardial rub or gallop   Abdomen: soft, distended, non tender, normal bowel sounds.    Extremities:  Trace  dewayne edema.   Arm edema, improving.   No calf pain   Neuro:  Grade 4 power both upper and lower limbs.   Mentation:  Alert and oriented.      Past medical illness, social history, medications, previous notes reviewed.       Laboratory results:       Recent Results (from the past 24 hour(s))   CBC Auto Differential    Collection Time: 03/14/17  5:54 AM   Result Value Ref Range    WBC 5.26 3.20 - 9.80 10*3/mm3    RBC 4.01 (L) 4.37 - 5.74 10*6/mm3    Hemoglobin 11.3 (L) 13.7 - 17.3 g/dL    Hematocrit 34.5 (L) 39.0 - 49.0 %    MCV 86.0 80.0 - 98.0 fL    MCH 28.2 26.5 - 34.0 pg    MCHC 32.8 31.5 - 36.3 g/dL    RDW 16.1 (H) 11.5 - 14.5 %    RDW-SD 50.7 (H) 35.1 - 43.9 fl    MPV 11.6 8.0 - 12.0 fL    Platelets 184 150 - 450 10*3/mm3    Neutrophil % 54.3 37.0 - 80.0 %    Lymphocyte % 26.8 10.0 - 50.0 %    Monocyte % 11.6 0.0 - 12.0 %    Eosinophil % 5.9 0.0 - 7.0 %    Basophil % 0.6 0.0 - 2.0 %    Immature Grans % 0.8 (H) 0.0 - 0.5 %    Neutrophils, Absolute 2.86 2.00 - 8.60 10*3/mm3    Lymphocytes, Absolute 1.41 0.60 - 4.20 10*3/mm3    Monocytes, Absolute 0.61 0.00 - 0.90 10*3/mm3    Eosinophils, Absolute 0.31 0.00 - 0.70 10*3/mm3    Basophils, Absolute 0.03 0.00 - 0.20 10*3/mm3    Immature Grans, Absolute 0.04 (H) 0.00 - 0.02 10*3/mm3   Scan Slide    Collection Time: 03/14/17  5:54 AM   Result Value Ref Range    Ovalocytes Mod/2+ None Seen    WBC Morphology Normal Normal    Large Platelets Slight/1+ None Seen   Protime-INR    Collection Time: 03/14/17  5:55 AM   Result Value Ref Range    Protime 17.4 (H) 11.1 - 15.3 Seconds    INR 1.44 (H) 0.80 - 1.20   Hepatitis B Surface Antibody    Collection Time: 03/14/17  5:55 AM   Result Value Ref Range    Hepatitis Bs Ab Index <5.00 (H) 0.00 - 4.99    Hep B S Ab Non-Immune (A) Immune   Hepatitis B Surface Antigen    Collection Time: 03/14/17  5:55 AM   Result Value Ref Range    Hepatitis B Surface Ag Negative Negative   Hepatitis C Antibody    Collection Time: 03/14/17  5:55 AM   Result Value Ref Range    Hepatitis C Ab Negative Negative   Comprehensive Metabolic Panel    Collection Time: 03/14/17  6:43 AM   Result Value Ref Range    Glucose 91 60 - 100 mg/dL    BUN 44 (H) 7 - 21 mg/dL    Creatinine 4.75 (H) 0.70 - 1.30  mg/dL    Sodium 141 137 - 145 mmol/L    Potassium 3.7 3.5 - 5.1 mmol/L    Chloride 103 95 - 110 mmol/L    CO2 24.0 22.0 - 31.0 mmol/L    Calcium 9.0 8.4 - 10.2 mg/dL    Total Protein 6.5 6.3 - 8.6 g/dL    Albumin 3.90 3.40 - 4.80 g/dL    ALT (SGPT) 27 21 - 72 U/L    AST (SGOT) 26 17 - 59 U/L    Alkaline Phosphatase 64 38 - 126 U/L    Total Bilirubin 0.7 0.2 - 1.3 mg/dL    eGFR Non African Amer 12 (L) >60 mL/min/1.73    Globulin 2.6 2.3 - 3.5 gm/dL    A/G Ratio 1.5 1.1 - 1.8 g/dL    BUN/Creatinine Ratio 9.3 7.0 - 25.0    Anion Gap 14.0 5.0 - 15.0 mmol/L   Phosphorus    Collection Time: 03/14/17  6:43 AM   Result Value Ref Range    Phosphorus 4.3 2.4 - 4.4 mg/dL   ]    Imaging Results (last 24 hours)     Procedure Component Value Units Date/Time    XR Chest 2 View [[id number]] Collected:  03/05/17 1104     Updated:  03/05/17 1107    Narrative:       Patient Name:  MR. SARITA SMITH  Patient ID:  [id number]   Ordering:  ROSANGELA RAMEY  Attending:  ROSANGELA RAMEY  Referring:  ROSANGELA RAMEY  ------------------------------------------------  Chest PA and lateral  CLINICAL INDICATION: Shortness of breath.    COMPARISON: Chest October 9, 2015.    FINDINGS: Midline sternal sutures from prior cardiac surgery.  Interval development of bilateral basilar infiltrative changes  and small pleural effusions. Interval development of prominence  of the pulmonary vasculature. Unfavorable change since prior  exam.      Impression:       CONCLUSION: Interval development of bilateral infiltrative  changes either atelectasis or pneumonitis and small bilateral  pleural effusions. Interval development of prominence of the  pulmonary vasculature. Unfavorable change since prior exam.    Electronically signed by:  Jono Dillon MD  3/5/2017 11:06 AM CST  Workstation: [id number]        Assessment:   Discussed with patient and family detail.      Ezio Sweeney MD    Acute renal failure  Chronic kidney disease, stage 5  Shortness of  breath   Fluid overload, edema  Possible lung infiltrates  Metabolic acidosis  CAD, CABG, Atrial fibrillation  Coagulopathy, INR high  Renal stones, obstr uropathy in past.     Plan:     Stage V chronic disease, ESRD:  Renal functions are stable, marginally better.  GFR is still significantly low at 12.  He presented with volume overload, uremia.  Patient wants to consider dialysis treatment.  He is planned for PermCath placement today.    Plan short dialysis tomorrow, and see how this is tolerated.  He has a history of atrial fibrillation with fluctuant heart rate    Volume overload appears improved.  He is on IV Lasix, the dose has been cut back.  Change to oral Lasix once hemodialysis sessions are started.    History of paroxysmal atrial fibrillation, thromboembolic disease.  Restart anticoagulation after PermCath placement.  No bleeding manifestations at this time.    Coronary artery disease, history of congestive heart failure: Fluid status is improved.  Continued on IV diuretics.    Risks benefits and complications also PermCath placement, including risks of infection, vascular injury and thrombosis, bleeding manifestations, have been discussed with the patient and his family in detail and all questions have been answered.  Greatly appreciate vascular evaluation.  Plan hemodialysis tomorrow.  Outpatient hemodialysis spot has been requested.      Ezio Sweeney MD

## 2017-03-15 NOTE — PROGRESS NOTES
[facility] Medicine Services  INPATIENT PROGRESS NOTE    Length of Stay: 10  Date of Admission: 3/5/2017  Primary Care Physician: Dago Lopez MD    Subjective   Chief Complaint: Constipation  HPI:  83 year old male admitted with dyspnea, worsening over the last 3 weeks. Symptoms continued to get worse, with increased shortness of breath and increased lower extremity edema. He also reports abdominal edema as well as upper genitalia edema. He has gained weight several pounds in the past few weeks.    He was admitted and treated for volume overload from advanced renal disease and pneumonia.  His dyspnea has improved.  He has elected for dialysis. Renal arranging for access.    S/p premcath placement.  Seen on dialysis.    Review of Systems   Constitutional: Negative for chills and fever.   Respiratory: Negative for cough, shortness of breath and wheezing.    Cardiovascular: Negative for chest pain and leg swelling.   Gastrointestinal: Negative for abdominal pain, diarrhea, nausea and vomiting.   Neurological: Negative for headaches.      All pertinent negatives and positives are as above. All other systems have been reviewed and are negative unless otherwise stated.     Objective    Temp:  [97.8 °F (36.6 °C)-98.2 °F (36.8 °C)] 97.8 °F (36.6 °C)  Heart Rate:  [] 120  Resp:  [14-18] 14  BP: (121-141)/(63-90) 139/90    Physical Exam   Constitutional: He is oriented to person, place, and time. He appears well-developed and well-nourished. No distress.   HENT:   Head: Normocephalic and atraumatic.   Mouth/Throat: Oropharynx is clear and moist.   Eyes: Conjunctivae and EOM are normal.   Neck: Normal range of motion. Neck supple.   Cardiovascular: Normal rate and intact distal pulses.  An irregularly irregular rhythm present. Exam reveals no gallop and no friction rub.    No murmur heard.  Pulmonary/Chest: Effort normal and breath sounds normal. No respiratory  distress. He has no wheezes. He has no rales. He exhibits no tenderness.   Abdominal: Soft. Bowel sounds are normal. He exhibits no distension. There is no tenderness.   Musculoskeletal: Normal range of motion. He exhibits no edema or deformity.   Neurological: He is alert and oriented to person, place, and time.   Skin: Skin is warm and dry. He is not diaphoretic. No erythema.   Vitals reviewed.      Results Review:  I have reviewed the labs, radiology results, and diagnostic studies.    Laboratory Data:     Results from last 7 days  Lab Units 03/15/17  0519 03/14/17  0643 03/13/17  0529   SODIUM mmol/L 142 141 142   POTASSIUM mmol/L 4.5 3.7 3.7   CHLORIDE mmol/L 102 103 103   TOTAL CO2 mmol/L 24.0 24.0 22.0   BUN mg/dL 43* 44* 46*   CREATININE mg/dL 5.02* 4.75* 4.94*   GLUCOSE mg/dL 92 91 87   CALCIUM mg/dL 9.5 9.0 9.0   BILIRUBIN mg/dL 0.8 0.7 0.8   ALK PHOS U/L 66 64 65   ALT (SGPT) U/L 30 27 36   AST (SGOT) U/L 28 26 29   ANION GAP mmol/L 16.0* 14.0 17.0*     Estimated Creatinine Clearance: 11.2 mL/min (by C-G formula based on Cr of 5.02).    Results from last 7 days  Lab Units 03/14/17  0643 03/10/17  0549   PHOSPHORUS mg/dL 4.3 4.6*           Results from last 7 days  Lab Units 03/15/17  0519 03/14/17  0554 03/13/17  0529 03/12/17  0524 03/11/17  0625   WBC 10*3/mm3 6.88 5.26 5.81 5.61 6.29   HEMOGLOBIN g/dL 11.3* 11.3* 10.9* 11.2* 10.9*   HEMATOCRIT % 34.3* 34.5* 33.0* 33.6* 32.8*   PLATELETS 10*3/mm3 185 184 166 174 187       Results from last 7 days  Lab Units 03/15/17  0519 03/14/17  0555 03/13/17  0529 03/12/17  0524 03/11/17  0625   INR  1.28* 1.44* 1.44* 1.85* 2.02*       Culture Data:   No results found for: BLOODCX  No results found for: URINECX  No results found for: RESPCX  No results found for: WOUNDCX  No results found for: STOOLCX  No components found for: BODYFLD    Radiology Data:   Imaging Results (last 24 hours)     ** No results found for the last 24 hours. **          I have reviewed the  patient current medications.     Assessment/Plan     Hospital Problem List     * (Principal)Pneumonia of both lungs due to infectious organism    Long-term (current) use of anticoagulants    CKD (chronic kidney disease) stage 5, GFR less than 15 ml/min    Volume overload    PAF (paroxysmal atrial fibrillation)          Plan:   Rocephin day 10/10, will d/c  IV lasix to PO  HD today  Case management for outpatient dialysis chair  Cardizem  mg daily    Discharge Planning: I expect patient to be discharged to home when HD arranged.     Artem Dillard, APRN   03/15/17   1:51 PM

## 2017-03-15 NOTE — PROGRESS NOTES
Nephrology Progress Note:    Seen and examined on HD.   Had permcath placement.  Has some soreness at catheter site.  No CP or SOA.  HR stable today.  Edema improved.        Patient Active Problem List   Diagnosis   • Pulmonary embolism   • Long-term (current) use of anticoagulants   • Pneumonia of both lungs due to infectious organism   • CKD (chronic kidney disease) stage 5, GFR less than 15 ml/min   • Volume overload   • PAF (paroxysmal atrial fibrillation)       Medications:    amLODIPine 5 mg Oral Nightly   carvedilol 12.5 mg Oral BID With Meals   ceftriaxone 1 g Intravenous Q24H   [START ON 3/16/2017] diltiaZEM  mg Oral Q24H   furosemide 40 mg Oral Daily   heparin (porcine) 5,000 Units Subcutaneous Q8H   isosorbide mononitrate 30 mg Oral Daily   polyethylene glycol 17 g Oral Daily   ranolazine 500 mg Oral BID   sennosides-docusate sodium 2 tablet Oral Nightly        Vitals:    03/14/17 1900 03/14/17 2300 03/15/17 0740 03/15/17 0822   BP: 141/82   139/63   BP Location: Right arm   Right arm   Patient Position: Lying   Sitting   Pulse: 101 78 52 54   Resp: 16   16   Temp:    98.2 °F (36.8 °C)   TempSrc: Oral   Oral   SpO2: 95%   97%   Weight:       Height:         I/O last 3 completed shifts:  In: 360 [P.O.:360]  Out: -   I/O this shift:  In: 240 [P.O.:240]  Out: -     On examination:  General:   Thin built, comfortable.  No distress.  Alert and oriented.   Seen on HD  HEENT:  Pallor present, no icterus.    Chest:  Diminished breath sounds, no rales or wheeze  CVS:   Irregular heart sounds, no pericardial rub or gallop   Abdomen: soft, distended, non tender, normal bowel sounds.    Extremities:  Trace  dewayne edema.   Arm edema, improving.   No calf pain   Neuro:  Grade 4 power both upper and lower limbs.   Mentation:  Alert and oriented.      Past medical illness, social history, medications, previous notes reviewed.       Laboratory results:      Recent Results (from the past 24 hour(s))   Comprehensive  Metabolic Panel    Collection Time: 03/15/17  5:19 AM   Result Value Ref Range    Glucose 92 60 - 100 mg/dL    BUN 43 (H) 7 - 21 mg/dL    Creatinine 5.02 (H) 0.70 - 1.30 mg/dL    Sodium 142 137 - 145 mmol/L    Potassium 4.5 3.5 - 5.1 mmol/L    Chloride 102 95 - 110 mmol/L    CO2 24.0 22.0 - 31.0 mmol/L    Calcium 9.5 8.4 - 10.2 mg/dL    Total Protein 7.0 6.3 - 8.6 g/dL    Albumin 4.00 3.40 - 4.80 g/dL    ALT (SGPT) 30 21 - 72 U/L    AST (SGOT) 28 17 - 59 U/L    Alkaline Phosphatase 66 38 - 126 U/L    Total Bilirubin 0.8 0.2 - 1.3 mg/dL    eGFR Non African Amer 11 (L) >60 mL/min/1.73    Globulin 3.0 2.3 - 3.5 gm/dL    A/G Ratio 1.3 1.1 - 1.8 g/dL    BUN/Creatinine Ratio 8.6 7.0 - 25.0    Anion Gap 16.0 (H) 5.0 - 15.0 mmol/L   Protime-INR    Collection Time: 03/15/17  5:19 AM   Result Value Ref Range    Protime 15.9 (H) 11.1 - 15.3 Seconds    INR 1.28 (H) 0.80 - 1.20   CBC Auto Differential    Collection Time: 03/15/17  5:19 AM   Result Value Ref Range    WBC 6.88 3.20 - 9.80 10*3/mm3    RBC 3.95 (L) 4.37 - 5.74 10*6/mm3    Hemoglobin 11.3 (L) 13.7 - 17.3 g/dL    Hematocrit 34.3 (L) 39.0 - 49.0 %    MCV 86.8 80.0 - 98.0 fL    MCH 28.6 26.5 - 34.0 pg    MCHC 32.9 31.5 - 36.3 g/dL    RDW 15.8 (H) 11.5 - 14.5 %    RDW-SD 50.2 (H) 35.1 - 43.9 fl    MPV 11.9 8.0 - 12.0 fL    Platelets 185 150 - 450 10*3/mm3    Neutrophil % 66.8 37.0 - 80.0 %    Lymphocyte % 19.0 10.0 - 50.0 %    Monocyte % 9.2 0.0 - 12.0 %    Eosinophil % 4.2 0.0 - 7.0 %    Basophil % 0.4 0.0 - 2.0 %    Immature Grans % 0.4 0.0 - 0.5 %    Neutrophils, Absolute 4.59 2.00 - 8.60 10*3/mm3    Lymphocytes, Absolute 1.31 0.60 - 4.20 10*3/mm3    Monocytes, Absolute 0.63 0.00 - 0.90 10*3/mm3    Eosinophils, Absolute 0.29 0.00 - 0.70 10*3/mm3    Basophils, Absolute 0.03 0.00 - 0.20 10*3/mm3    Immature Grans, Absolute 0.03 (H) 0.00 - 0.02 10*3/mm3   ]    Imaging Results (last 24 hours)     Procedure Component Value Units Date/Time    XR Chest 2 View [[id number]]  Collected:  03/05/17 1104     Updated:  03/05/17 1107    Narrative:       Patient Name:  MR. SARITA SMITH  Patient ID:  [id number]   Ordering:  ROSANGELA RAMEY  Attending:  ROSANGELA RAMEY  Referring:  ROSANGELA RAMEY  ------------------------------------------------  Chest PA and lateral  CLINICAL INDICATION: Shortness of breath.    COMPARISON: Chest October 9, 2015.    FINDINGS: Midline sternal sutures from prior cardiac surgery.  Interval development of bilateral basilar infiltrative changes  and small pleural effusions. Interval development of prominence  of the pulmonary vasculature. Unfavorable change since prior  exam.      Impression:       CONCLUSION: Interval development of bilateral infiltrative  changes either atelectasis or pneumonitis and small bilateral  pleural effusions. Interval development of prominence of the  pulmonary vasculature. Unfavorable change since prior exam.    Electronically signed by:  Jono Dillon MD  3/5/2017 11:06 AM CST  Workstation: [id number]        Assessment:   Discussed with patient and family detail.      Ezio Sweeney MD    End Stage renal disease   Fluid overload, edema  Metabolic acidosis  CAD, CABG, Atrial fibrillation  Renal stones, obstr uropathy in past.     Plan:     ESRD:   Seen and examined on HD.   First trt today.   Short HD and planned HD tomorrow.   Has permcath.  Outpatient HD being arranged.     CHF, volume overload:  Improved.   Stop IV Lasix and change to PO.   On HD    HTN and afib:  Improved HR.   Okay to restart Coumadin     Outpatient HD being arranged.  Patient had initially not wanted to do dialysis.   Now he has decided to try dialysis.   We have to get AVF placed if he decides to stay on HD, this will be arranged as outpatient.   I have discussed with patient in dialysis.      Planned HD tomorrow.  Hopefully outpatient HD/ insurance approval can be obtained by tomorrow for discharge home after HD.       Ezio Sweeney MD

## 2017-03-15 NOTE — PLAN OF CARE
Problem: Patient Care Overview (Adult)  Goal: Plan of Care Review  Outcome: Ongoing (interventions implemented as appropriate)    03/15/17 0531   Coping/Psychosocial Response Interventions   Plan Of Care Reviewed With patient   Patient Care Overview   Progress improving   Outcome Evaluation   Outcome Summary/Follow up Plan Pt got permacath placed yesterday (3/14/2017) and is eager to go home after he receives dialysis; vital signs stable; will continue to monitor       Goal: Adult Individualization and Mutuality  Outcome: Ongoing (interventions implemented as appropriate)  Goal: Discharge Needs Assessment  Outcome: Ongoing (interventions implemented as appropriate)    Problem: Fluid Volume Excess (Adult,Obstetrics,Pediatric)  Goal: Stable Weight  Outcome: Ongoing (interventions implemented as appropriate)  Goal: Balanced Intake/Output  Outcome: Ongoing (interventions implemented as appropriate)    Problem: Pain, Acute (Adult)  Goal: Acceptable Pain Control/Comfort Level  Outcome: Ongoing (interventions implemented as appropriate)    Problem: Renal Failure/Kidney Injury, Acute (Adult)  Goal: Signs and Symptoms of Listed Potential Problems Will be Absent or Manageable (Renal Failure/Kidney Injury, Acute)  Outcome: Ongoing (interventions implemented as appropriate)    Problem: Fall Risk (Adult)  Goal: Absence of Falls  Outcome: Ongoing (interventions implemented as appropriate)

## 2017-03-16 NOTE — DISCHARGE SUMMARY
[facility] Medicine Services  DISCHARGE SUMMARY       Date of Admission: 3/5/2017  Date of Discharge:  3/16/2017  Primary Care Physician: Dago Lopez MD    Presenting Problem/History of Present Illness:  Elevated INR [R79.1]  Renal failure (ARF), acute on chronic [N17.9, N18.9]  Acute congestive heart failure, unspecified congestive heart failure type [I50.9]  Pneumonia of both lungs due to infectious organism, unspecified part of lung [J18.9]        Final Discharge Diagnoses:  Hospital Problem List     * (Principal)Pneumonia of both lungs due to infectious organism    Long-term (current) use of anticoagulants    CKD (chronic kidney disease) stage 5, GFR less than 15 ml/min    Volume overload    PAF (paroxysmal atrial fibrillation)          Consults:   Consults     Date and Time Order Name Status Description    3/10/2017 0940 Inpatient Consult to Vascular Surgery      3/5/2017 1403 Hospitalist (on-call MD unless specified)      3/5/2017 1403 Nephrology - DONNA (on-call MD from [facility] unless specified) Completed           Procedures Performed: Procedure(s):  tunneled central venous catheter placement                Pertinent Test Results:   Lab Results (last 24 hours)     Procedure Component Value Units Date/Time    CBC & Differential [[id number]] Collected:  03/16/17 0546    Specimen:  Blood Updated:  03/16/17 0656    Narrative:       The following orders were created for panel order CBC & Differential.  Procedure                               Abnormality         Status                     ---------                               -----------         ------                     CBC Auto Differential[[id number]]         Abnormal            Final result                 Please view results for these tests on the individual orders.    CBC Auto Differential [[id number]]  (Abnormal) Collected:  03/16/17 0546    Specimen:  Blood Updated:  03/16/17 0656     WBC 6.01 10*3/mm3      RBC  3.78 (L) 10*6/mm3      Hemoglobin 10.7 (L) g/dL      Hematocrit 32.8 (L) %      MCV 86.8 fL      MCH 28.3 pg      MCHC 32.6 g/dL      RDW 15.9 (H) %      RDW-SD 50.4 (H) fl      MPV 11.4 fL      Platelets 160 10*3/mm3      Neutrophil % 56.1 %      Lymphocyte % 25.5 %      Monocyte % 12.6 (H) %      Eosinophil % 4.5 %      Basophil % 0.5 %      Immature Grans % 0.8 (H) %      Neutrophils, Absolute 3.37 10*3/mm3      Lymphocytes, Absolute 1.53 10*3/mm3      Monocytes, Absolute 0.76 10*3/mm3      Eosinophils, Absolute 0.27 10*3/mm3      Basophils, Absolute 0.03 10*3/mm3      Immature Grans, Absolute 0.05 (H) 10*3/mm3     Comprehensive Metabolic Panel [[id number]]  (Abnormal) Collected:  03/16/17 0546    Specimen:  Blood Updated:  03/16/17 0704     Glucose 92 mg/dL      BUN 32 (H) mg/dL      Creatinine 3.97 (H) mg/dL      Sodium 139 mmol/L      Potassium 4.0 mmol/L      Chloride 102 mmol/L      CO2 24.0 mmol/L      Calcium 8.9 mg/dL      Total Protein 6.6 g/dL      Albumin 3.80 g/dL      ALT (SGPT) 21 U/L      AST (SGOT) 24 U/L      Alkaline Phosphatase 62 U/L      Total Bilirubin 0.9 mg/dL      eGFR Non African Amer 15 (L) mL/min/1.73      Globulin 2.8 gm/dL      A/G Ratio 1.4 g/dL      BUN/Creatinine Ratio 8.1      Anion Gap 13.0 mmol/L     Narrative:       The MDRD GFR formula is only valid for adults with stable renal function between ages 18 and 70.    Protime-INR [[id number]]  (Abnormal) Collected:  03/16/17 0546    Specimen:  Blood Updated:  03/16/17 0714     Protime 16.1 (H) Seconds      INR 1.30 (H)     Narrative:       Therapeutic range for most indications is 2.0-3.0 INR,  or 2.5-3.5 for mechanical heart valves.        Imaging Results (last 24 hours)     ** No results found for the last 24 hours. **            Chief Complaint on Day of Discharge: none    Hospital Course:  The patient is a 83 y.o. male who presented to [facility] with worsening shortness of breath and weight gain.  He was noted  "found to have bilateral lower lobe pneumonia as well as acute kidney injury on top of his already stage V chronic kidney disease.  Since initial hospitalization, patient has required dialysis treatments related to a creatinine of 6.1 and uremia.  The patient was followed by Dr. Holguin during his stay and Permcath was placed for dialysis access.  Arrangements were made for the patient to continue his dialysis on outpatient basis.  He will be discharged home today in stable condition after he completes his dialysis session.      Condition on Discharge:  stable    Physical Exam on Discharge:  Visit Vitals   • /80 (BP Location: Right arm, Patient Position: Lying)   • Pulse 118   • Temp 97.6 °F (36.4 °C) (Oral)   • Resp 18   • Ht 74\" (188 cm)   • Wt 156 lb 1 oz (70.8 kg)   • SpO2 96%   • BMI 20.04 kg/m2     Physical Exam   Constitutional: He is oriented to person, place, and time. He appears well-developed and well-nourished.   HENT:   Head: Normocephalic.   Eyes: Conjunctivae are normal.   Neck: Neck supple.   Cardiovascular: Normal rate, regular rhythm, normal heart sounds and intact distal pulses.  Exam reveals no gallop and no friction rub.    No murmur heard.  Pulmonary/Chest: Effort normal and breath sounds normal. No respiratory distress. He has no wheezes. He has no rales. He exhibits no tenderness.   Abdominal: Soft. Bowel sounds are normal.   Musculoskeletal: Normal range of motion.   Neurological: He is alert and oriented to person, place, and time.   Skin: Skin is warm and dry.   Psychiatric: He has a normal mood and affect. His behavior is normal.   Vitals reviewed.        Discharge Disposition:  Home or Self Care    Discharge Medications:   Roni Middleton   Home Medication Instructions NOVA:[id number]    Printed on:03/16/17 1120   Medication Information                      acetaminophen-codeine (TYLENOL #3) 300-30 MG per tablet  Take 1 tablet by mouth Every 6 (Six) Hours As Needed for moderate " pain (4-6).             carvedilol (COREG) 12.5 MG tablet  Take 1 tablet by mouth 2 (Two) Times a Day With Meals.             colchicine 0.6 MG tablet  Take 0.6 mg by mouth Daily.             diltiaZEM CD (CARDIZEM CD) 120 MG 24 hr capsule  Take 1 capsule by mouth Daily.             furosemide (LASIX) 40 MG tablet  Take 1 tablet by mouth Daily.             isosorbide mononitrate (IMDUR) 30 MG 24 hr tablet  Take 30 mg by mouth Daily.             Misc Natural Products (BLACK CHERRY CONCENTRATE PO)  Take 1 capsule by mouth Daily. For gout             Multiple Vitamins-Minerals (SUPER SAJAN WESLY 75/BETA NAPIER PO)  Take 1 capsule by mouth Daily. For prostate             polyethylene glycol (MIRALAX) packet  Take 17 g by mouth Daily.             ranolazine (RANEXA) 500 MG 12 hr tablet  Take 500 mg by mouth 2 (Two) Times a Day.             warfarin (COUMADIN) 2.5 MG tablet  Take 2.5 mg by mouth Daily.                 Discharge Diet:   Diet Instructions     Diet: Renal; Thin Liquids, No Restrictions       Discharge Diet:  Renal   Fluid Consistency:  Thin Liquids, No Restrictions                 Activity at Discharge:   Activity Instructions     Activity as Tolerated                     Discharge Care Plan/Instructions: See PCP in one week, Dr. Holguin in one week, and keep follow up appointments with coumadin clinic.     Follow-up Appointments:   Future Appointments  Date Time Provider Department Center   4/3/2017 8:15 AM COUMADIN CLINIC CT [facility] None       Test Results Pending at Discharge:     JEFFREY Grewal  03/16/17  11:28 AM

## 2017-03-16 NOTE — PROGRESS NOTES
Nephrology Progress Note:    Patient had 2 hours of dialysis yesterday, he is planned for dialysis treatment today.  He denies any chest pain or shortness of breath.  No palpitations.  Edema is much improved.  No pain or discomfort at the PermCath site.  Appetite is good.      Patient Active Problem List   Diagnosis   • Pulmonary embolism   • Long-term (current) use of anticoagulants   • Pneumonia of both lungs due to infectious organism   • CKD (chronic kidney disease) stage 5, GFR less than 15 ml/min   • Volume overload   • PAF (paroxysmal atrial fibrillation)       Medications:    amLODIPine 5 mg Oral Nightly   carvedilol 12.5 mg Oral BID With Meals   diltiaZEM  mg Oral Q24H   furosemide 40 mg Oral Daily   heparin (porcine) 5,000 Units Subcutaneous Q8H   isosorbide mononitrate 30 mg Oral Daily   polyethylene glycol 17 g Oral Daily   ranolazine 500 mg Oral BID   sennosides-docusate sodium 2 tablet Oral Nightly        Vitals:    03/16/17 0430 03/16/17 0447 03/16/17 0716 03/16/17 0740   BP: 131/69   106/80   BP Location: Right arm   Right arm   Patient Position: Lying   Lying   Pulse: 74 (!) 165 116 118   Resp: 18   18   Temp: 99.1 °F (37.3 °C)   97.6 °F (36.4 °C)   TempSrc: Temporal Artery    Oral   SpO2: 92%   96%   Weight:       Height:         I/O last 3 completed shifts:  In: 880 [P.O.:780; IV Piggyback:100]  Out: 1000 [Other:1000]       On examination:  General:   Thin built, comfortable.  No distress.  Alert and oriented.     HEENT:  Pallor present, no icterus.    Chest:  Diminished breath sounds, no rales or wheeze  CVS:   Irregular heart sounds, no pericardial rub or gallop   Abdomen: soft, distended, non tender, normal bowel sounds.    Extremities:  Trace  dewayne edema.   Arm edema, improving.   No calf pain   Neuro:  Grade 4 power both upper and lower limbs.   Mentation:  Alert and oriented.      Past medical illness, social history, medications, previous notes reviewed.       Laboratory results:       Recent Results (from the past 24 hour(s))   Comprehensive Metabolic Panel    Collection Time: 03/16/17  5:46 AM   Result Value Ref Range    Glucose 92 60 - 100 mg/dL    BUN 32 (H) 7 - 21 mg/dL    Creatinine 3.97 (H) 0.70 - 1.30 mg/dL    Sodium 139 137 - 145 mmol/L    Potassium 4.0 3.5 - 5.1 mmol/L    Chloride 102 95 - 110 mmol/L    CO2 24.0 22.0 - 31.0 mmol/L    Calcium 8.9 8.4 - 10.2 mg/dL    Total Protein 6.6 6.3 - 8.6 g/dL    Albumin 3.80 3.40 - 4.80 g/dL    ALT (SGPT) 21 21 - 72 U/L    AST (SGOT) 24 17 - 59 U/L    Alkaline Phosphatase 62 38 - 126 U/L    Total Bilirubin 0.9 0.2 - 1.3 mg/dL    eGFR Non African Amer 15 (L) >60 mL/min/1.73    Globulin 2.8 2.3 - 3.5 gm/dL    A/G Ratio 1.4 1.1 - 1.8 g/dL    BUN/Creatinine Ratio 8.1 7.0 - 25.0    Anion Gap 13.0 5.0 - 15.0 mmol/L   Protime-INR    Collection Time: 03/16/17  5:46 AM   Result Value Ref Range    Protime 16.1 (H) 11.1 - 15.3 Seconds    INR 1.30 (H) 0.80 - 1.20   CBC Auto Differential    Collection Time: 03/16/17  5:46 AM   Result Value Ref Range    WBC 6.01 3.20 - 9.80 10*3/mm3    RBC 3.78 (L) 4.37 - 5.74 10*6/mm3    Hemoglobin 10.7 (L) 13.7 - 17.3 g/dL    Hematocrit 32.8 (L) 39.0 - 49.0 %    MCV 86.8 80.0 - 98.0 fL    MCH 28.3 26.5 - 34.0 pg    MCHC 32.6 31.5 - 36.3 g/dL    RDW 15.9 (H) 11.5 - 14.5 %    RDW-SD 50.4 (H) 35.1 - 43.9 fl    MPV 11.4 8.0 - 12.0 fL    Platelets 160 150 - 450 10*3/mm3    Neutrophil % 56.1 37.0 - 80.0 %    Lymphocyte % 25.5 10.0 - 50.0 %    Monocyte % 12.6 (H) 0.0 - 12.0 %    Eosinophil % 4.5 0.0 - 7.0 %    Basophil % 0.5 0.0 - 2.0 %    Immature Grans % 0.8 (H) 0.0 - 0.5 %    Neutrophils, Absolute 3.37 2.00 - 8.60 10*3/mm3    Lymphocytes, Absolute 1.53 0.60 - 4.20 10*3/mm3    Monocytes, Absolute 0.76 0.00 - 0.90 10*3/mm3    Eosinophils, Absolute 0.27 0.00 - 0.70 10*3/mm3    Basophils, Absolute 0.03 0.00 - 0.20 10*3/mm3    Immature Grans, Absolute 0.05 (H) 0.00 - 0.02 10*3/mm3   ]    Imaging Results (last 24 hours)     Procedure  Component Value Units Date/Time    XR Chest 2 View [[id number]] Collected:  03/05/17 1104     Updated:  03/05/17 1107    Narrative:       Patient Name:  MR. SARITA SMITH  Patient ID:  [id number]   Ordering:  ROSANGELA RAMEY  Attending:  ROSANGELA RAMEY  Referring:  ROSANGELA RAMEY  ------------------------------------------------  Chest PA and lateral  CLINICAL INDICATION: Shortness of breath.    COMPARISON: Chest October 9, 2015.    FINDINGS: Midline sternal sutures from prior cardiac surgery.  Interval development of bilateral basilar infiltrative changes  and small pleural effusions. Interval development of prominence  of the pulmonary vasculature. Unfavorable change since prior  exam.      Impression:       CONCLUSION: Interval development of bilateral infiltrative  changes either atelectasis or pneumonitis and small bilateral  pleural effusions. Interval development of prominence of the  pulmonary vasculature. Unfavorable change since prior exam.    Electronically signed by:  Jono Dillon MD  3/5/2017 11:06 AM CST  Workstation: [id number]        Assessment:   Discussed with patient and family detail.      Ezio Sweeney MD    End Stage renal disease   Fluid overload, edema  Metabolic acidosis  CAD, CABG, Atrial fibrillation  Renal stones, obstr uropathy in past.     Plan:     ESRD:   Patient had his first dialysis treatment yesterday.  He is planned for hemodialysis today, 2 hours.  Outpatient hemodialysis spot has been arranged.  He will continue outpatient dialysis on Monday Wednesday and Friday.  We are using PermCath for dialysis access.  I have discussed the dialysis treatment with patient and his family in great detail and all questions answered.    History of atrial fibrillation: Rate controlled.  Anticoagulation to be restarted.    Essential hypertension: Blood pressure readings are stable.  Patient is now on carvedilol and Cardizem.  Discontinue amlodipine, he is on two calcium channel  blockers.    History of congestive heart failure, volume overload.  Fluid status is now improved.  Continued Lasix 40 mg by mouth daily.    On discharge, he will continue outpatient dialysis on Monday Wednesday and Friday.  Discussed with patient and family.     Ezio Sweeney MD

## 2017-03-16 NOTE — PLAN OF CARE
Problem: Patient Care Overview (Adult)  Goal: Plan of Care Review  Outcome: Ongoing (interventions implemented as appropriate)    03/15/17 0531 03/16/17 0337   Coping/Psychosocial Response Interventions   Plan Of Care Reviewed With --  spouse;patient   Patient Care Overview   Progress improving --        Goal: Adult Individualization and Mutuality  Outcome: Ongoing (interventions implemented as appropriate)  Goal: Discharge Needs Assessment  Outcome: Ongoing (interventions implemented as appropriate)    Problem: Fluid Volume Excess (Adult,Obstetrics,Pediatric)  Goal: Stable Weight  Outcome: Ongoing (interventions implemented as appropriate)  Goal: Balanced Intake/Output  Outcome: Ongoing (interventions implemented as appropriate)    Problem: Pain, Acute (Adult)  Goal: Acceptable Pain Control/Comfort Level  Outcome: Ongoing (interventions implemented as appropriate)    Problem: Renal Failure/Kidney Injury, Acute (Adult)  Goal: Signs and Symptoms of Listed Potential Problems Will be Absent or Manageable (Renal Failure/Kidney Injury, Acute)  Outcome: Ongoing (interventions implemented as appropriate)    Problem: Fall Risk (Adult)  Goal: Absence of Falls  Outcome: Ongoing (interventions implemented as appropriate)

## 2017-04-03 NOTE — PROGRESS NOTES
Pt states he has been back on coumadin 2.5mg daily for three weeks following tunnel-cath placement for dialysis.  Pt receives dialysis on M-W-F.  Pt denies missed coumadin doses.  Pt states most of his BP and HR medication has been adjusted or dc'd.  Adjusted coumadin dose and instructed pt to limit green intake for four days. Recheck INR next wk.  Pt verbalizes.  Patient instructed regarding medication; results given and questions answered. Nutritional counseling given.  Dietary factors affecting therapy addressed.  Patient instructed to monitor for excessive bruising or bleeding.  Electronically signed by JEFFREY Blackburn

## 2017-04-10 NOTE — PROGRESS NOTES
Pt denies med changes or bleeding problems. Pt states he is hardly eating due to nausea and will discuss with nephrologist need for antiemetic. Patient instructed regarding medication; results given and questions answered. Nutritional counseling given.  Dietary factors affecting therapy addressed.  Patient instructed to monitor for excessive bruising or bleeding. Will recheck next week.  Electronically signed by JEFFREY Blackburn

## 2017-04-17 NOTE — PROGRESS NOTES
Pt denies med changes or bleeding problems. Pt was instructed to have 1/2 cup serving of green veggies today and once weekly; pt verbalized. Patient instructed regarding medication; results given and questions answered. Nutritional counseling given.  Dietary factors affecting therapy addressed.  Patient instructed to monitor for excessive bruising or bleeding. Will recheck in 11 days.  Electronically signed by JEFFREY Blackburn

## 2017-04-22 PROBLEM — I10 BENIGN ESSENTIAL HTN: Status: ACTIVE | Noted: 2017-01-01

## 2017-04-22 NOTE — PROGRESS NOTES
4/20/2017    Rhome Heber Middleton  12/27/1933      Chief Complaint:  ESRD      HPI:      PCP:  Dago Lopez MD  Cardiology:  Dr Jenkins  Nephrology:  Dr Holguin, [facility]    83 y.o. male with HTN(stable), PTE(stable), Atrial fibrillation(Coumadin, stable), CKD5(stable), CAD(stable).  former smoker.  Moderate shortness of breath, leg swelling x few months.  RIGHT handed. No TIA, stroke, amaurosis.  No other associated signs, symptoms or modifying factors.    6/2016 MRI Brain:  No acute abnormality  3/2017 Tunnel catheter placement  3/14/2017 CXR:  Small bilateral pleural effusions.  4/20/2017 Upper extremity vein mapping:  RIGHT cephalic 1.3-4.6mm, basilic 1.2-2.6mm.  LEFT cephalic .6-3.4mm, basilic 2.2mm.    The following portions of the patient's history were reviewed and updated as appropriate: allergies, current medications, past family history, past medical history, past social history, past surgical history and problem list.  Recent images independently reviewed.  Available laboratory values reviewed.    PMH:  Past Medical History:   Diagnosis Date   • A-fib    • BPH (benign prostatic hyperplasia)    • Coronary artery disease    • Kidney disease    • Macular degeneration        ALLERGIES:  No Known Allergies      MEDICATIONS:    Current Outpatient Prescriptions:   •  acetaminophen-codeine (TYLENOL #3) 300-30 MG per tablet, Take 1 tablet by mouth Every 6 (Six) Hours As Needed for moderate pain (4-6)., Disp: , Rfl:   •  carvedilol (COREG) 12.5 MG tablet, Take 1 tablet by mouth 2 (Two) Times a Day With Meals., Disp: 60 tablet, Rfl: 0  •  colchicine 0.6 MG tablet, Take 0.6 mg by mouth Daily., Disp: , Rfl:   •  isosorbide mononitrate (IMDUR) 30 MG 24 hr tablet, Take 30 mg by mouth Daily., Disp: , Rfl:   •  ranolazine (RANEXA) 500 MG 12 hr tablet, Take 500 mg by mouth 2 (Two) Times a Day., Disp: , Rfl:   •  warfarin (COUMADIN) 2.5 MG tablet, Take one tablet daily except on Monday, take two tablets  or as Directed by Coumadin Clinic, Disp: 35 tablet, Rfl: 5  •  diltiaZEM CD (CARDIZEM CD) 120 MG 24 hr capsule, Take 1 capsule by mouth Daily., Disp: 30 capsule, Rfl: 0  •  furosemide (LASIX) 40 MG tablet, Take 1 tablet by mouth Daily., Disp: 30 tablet, Rfl: 0  •  Misc Natural Products (BLACK CHERRY CONCENTRATE PO), Take 1 capsule by mouth Daily. For gout, Disp: , Rfl:   •  Multiple Vitamins-Minerals (SUPER SAJAN WESLY 75/BETA NAPIER PO), Take 1 capsule by mouth Daily. For prostate, Disp: , Rfl:   •  polyethylene glycol (MIRALAX) packet, Take 17 g by mouth Daily., Disp: 500 g, Rfl: 0    Review of Systems   Review of Systems   Constitution: Positive for malaise/fatigue. Negative for night sweats and weight loss.   HENT: Negative for hearing loss, hoarse voice and stridor.    Eyes: Negative for vision loss in left eye, vision loss in right eye and visual disturbance.   Cardiovascular: Positive for irregular heartbeat. Negative for chest pain, leg swelling and palpitations.   Respiratory: Positive for shortness of breath. Negative for cough and hemoptysis.    Hematologic/Lymphatic: Negative for adenopathy and bleeding problem. Bruises/bleeds easily.   Skin: Negative for color change, poor wound healing and rash.   Musculoskeletal: Positive for arthritis. Negative for back pain, muscle weakness and neck pain.   Gastrointestinal: Negative for abdominal pain, dysphagia and heartburn.   Neurological: Negative for dizziness, numbness and seizures.   Psychiatric/Behavioral: Negative for altered mental status, depression and memory loss. The patient is not nervous/anxious.        Physical Exam   Physical Exam   Constitutional: He is oriented to person, place, and time. He is active and cooperative. He does not appear ill. No distress.   HENT:   Head: Atraumatic.   Right Ear: Hearing normal.   Left Ear: Hearing normal.   Nose: No nasal deformity. No epistaxis.   Mouth/Throat: He does not have dentures. Normal dentition.   Eyes:  Conjunctivae and lids are normal. Right pupil is round and reactive. Left pupil is round and reactive.   Neck: No JVD present. Carotid bruit is not present. No tracheal deviation present. No thyroid mass and no thyromegaly present.   Cardiovascular: Normal rate and regular rhythm.    No murmur heard.  Pulses:       Carotid pulses are 2+ on the right side, and 2+ on the left side.       Radial pulses are 2+ on the right side, and 2+ on the left side.        Femoral pulses are 2+ on the right side, and 2+ on the left side.       Dorsalis pedis pulses are 2+ on the right side, and 2+ on the left side.        Posterior tibial pulses are 1+ on the right side, and 1+ on the left side.   Pulmonary/Chest: Effort normal and breath sounds normal.   Abdominal: Soft. He exhibits no distension and no mass. There is no splenomegaly or hepatomegaly. There is no tenderness.   Musculoskeletal: He exhibits no deformity.   Gait normal.    Lymphadenopathy:     He has no cervical adenopathy.        Right: No supraclavicular adenopathy present.        Left: No supraclavicular adenopathy present.   Neurological: He is alert and oriented to person, place, and time. He has normal strength.   Skin: Skin is warm and dry. No cyanosis or erythema. No pallor.   No venous staining   Psychiatric: He has a normal mood and affect. His speech is normal. Judgment and thought content normal.   Results for RONI SMITH (MRN [id number]) as of 4/22/2017 11:16   Ref. Range 3/16/2017 05:46   Creatinine Latest Ref Range: 0.70 - 1.30 mg/dL 3.97 (H)   BUN Latest Ref Range: 7 - 21 mg/dL 32 (H)   INR 2.4     ASSESSMENT:  Roni was seen today for hemodialysis access.    Diagnoses and all orders for this visit:    CKD (chronic kidney disease) stage 5, GFR less than 15 ml/min  -     Case request  -     sodium chloride 0.9 % flush 1-10 mL; Infuse 1-10 mL into a venous catheter As Needed for Line Care.  -     sodium chloride 0.9 % infusion; Infuse 30 mL/hr  into a venous catheter Continuous.  -     ceFAZolin (ANCEF) 2 g in sodium chloride 0.9 % 100 mL IVPB; Infuse 2 g into a venous catheter 1 (One) Time.    PAF (paroxysmal atrial fibrillation)    Other pulmonary embolism without acute cor pulmonale, unspecified chronicity    Pneumonia of both lungs due to infectious organism, unspecified part of lung    Benign essential HTN    Other orders  -     Provide NPO Instructions to Patient  -     Clorhexidine Skin Prep  -     Obtain informed consent; Standing  -     Chlorhexidine 4%/Hibiclens Skin Prep; Standing  -     Place sequential compression device; Standing  -     Potassium; Standing  -     Insert Peripheral IV; Standing  -     Saline Lock & Maintain IV Access; Standing    PLAN:  Detailed discussion with Mr Yu regarding situation and options.  ESRD approaching on hemodialysis.  Long term AV access for dialysis is advisable.  vein mapping shows poor vein for fistula.      Risks including but not limited to infection, bleeding, blood vessel and nerve injury, swelling, reduced circulation to distal extremity, need for revision and repeat intervention to maintain access.  Benefits:  avoidance of catheter, long term access for dialysis.  Options:  catheter, fistula vs graft, peritoneal dialysis, renal transplantation.  Understands and wishes to proceed.    Upper extremity vein mapping today.  LEFT brachial artery to axillary vein AV graft (Artegraft).  vascular ultrasound.  Regional/GEN.  SDS.  5/2/2017    Recommended regular physical activity, progressive walking program.  Continue current medications as directed.  Will Obtain relevant old records.    Thank you for the opportunity to participate in this patient's care.    Copy to primary care provider.    EMR Dragon/Transcription disclaimer:   Much of this encounter note is an electronic transcription/translation of spoken language to printed text. The electronic translation of spoken language may permit erroneous, or  at times, nonsensical words or phrases to be inadvertently transcribed; Although I have reviewed the note for such errors, some may still exist.

## 2017-05-02 PROBLEM — N18.5 CKD (CHRONIC KIDNEY DISEASE) STAGE 5, GFR LESS THAN 15 ML/MIN (HCC): Status: ACTIVE | Noted: 2017-01-01

## 2017-05-30 PROBLEM — Z48.812 SURGICAL AFTERCARE, CIRCULATORY SYSTEM: Status: ACTIVE | Noted: 2017-01-01

## 2017-06-02 PROBLEM — Z99.2 ESRD (END STAGE RENAL DISEASE) ON DIALYSIS (HCC): Status: ACTIVE | Noted: 2017-01-01

## 2017-06-02 PROBLEM — I48.20 CHRONIC ATRIAL FIBRILLATION (HCC): Status: ACTIVE | Noted: 2017-01-01

## 2017-06-02 PROBLEM — T45.511A COUMADIN TOXICITY: Status: ACTIVE | Noted: 2017-01-01

## 2017-06-02 PROBLEM — K80.20 CHOLELITHIASES: Status: ACTIVE | Noted: 2017-01-01

## 2017-06-02 PROBLEM — N18.6 ESRD (END STAGE RENAL DISEASE) ON DIALYSIS (HCC): Status: ACTIVE | Noted: 2017-01-01

## 2017-06-02 PROBLEM — R74.8 ELEVATED LIVER ENZYMES: Status: ACTIVE | Noted: 2017-01-01

## 2017-06-02 NOTE — H&P
HISTORY AND PHYSICAL  NAME: Roni Middleton  : 1933  MRN: [id number]    DATE OF ADMISSION: 17    DATE & TIME SEEN: 17 6:45 PM    PCP: Dago Lopez MD    CODE STATUS: Prior    CHIEF COMPLAINT Increasing weakness.     HPI:  Roni Middleton is a 83 y.o. male who presents with increased generalized weakness. Family was trying to get him to go doctor today and could not raise arms or hold his own body weight up.  This has been getting progressively worse for the past 4 days.  Last week he was able to walk.  Today they were trying to get to the PCP John.  Patient went to the coumadin clinic 17.  Recently he has had to go weekly as he has been having problems controlling it recently.     CONCURRENT MEDICAL HISTORY:  Past Medical History:   Diagnosis Date   • A-fib    • Bleeding ulcer     approx 9 years ago prior to by pass surgery   • BPH (benign prostatic hyperplasia)    • Coagulopathy     coumadin   • Coronary artery disease     pt relates has been told in past has leaky valve   • Hearing loss    • History of echocardiogram 2013    flow 50548 (1)    Biatrial enlargement with mild CLVH with normal aortic root size. LV systolic function well preserved with EF of 55-60%. Mitral and AV thickened. Tricuspid valve intact. Diastolic dysfunction.    • Hypertension     since dialysis has placed some of bp meds on hold   • Kidney disease    • Kidney failure    • Macular degeneration    • Macular degeneration    • Nausea & vomiting    • Stroke     mini   • Wears glasses        PAST SURGICAL HISTORY:  Past Surgical History:   Procedure Laterality Date   • ARTERIOVENOUS FISTULA/SHUNT SURGERY Left 2017    Procedure: LEFT BRACHIAL ARTERY TO AXILLARY VEIN  ARTERIOVENOUS GRAFT     (artegraft);  Surgeon: Jovanni Esparza MD;  Location: [facility];  Service:    • BACK SURGERY     • CAROTID ENDARTERECTOMY     • CORONARY ARTERY BYPASS GRAFT     • EYE SURGERY     • FOREIGN BODY  REMOVAL Left     foot   • HERNIA REPAIR      ingunial right and left   • INTERVENTIONAL RADIOLOGY PROCEDURE N/A 3/14/2017    Procedure: tunneled central venous catheter placement;  Surgeon: Jovanni Esparza MD;  Location: [facility];  Service:        FAMILY HISTORY:  Family History   Problem Relation Age of Onset   • Heart disease Father    • Alcohol abuse Son         SOCIAL HISTORY:  Social History     Social History   • Marital status:      Spouse name: N/A   • Number of children: N/A   • Years of education: N/A     Occupational History   • Not on file.     Social History Main Topics   • Smoking status: Former Smoker     Quit date: 2/5/1970   • Smokeless tobacco: Never Used   • Alcohol use No   • Drug use: No      Comment: prescrition   • Sexual activity: Defer     Other Topics Concern   • Not on file     Social History Narrative       HOME MEDICATIONS:    Current Facility-Administered Medications:   •  sodium chloride 0.9 % infusion, 125 mL/hr, Intravenous, Continuous, Chivo Fernandez MD, Last Rate: 125 mL/hr at 06/02/17 1319, 125 mL/hr at 06/02/17 1319    Current Outpatient Prescriptions:   •  acetaminophen-codeine (TYLENOL #3) 300-30 MG per tablet, Take 1 tablet by mouth Every 6 (Six) Hours As Needed for moderate pain (4-6)., Disp: , Rfl:   •  carvedilol (COREG) 12.5 MG tablet, Take 1 tablet by mouth 2 (Two) Times a Day With Meals. (Patient not taking: Reported on 6/2/2017), Disp: 60 tablet, Rfl: 0  •  colchicine 0.6 MG tablet, Take 0.6 mg by mouth Daily., Disp: , Rfl:   •  diltiaZEM CD (CARDIZEM CD) 120 MG 24 hr capsule, Take 1 capsule by mouth Daily., Disp: 30 capsule, Rfl: 0  •  isosorbide mononitrate (IMDUR) 30 MG 24 hr tablet, Take 30 mg by mouth Daily., Disp: , Rfl:   •  Misc Natural Products (BLACK CHERRY CONCENTRATE PO), Take 1 capsule by mouth Daily. For gout (1 capsule = 500 mg), Disp: , Rfl:   •  ranolazine (RANEXA) 500 MG 12 hr tablet, Take 500 mg by mouth 2  (Two) Times a Day., Disp: , Rfl:   •  sucralfate (CARAFATE) 1 G tablet, Take 1 g by mouth 3 (Three) Times a Day. For 14 days., Disp: , Rfl:   •  warfarin (COUMADIN) 2.5 MG tablet, Take one tablet daily except on Monday, take two tablets or as Directed by Coumadin Clinic, Disp: 100 tablet, Rfl: 1    ALLERGIES:  Flomax [tamsulosin hcl] and Motrin [ibuprofen]    REVIEW OF SYSTEMS  Review of Systems   Constitutional: Positive for appetite change (throws up most of his food for the past month) and fatigue. Negative for chills, diaphoresis, fever and unexpected weight change.   HENT: Positive for hearing loss, sore throat and tinnitus. Negative for congestion, rhinorrhea, sinus pressure and sneezing.    Eyes: Positive for itching. Negative for pain and redness.   Respiratory: Positive for cough (non productive) and shortness of breath. Negative for chest tightness.    Cardiovascular: Positive for leg swelling. Negative for chest pain.   Gastrointestinal: Positive for constipation (no bowel movement in 5 days), nausea and vomiting (mainly dry heaves). Negative for abdominal pain, blood in stool and diarrhea.   Endocrine: Negative.    Genitourinary: Negative for dysuria and hematuria.   Skin: Positive for wound (coccyx pressure deterioration). Negative for rash.   Neurological: Positive for weakness. Negative for dizziness, syncope, light-headedness and numbness.   Psychiatric/Behavioral: Positive for confusion and hallucinations (for the past month). Negative for suicidal ideas.       PHYSICAL EXAM:  Temp:  [97.3 °F (36.3 °C)] 97.3 °F (36.3 °C)  Heart Rate:  [] 128  Resp:  [18] 18  BP: (103-124)/(66-79) 123/79  Body mass index is 21.18 kg/(m^2).  Physical Exam   Constitutional: He is oriented to person, place, and time. He appears cachectic. He is active.   HENT:   Head: Normocephalic and atraumatic.   Eyes: No scleral icterus.   Cardiovascular: An irregularly irregular rhythm present. Tachycardia present.  Exam  reveals no gallop and no friction rub.    No murmur heard.  Pulses:       Carotid pulses are 3+ on the right side       Radial pulses are 2+ on the right side, and 2+ on the left side.        Dorsalis pedis pulses are 1+ on the right side, and 1+ on the left side.   Pulmonary/Chest: Effort normal and breath sounds normal. No respiratory distress. He has no wheezes. He has no rales. He exhibits no tenderness.   Abdominal: Soft. Bowel sounds are normal. He exhibits no distension and no mass. There is no tenderness. There is no rebound and no guarding. No hernia.   Neurological: He is alert and oriented to person, place, and time.       DIAGNOSTIC DATA:   Lab Results (last 24 hours)     Procedure Component Value Units Date/Time    Magnesium [[id number]]  (Normal) Collected:  06/02/17 1144    Specimen:  Blood Updated:  06/02/17 1248     Magnesium 2.2 mg/dL     Amylase [[id number]]  (Normal) Collected:  06/02/17 1144    Specimen:  Blood Updated:  06/02/17 1248     Amylase 56 U/L     Lipase [[id number]]  (Normal) Collected:  06/02/17 1144    Specimen:  Blood Updated:  06/02/17 1248     Lipase 65 U/L     CK [[id number]]  (Abnormal) Collected:  06/02/17 1144    Specimen:  Blood Updated:  06/02/17 1248     Creatine Kinase 233 (H) U/L     Comprehensive Metabolic Panel [[id number]]  (Abnormal) Collected:  06/02/17 1144    Specimen:  Blood Updated:  06/02/17 1252     Glucose 79 mg/dL      BUN 41 (H) mg/dL      Creatinine 4.44 (H) mg/dL      Sodium 131 (L) mmol/L      Potassium 4.3 mmol/L      Chloride 89 (L) mmol/L      CO2 26.0 mmol/L      Calcium 9.1 mg/dL      Total Protein 6.3 g/dL      Albumin 3.50 g/dL      ALT (SGPT) 118 (H) U/L      AST (SGOT) 179 (H) U/L      Alkaline Phosphatase 93 U/L      Total Bilirubin 4.1 (H) mg/dL      eGFR Non African Amer 13 (L) mL/min/1.73      Globulin 2.8 gm/dL      A/G Ratio 1.3 g/dL      BUN/Creatinine Ratio 9.2     Anion Gap 16.0 (H) mmol/L     Narrative:       The MDRD GFR formula is only  valid for adults with stable renal function between ages 18 and 70.    CBC Auto Differential [[id number]]  (Abnormal) Collected:  06/02/17 1144    Specimen:  Blood Updated:  06/02/17 1253     WBC 5.64 10*3/mm3      RBC 4.22 (L) 10*6/mm3      Hemoglobin 13.2 (L) g/dL      Hematocrit 39.6 %      MCV 93.8 fL      MCH 31.3 pg      MCHC 33.3 g/dL      RDW 23.1 (H) %      RDW-SD 77.1 (H) fl      MPV -- fL       INSTRUMENT UNABLE TO CALCULATE RESULT        Platelets 125 (L) 10*3/mm3      Neutrophil % 77.0 %      Lymphocyte % 11.7 %      Monocyte % 9.6 %      Eosinophil % 0.5 %      Basophil % 0.5 %      Immature Grans % 0.7 (H) %      Neutrophils, Absolute 4.34 10*3/mm3      Lymphocytes, Absolute 0.66 10*3/mm3      Monocytes, Absolute 0.54 10*3/mm3      Eosinophils, Absolute 0.03 10*3/mm3      Basophils, Absolute 0.03 10*3/mm3      Immature Grans, Absolute 0.04 (H) 10*3/mm3      nRBC 0.0 /100 WBC     CK-MB [[id number]]  (Normal) Collected:  06/02/17 1144    Specimen:  Blood Updated:  06/02/17 1258     CKMB 3.34 ng/mL     Troponin [[id number]]  (Abnormal) Collected:  06/02/17 1144    Specimen:  Blood Updated:  06/02/17 1301     Troponin I 0.404 (C) ng/mL     TSH [[id number]]  (Abnormal) Collected:  06/02/17 1144    Specimen:  Blood Updated:  06/02/17 1317     TSH 6.320 (H) mIU/mL     Protime-INR [[id number]]  (Abnormal) Collected:  06/02/17 1242    Specimen:  Blood Updated:  06/02/17 1323     Protime >120.0 (H) Seconds      INR >18.00 (C)      Results confirmed by recollection       Narrative:       Therapeutic range for most indications is 2.0-3.0 INR,  or 2.5-3.5 for mechanical heart valves.    Extra Tubes [[id number]] Collected:  06/02/17 1243    Specimen:  Blood from Blood, Venous Line Updated:  06/02/17 1401    Narrative:       The following orders were created for panel order Extra Tubes.  Procedure                               Abnormality         Status                     ---------                                -----------         ------                     Green Top (Gel)[[id number]]                                  Final result                 Please view results for these tests on the individual orders.    Green Top (Gel) [[id number]] Collected:  06/02/17 1243    Specimen:  Blood Updated:  06/02/17 1401     Extra Tube Hold for add-ons.      Auto resulted.       CBC & Differential [[id number]] Collected:  06/02/17 1144    Specimen:  Blood Updated:  06/02/17 1434    Narrative:       The following orders were created for panel order CBC & Differential.  Procedure                               Abnormality         Status                     ---------                               -----------         ------                     Scan Slide[[id number]]                                       Final result               CBC Auto Differential[[id number]]        Abnormal            Final result                 Please view results for these tests on the individual orders.    Scan Slide [[id number]] Collected:  06/02/17 1144    Specimen:  Blood Updated:  06/02/17 1434     Anisocytosis Large/3+     Dacrocytes Mod/2+     Polychromasia Slight/1+     Schistocytes Slight/1+     WBC Morphology Normal     Platelet Estimate Decreased     Clumped Platelets --      NONE SEEN       Protime-INR [[id number]]  (Abnormal) Collected:  06/02/17 1544    Specimen:  Blood Updated:  06/02/17 1705     Protime >120.0 (H) Seconds      INR >13.00 (C)    Narrative:       Therapeutic range for most indications is 2.0-3.0 INR,  or 2.5-3.5 for mechanical heart valves.           Imaging Results (last 24 hours)     Procedure Component Value Units Date/Time    XR Chest 1 View [[id number]] Collected:  06/02/17 1235     Updated:  06/02/17 1252    Narrative:         EXAM:         Radiograph(s), Chest   VIEWS:   Portable ; 1       DATE/TIME: 6/2/2017 12:49 PM CDT               INDICATION:     Weakness      COMPARISON:   CXR: 3/13/17          FINDINGS:           - lines/tubes:      Right approach large caliber central venous  catheter in standard position.     - cardiac:          size within normal limits         - mediastinum:  contour within normal limits         - lungs:          no focal air space process, pulmonary  interstitial edema, nodule(s)/mass             - pleura:          Tiny left pleural fluid collection of doubtful  clinical significance.                  - osseous:          Status post CABG                  - misc.:        Impression:       CONCLUSION:        1. No evidence of an acute cardiopulmonary process.                      Electronically signed by:  SAROJ Ortega MD  6/2/2017 12:52  PM CDT Workstation: [id number]          I reviewed the patient's new clinical results.    ASSESSMENT AND PLAN: This is a 83 y.o. male with:    Active Hospital Problems (** Indicates Principal Problem)    Diagnosis Date Noted   • Coumadin toxicity [T45.511A] 06/02/2017     -Will Hold coumadin  -Vitamin K protocol  -Daily INR  -INR on admission >18     • Elevated liver enzymes [R74.8] 06/02/2017     -f/u RUQ U/S  -Dr. Hoang consulted, agreed to see patient  -MRCP tomorrow morning       • Cholelithiases [K80.20] 06/02/2017     -seen on CT 2 weeks ago  -RUQ ultrasound     • Chronic atrial fibrillation [I48.2] 06/02/2017     -continue home meds     • ESRD (end stage renal disease) on dialysis [N18.6, Z99.2] 06/02/2017     Dr. Holguin Consulted will dialyze patient tomorrow  MWF dialysis normally, missed dialysis today     • PAF (paroxysmal atrial fibrillation) [I48.0] 03/09/2017     Continue home medications        Resolved Hospital Problems    Diagnosis Date Noted Date Resolved   No resolved problems to display.       DVT prophylaxis: SCD/RAYRAY  Code status is Prior DNI  BOAZ # [id number], reviewed and consistent with patient reported medications.      I discussed the patients findings and my recommendations with patient and family.     Dr. Hernandez is the attending on record at time of  admission, he is aware of the patient's status and agrees with the above history and physical.          This document has been electronically signed by Paulina Samuel MD on June 2, 2017 6:45 PM      I have examined the patient and reviewed the pertinent diagnostic data. I have discussed the case with the Family Medicine Resident and agree with the assessment and plan of care.    Home Hernandez DO           This document has been electronically signed by Home Hernandez DO on Nena 3, 2017 2:04 PM

## 2017-06-02 NOTE — ED PROVIDER NOTES
Subjective   HPI Comments: Progressive generalized weakness for the past week. Decreased urine output and appetite. Pt states that everytime he eats, he vomits.   CABG 8 years ago. Dialysis x 2 months with Dr. Holguin.    Patient is a 83 y.o. male presenting with weakness.   Weakness - Generalized   Severity:  Moderate  Onset quality:  Gradual  Duration:  1 week  Timing:  Constant  Progression:  Worsening  Chronicity:  New  Relieved by:  Nothing  Worsened by:  Nothing  Ineffective treatments:  None tried  Associated symptoms: nausea, shortness of breath and vomiting    Associated symptoms: no abdominal pain, no chest pain, no cough, no diarrhea, no dizziness, no dysuria, no fever, no frequency, no headaches, no myalgias, no near-syncope, no seizures and no urgency        Review of Systems   Constitutional: Negative for appetite change, chills, diaphoresis, fatigue and fever.   HENT: Negative for congestion, ear discharge, ear pain, nosebleeds, rhinorrhea, sinus pressure, sore throat and trouble swallowing.    Eyes: Negative for discharge and redness.   Respiratory: Positive for shortness of breath. Negative for apnea, cough, chest tightness and wheezing.    Cardiovascular: Negative for chest pain and near-syncope.   Gastrointestinal: Positive for nausea and vomiting. Negative for abdominal pain and diarrhea.   Endocrine: Negative for polyuria.   Genitourinary: Negative for dysuria, frequency and urgency.   Musculoskeletal: Negative for myalgias and neck pain.   Skin: Negative for color change and rash.   Allergic/Immunologic: Negative for immunocompromised state.   Neurological: Negative for dizziness, seizures, syncope, weakness, light-headedness and headaches.   Hematological: Negative for adenopathy. Does not bruise/bleed easily.   Psychiatric/Behavioral: Negative for behavioral problems and confusion.   All other systems reviewed and are negative.      Past Medical History:   Diagnosis Date   • A-fib    • Bleeding  ulcer     approx 9 years ago prior to by pass surgery   • BPH (benign prostatic hyperplasia)    • Coagulopathy     coumadin   • Coronary artery disease     pt relates has been told in past has leaky valve   • Hearing loss    • History of echocardiogram 07/16/2013    flow 60452 (1)    Biatrial enlargement with mild CLVH with normal aortic root size. LV systolic function well preserved with EF of 55-60%. Mitral and AV thickened. Tricuspid valve intact. Diastolic dysfunction.    • Hypertension     since dialysis has placed some of bp meds on hold   • Kidney disease    • Kidney failure    • Macular degeneration    • Macular degeneration    • Nausea & vomiting    • Stroke     mini   • Wears glasses        Allergies   Allergen Reactions   • Flomax [Tamsulosin Hcl]    • Motrin [Ibuprofen]        Past Surgical History:   Procedure Laterality Date   • ARTERIOVENOUS FISTULA/SHUNT SURGERY Left 5/2/2017    Procedure: LEFT BRACHIAL ARTERY TO AXILLARY VEIN  ARTERIOVENOUS GRAFT     (artegraft);  Surgeon: Jovanni Esparza MD;  Location: [facility] OR;  Service:    • BACK SURGERY     • CAROTID ENDARTERECTOMY     • CORONARY ARTERY BYPASS GRAFT     • EYE SURGERY     • FOREIGN BODY REMOVAL Left     foot   • HERNIA REPAIR      ingunial right and left   • INTERVENTIONAL RADIOLOGY PROCEDURE N/A 3/14/2017    Procedure: tunneled central venous catheter placement;  Surgeon: Jovanni Esparza MD;  Location: [facility];  Service:        Family History   Problem Relation Age of Onset   • Heart disease Father    • Alcohol abuse Son        Social History     Social History   • Marital status:      Spouse name: N/A   • Number of children: N/A   • Years of education: N/A     Social History Main Topics   • Smoking status: Former Smoker     Quit date: 2/5/1970   • Smokeless tobacco: Never Used   • Alcohol use No   • Drug use: No      Comment: prescrition   • Sexual activity: Defer     Other Topics Concern   • None      Social History Narrative           Objective   Physical Exam   Constitutional: He is oriented to person, place, and time. He appears well-developed and well-nourished.   HENT:   Head: Normocephalic and atraumatic.   Nose: Nose normal.   Mouth/Throat: Oropharynx is clear and moist.   Eyes: Conjunctivae and EOM are normal. Pupils are equal, round, and reactive to light. Right eye exhibits no discharge. Left eye exhibits no discharge. No scleral icterus.   Neck: Normal range of motion. Neck supple. No tracheal deviation present.   Cardiovascular: Normal rate, regular rhythm and normal heart sounds.    No murmur heard.  Pulmonary/Chest: Effort normal and breath sounds normal. No stridor. No respiratory distress. He has no wheezes. He has no rales.   Abdominal: Soft. Bowel sounds are normal. He exhibits no distension and no mass. There is no tenderness. There is no rebound and no guarding.   Musculoskeletal: He exhibits no edema.   Neurological: He is alert and oriented to person, place, and time. Coordination normal.   Skin: Skin is warm and dry. No rash noted. No erythema.   jaundice   Psychiatric: He has a normal mood and affect. His behavior is normal. Thought content normal.   Nursing note and vitals reviewed.      ECG 12 Lead    Date/Time: 6/2/2017 3:11 PM  Performed by: ABILIO RICO  Authorized by: ABILIO RICO   Interpreted by physician  Rhythm: atrial fibrillation  Rate: tachycardic  BPM: 130  ST Segments: ST segments normal                 ED Course  ED Course          Labs Reviewed   COMPREHENSIVE METABOLIC PANEL - Abnormal; Notable for the following:        Result Value    BUN 41 (*)     Creatinine 4.44 (*)     Sodium 131 (*)     Chloride 89 (*)     ALT (SGPT) 118 (*)     AST (SGOT) 179 (*)     Total Bilirubin 4.1 (*)     eGFR Non  Amer 13 (*)     Anion Gap 16.0 (*)     All other components within normal limits    Narrative:     The MDRD GFR formula is only valid for adults with  stable renal function between ages 18 and 70.   PROTIME-INR - Abnormal; Notable for the following:     Protime >120.0 (*)     INR >18.00 (*)     All other components within normal limits    Narrative:     Therapeutic range for most indications is 2.0-3.0 INR,  or 2.5-3.5 for mechanical heart valves.   CK - Abnormal; Notable for the following:     Creatine Kinase 233 (*)     All other components within normal limits   TROPONIN (IN-HOUSE) - Abnormal; Notable for the following:     Troponin I 0.404 (*)     All other components within normal limits   TSH - Abnormal; Notable for the following:     TSH 6.320 (*)     All other components within normal limits   CBC WITH AUTO DIFFERENTIAL - Abnormal; Notable for the following:     RBC 4.22 (*)     Hemoglobin 13.2 (*)     RDW 23.1 (*)     RDW-SD 77.1 (*)     Platelets 125 (*)     Immature Grans % 0.7 (*)     Immature Grans, Absolute 0.04 (*)     All other components within normal limits   AMYLASE - Normal   LIPASE - Normal   CK MB - Normal   MAGNESIUM - Normal   SCAN SLIDE   URINALYSIS W/ CULTURE IF INDICATED   PROTIME-INR   PROTIME-INR   POCT PROTIME - INR   CBC AND DIFFERENTIAL    Narrative:     The following orders were created for panel order CBC & Differential.  Procedure                               Abnormality         Status                     ---------                               -----------         ------                     Scan Slide[[id number]]                                       Final result               CBC Auto Differential[[id number]]        Abnormal            Final result                 Please view results for these tests on the individual orders.   EXTRA TUBES    Narrative:     The following orders were created for panel order Extra Tubes.  Procedure                               Abnormality         Status                     ---------                               -----------         ------                     Green Top (Gel)[[id number]]                                   Final result                 Please view results for these tests on the individual orders.   GREEN TOP       XR Chest 1 View   Final Result   CONCLUSION:          1. No evidence of an acute cardiopulmonary process.                           Electronically signed by:  SAROJ Ortega MD  6/2/2017 12:52   PM CDT Workstation: [id number]                    [facility]    Final diagnoses:   Coumadin toxicity, accidental or unintentional, initial encounter   Hyperbilirubinemia   NSTEMI (non-ST elevated myocardial infarction)            Chivo Fernandez MD  06/02/17 [id number]

## 2017-06-02 NOTE — ED NOTES
Report given to Rosalva STOVALL on floor who states no additional questions at this time. Resps even and unlabored.  Pt. Remains tachycardiac at a rate of 126bpm.  Vitamin K continued to floor and RN made aware of the need for Kcentra to infuse afterward.  No other needs voiced at this time. Pt. Ready for transport.      Sully Diaz RN  06/02/17 3261

## 2017-06-02 NOTE — ED NOTES
Pt. Assisted in bed for comfort at this time.  Resps even and unlabored.  VSS.  NAD.  Pt. Alert and oriented x4.  No other needs voiced at this time will continue to monitor.      Sully Diaz RN  06/02/17 7792

## 2017-06-02 NOTE — ED NOTES
Spoke with Radha from Dialysis who states that she spoke with Dr. Holguin and after reviewing pts. Lab results it has been decided that pt. Will received Dialysis first thing in the morning.       Sully Diaz RN  06/02/17 1418       Sully Diaz RN  06/02/17 141

## 2017-06-02 NOTE — ED NOTES
Spoke with Radha from dialysis who states pts. Will be last on her list today but she called to verify labs had been ordered. Also spoke with Dr. Fernandez who is in agreeance with waiting for urine anaylsis due to PT and INR elevation and pt. Does not produce much urine.      Sully Diaz RN  06/02/17 3486

## 2017-06-02 NOTE — H&P
"    HISTORY AND PHYSICAL  NAME: Roni Middleton  : 1933  MRN: [id number]    DATE OF ADMISSION: 17    DATE & TIME SEEN: 17 5:38 PM    PCP: Dago Lopez MD    CODE STATUS: Full    CHIEF COMPLAINT fatigue, anorexia, listlessness for past few days.    HPI:      Roni Middleton is a 83 y.o. male who presents with several days of \"feeling bad\". He is not eating and has very little energy. He spends most of his time sleeping. He cannot support his own weight.    CONCURRENT MEDICAL HISTORY:  Past Medical History:   Diagnosis Date   • A-fib    • Bleeding ulcer     approx 9 years ago prior to by pass surgery   • BPH (benign prostatic hyperplasia)    • Coagulopathy     coumadin   • Coronary artery disease     pt relates has been told in past has leaky valve   • Hearing loss    • History of echocardiogram 2013    flow 04908 (1)    Biatrial enlargement with mild CLVH with normal aortic root size. LV systolic function well preserved with EF of 55-60%. Mitral and AV thickened. Tricuspid valve intact. Diastolic dysfunction.    • Hypertension     since dialysis has placed some of bp meds on hold   • Kidney disease    • Kidney failure    • Macular degeneration    • Macular degeneration    • Nausea & vomiting    • Stroke     mini   • Wears glasses        PAST SURGICAL HISTORY:  Past Surgical History:   Procedure Laterality Date   • ARTERIOVENOUS FISTULA/SHUNT SURGERY Left 2017    Procedure: LEFT BRACHIAL ARTERY TO AXILLARY VEIN  ARTERIOVENOUS GRAFT     (artegraft);  Surgeon: Jovanni Esparza MD;  Location: [facility] OR;  Service:    • BACK SURGERY     • CAROTID ENDARTERECTOMY     • CORONARY ARTERY BYPASS GRAFT     • EYE SURGERY     • FOREIGN BODY REMOVAL Left     foot   • HERNIA REPAIR      ingunial right and left   • INTERVENTIONAL RADIOLOGY PROCEDURE N/A 3/14/2017    Procedure: tunneled central venous catheter placement;  Surgeon: Jovanni Esparza MD;  Location: [facility] " INVASIVE LOCATION;  Service:        FAMILY HISTORY:  Family History   Problem Relation Age of Onset   • Heart disease Father    • Alcohol abuse Son         SOCIAL HISTORY:  Social History     Social History   • Marital status:      Spouse name: N/A   • Number of children: N/A   • Years of education: N/A     Occupational History   • Not on file.     Social History Main Topics   • Smoking status: Former Smoker     Quit date: 2/5/1970   • Smokeless tobacco: Never Used   • Alcohol use No   • Drug use: No      Comment: prescrition   • Sexual activity: Defer     Other Topics Concern   • Not on file     Social History Narrative       HOME MEDICATIONS:    Current Facility-Administered Medications:   •  prothrombin complex conc human (KCentra) IV solution 3,826 Units, 3,826 Units, Intravenous, Once **AND** phytonadione (AQUA-MEPHYTON, VITAMIN K) 10 mg in sodium chloride 0.9 % 50 mL IVPB, 10 mg, Intravenous, Once **AND** Protime-INR, , , Once **AND** Protime-INR, , , Once **AND** [START ON 6/3/2017] Protime-INR, , , Once **AND** Monitor for signs for thromboembolic events, , , Continuous, Chivo Fernandez MD  •  sodium chloride 0.9 % infusion, 125 mL/hr, Intravenous, Continuous, Chivo Fernandez MD, Last Rate: 125 mL/hr at 06/02/17 1319, 125 mL/hr at 06/02/17 1319    Current Outpatient Prescriptions:   •  acetaminophen-codeine (TYLENOL #3) 300-30 MG per tablet, Take 1 tablet by mouth Every 6 (Six) Hours As Needed for moderate pain (4-6)., Disp: , Rfl:   •  carvedilol (COREG) 12.5 MG tablet, Take 1 tablet by mouth 2 (Two) Times a Day With Meals. (Patient not taking: Reported on 6/2/2017), Disp: 60 tablet, Rfl: 0  •  colchicine 0.6 MG tablet, Take 0.6 mg by mouth Daily., Disp: , Rfl:   •  diltiaZEM CD (CARDIZEM CD) 120 MG 24 hr capsule, Take 1 capsule by mouth Daily., Disp: 30 capsule, Rfl: 0  •  isosorbide mononitrate (IMDUR) 30 MG 24 hr tablet, Take 30 mg by mouth Daily., Disp: , Rfl:   •  Misc Natural Products  (BLACK CHERRY CONCENTRATE PO), Take 1 capsule by mouth Daily. For gout (1 capsule = 500 mg), Disp: , Rfl:   •  ranolazine (RANEXA) 500 MG 12 hr tablet, Take 500 mg by mouth 2 (Two) Times a Day., Disp: , Rfl:   •  sucralfate (CARAFATE) 1 G tablet, Take 1 g by mouth 3 (Three) Times a Day. For 14 days., Disp: , Rfl:   •  warfarin (COUMADIN) 2.5 MG tablet, Take one tablet daily except on Monday, take two tablets or as Directed by [facility], Disp: 100 tablet, Rfl: 1    ALLERGIES:  Flomax [tamsulosin hcl] and Motrin [ibuprofen]    REVIEW OF SYSTEMS  Review of Systems   Constitutional: Positive for appetite change and fatigue. Negative for activity change, chills and fever.   HENT: Negative for ear pain and sore throat.    Eyes: Negative for pain and visual disturbance.   Respiratory: Positive for shortness of breath. Negative for cough.    Cardiovascular: Positive for chest pain. Negative for palpitations.   Gastrointestinal: Negative for abdominal pain and nausea.   Endocrine: Negative for cold intolerance and heat intolerance.   Genitourinary: Negative for difficulty urinating and dysuria.   Musculoskeletal: Negative for arthralgias and gait problem.   Skin: Negative for color change and rash.   Neurological: Negative for dizziness, weakness and headaches.   Hematological: Negative for adenopathy. Does not bruise/bleed easily.   Psychiatric/Behavioral: Negative for agitation, confusion and sleep disturbance.       PHYSICAL EXAM:  Temp:  [97.3 °F (36.3 °C)] 97.3 °F (36.3 °C)  Heart Rate:  [] 127  Resp:  [18] 18  BP: (103-124)/(66-78) 124/78  Body mass index is 21.18 kg/(m^2).  Physical Exam   Constitutional: He is oriented to person, place, and time. He appears well-developed and well-nourished. He appears cachectic. He has a sickly appearance. He appears ill.   HENT:   Head: Normocephalic and atraumatic.   Right Ear: External ear normal.   Left Ear: External ear normal.   Nose: Nose normal.   Mouth/Throat:  Oropharynx is clear and moist.   Eyes: Conjunctivae and EOM are normal. Pupils are equal, round, and reactive to light. Right eye exhibits no discharge. Left eye exhibits no discharge. No scleral icterus.   Neck: Normal range of motion. Neck supple. No JVD present. No tracheal deviation present. No thyromegaly present.   Cardiovascular: Normal heart sounds and intact distal pulses.  An irregularly irregular rhythm present. Tachycardia present.  Exam reveals no gallop and no friction rub.    No murmur heard.  Bilateral pitting edema in lower extremities.   Pulmonary/Chest: Effort normal and breath sounds normal. No stridor. No respiratory distress. He has no wheezes. He has no rales. He exhibits no tenderness.   Abdominal: Soft. Bowel sounds are normal. He exhibits no distension and no mass. There is no tenderness. There is no rebound and no guarding. No hernia.   Musculoskeletal: Normal range of motion. He exhibits no edema or deformity.   Lymphadenopathy:     He has no cervical adenopathy.   Neurological: He is alert and oriented to person, place, and time. No sensory deficit. He exhibits normal muscle tone. Coordination normal.   Skin: Skin is warm and dry. No erythema.   Psychiatric: He has a normal mood and affect. His behavior is normal. Judgment and thought content normal.   Nursing note and vitals reviewed.      DIAGNOSTIC DATA:   Lab Results (last 24 hours)     Procedure Component Value Units Date/Time    Magnesium [[id number]]  (Normal) Collected:  06/02/17 1144    Specimen:  Blood Updated:  06/02/17 1248     Magnesium 2.2 mg/dL     Amylase [[id number]]  (Normal) Collected:  06/02/17 1144    Specimen:  Blood Updated:  06/02/17 1248     Amylase 56 U/L     Lipase [[id number]]  (Normal) Collected:  06/02/17 1144    Specimen:  Blood Updated:  06/02/17 1248     Lipase 65 U/L     CK [[id number]]  (Abnormal) Collected:  06/02/17 1144    Specimen:  Blood Updated:  06/02/17 1248     Creatine Kinase 233 (H) U/L      Comprehensive Metabolic Panel [[id number]]  (Abnormal) Collected:  06/02/17 1144    Specimen:  Blood Updated:  06/02/17 1252     Glucose 79 mg/dL      BUN 41 (H) mg/dL      Creatinine 4.44 (H) mg/dL      Sodium 131 (L) mmol/L      Potassium 4.3 mmol/L      Chloride 89 (L) mmol/L      CO2 26.0 mmol/L      Calcium 9.1 mg/dL      Total Protein 6.3 g/dL      Albumin 3.50 g/dL      ALT (SGPT) 118 (H) U/L      AST (SGOT) 179 (H) U/L      Alkaline Phosphatase 93 U/L      Total Bilirubin 4.1 (H) mg/dL      eGFR Non African Amer 13 (L) mL/min/1.73      Globulin 2.8 gm/dL      A/G Ratio 1.3 g/dL      BUN/Creatinine Ratio 9.2     Anion Gap 16.0 (H) mmol/L     Narrative:       The MDRD GFR formula is only valid for adults with stable renal function between ages 18 and 70.    CBC Auto Differential [[id number]]  (Abnormal) Collected:  06/02/17 1144    Specimen:  Blood Updated:  06/02/17 1253     WBC 5.64 10*3/mm3      RBC 4.22 (L) 10*6/mm3      Hemoglobin 13.2 (L) g/dL      Hematocrit 39.6 %      MCV 93.8 fL      MCH 31.3 pg      MCHC 33.3 g/dL      RDW 23.1 (H) %      RDW-SD 77.1 (H) fl      MPV -- fL       INSTRUMENT UNABLE TO CALCULATE RESULT        Platelets 125 (L) 10*3/mm3      Neutrophil % 77.0 %      Lymphocyte % 11.7 %      Monocyte % 9.6 %      Eosinophil % 0.5 %      Basophil % 0.5 %      Immature Grans % 0.7 (H) %      Neutrophils, Absolute 4.34 10*3/mm3      Lymphocytes, Absolute 0.66 10*3/mm3      Monocytes, Absolute 0.54 10*3/mm3      Eosinophils, Absolute 0.03 10*3/mm3      Basophils, Absolute 0.03 10*3/mm3      Immature Grans, Absolute 0.04 (H) 10*3/mm3      nRBC 0.0 /100 WBC     CK-MB [[id number]]  (Normal) Collected:  06/02/17 1144    Specimen:  Blood Updated:  06/02/17 1258     CKMB 3.34 ng/mL     Troponin [[id number]]  (Abnormal) Collected:  06/02/17 1144    Specimen:  Blood Updated:  06/02/17 1301     Troponin I 0.404 (C) ng/mL     TSH [[id number]]  (Abnormal) Collected:  06/02/17 1144    Specimen:  Blood  Updated:  06/02/17 1317     TSH 6.320 (H) mIU/mL     Protime-INR [[id number]]  (Abnormal) Collected:  06/02/17 1242    Specimen:  Blood Updated:  06/02/17 1323     Protime >120.0 (H) Seconds      INR >18.00 (C)      Results confirmed by recollection       Narrative:       Therapeutic range for most indications is 2.0-3.0 INR,  or 2.5-3.5 for mechanical heart valves.    Extra Tubes [[id number]] Collected:  06/02/17 1243    Specimen:  Blood from Blood, Venous Line Updated:  06/02/17 1401    Narrative:       The following orders were created for panel order Extra Tubes.  Procedure                               Abnormality         Status                     ---------                               -----------         ------                     Green Top (Gel)[[id number]]                                  Final result                 Please view results for these tests on the individual orders.    Green Top (Gel) [[id number]] Collected:  06/02/17 1243    Specimen:  Blood Updated:  06/02/17 1401     Extra Tube Hold for add-ons.      Auto resulted.       CBC & Differential [[id number]] Collected:  06/02/17 1144    Specimen:  Blood Updated:  06/02/17 1434    Narrative:       The following orders were created for panel order CBC & Differential.  Procedure                               Abnormality         Status                     ---------                               -----------         ------                     Scan Slide[[id number]]                                       Final result               CBC Auto Differential[[id number]]        Abnormal            Final result                 Please view results for these tests on the individual orders.    Scan Slide [[id number]] Collected:  06/02/17 1144    Specimen:  Blood Updated:  06/02/17 1434     Anisocytosis Large/3+     Dacrocytes Mod/2+     Polychromasia Slight/1+     Schistocytes Slight/1+     WBC Morphology Normal     Platelet Estimate Decreased     Clumped Platelets --       NONE SEEN       Protime-INR [[id number]]  (Abnormal) Collected:  06/02/17 1544    Specimen:  Blood Updated:  06/02/17 1705     Protime >120.0 (H) Seconds      INR >13.00 (C)    Narrative:       Therapeutic range for most indications is 2.0-3.0 INR,  or 2.5-3.5 for mechanical heart valves.           Imaging Results (last 24 hours)     Procedure Component Value Units Date/Time    XR Chest 1 View [[id number]] Collected:  06/02/17 1235     Updated:  06/02/17 1252    Narrative:         EXAM:         Radiograph(s), Chest   VIEWS:   Portable ; 1       DATE/TIME: 6/2/2017 12:49 PM CDT               INDICATION:     Weakness      COMPARISON:   CXR: 3/13/17          FINDINGS:           - lines/tubes:     Right approach large caliber central venous  catheter in standard position.     - cardiac:          size within normal limits         - mediastinum:  contour within normal limits         - lungs:          no focal air space process, pulmonary  interstitial edema, nodule(s)/mass             - pleura:          Tiny left pleural fluid collection of doubtful  clinical significance.                  - osseous:          Status post CABG                  - misc.:        Impression:       CONCLUSION:        1. No evidence of an acute cardiopulmonary process.                      Electronically signed by:  SAROJ Ortega MD  6/2/2017 12:52  PM CDT Workstation: [id number]          I reviewed the patient's new clinical results.  I reviewed the patient's new imaging results and agree with the interpretation.  I reviewed the patient's other test results and agree with the interpretation    ASSESSMENT AND PLAN: This is a 83 y.o. male with:    Active Hospital Problems (** Indicates Principal Problem)    Diagnosis Date Noted   • Coumadin toxicity [T45.511A] 06/02/2017   • Elevated liver enzymes [R74.8] 06/02/2017   • Cholelithiases [K80.20] 06/02/2017   • Chronic atrial fibrillation [I48.2] 06/02/2017      Resolved Hospital Problems     Diagnosis Date Noted Date Resolved   No resolved problems to display.       Vitamin K for hypocoag state secondary to Coumadin toxicity  Monitor coags  Monitor transaminases and bili and consult GI  Rate control for a fib and consult cardio prn.    I have examined the patient and reviewed the pertinent diagnostic data. I have discussed the case with the Family Medicine Resident and agree with the assessment and plan of care.    Home Hernandez DO           This document has been electronically signed by Home Hernandez DO on June 2, 2017 5:40 PM

## 2017-06-02 NOTE — ED NOTES
Pharmacy to send medications prior to pt. Leaving ED.  Spoke with pharmacy [facility] who states she is checking the medication then will send them     Sully Diaz RN  06/02/17 8288

## 2017-06-02 NOTE — PROGRESS NOTES
Nephrology note:    I have briefly seen patient in treatment room D in ER.   History of ESRD, recently started on HD  Came to ER with increased fatigue, weakness and inability to ambulate  Recent persistent nausea and vomiting - had GI evaluation with Dr. Hoang  Pt did not want to pursue endoscopy.  Imaging showed gall stones  History of afib, anticogulation - had appt to see Dr. Jenkins today  He was supposed to see Dr. Lopez today for gen weakness and evaluation for wheelchair    In ER - HR is 124, afib.  BP stable.  Chest clear.  HS irregular  Has edema of LL   No sig pulm congestion    I have reviewed the labs and CXR later.   K is stable.  INR is very high.  CXR with no pulm congestion  Patient was planned for outpatient HD tomorrow (he is on MWF HD, but had cardiology and PCP appts today, so dialysis was planned for tomorrow)  HR is up at 124 and needs rate control.   Also with high INR with risks of bleeding  Will plan HD tomorrow.    Discussed with ER.       Ezio Sweeney MD

## 2017-06-03 NOTE — PROGRESS NOTES
FAMILY MEDICINE DAILY PROGRESS NOTE  NAME: [place]  : 1933  MRN: [id number]      LOS: 1 day     PROVIDER OF SERVICE: Paulina Samuel MD    Chief Complaint: Coumadin toxicity    Subjective:     Interval History:  History taken from: patient RN Per the nurse the patient had no problems last night except that he has a stage 2 ulcer on his sacrum.  Patient Complaints: He complains of sacral pain and pain in the heels.  Patient also endorses vomiting yesterday.  Patient Denies:  Nausea, diarrhea, constipation, chest pain or cough.     Review of Systems:   Review of Systems   Constitutional: Positive for appetite change. Negative for diaphoresis, fatigue and fever.   HENT: Negative for congestion, postnasal drip, rhinorrhea, sinus pressure, sneezing, sore throat, tinnitus and voice change.    Eyes: Negative for pain, redness and itching.   Respiratory: Negative for apnea, cough and wheezing.    Gastrointestinal: Negative for abdominal pain, constipation, diarrhea, nausea and vomiting.   Genitourinary: Negative for dysuria and hematuria.   Musculoskeletal: Negative for back pain and neck pain.   Skin: Positive for wound. Negative for rash.       Objective:     Vital Signs  Temp:  [96.1 °F (35.6 °C)-97.4 °F (36.3 °C)] 96.1 °F (35.6 °C)  Heart Rate:  [118-133] 118  Resp:  [18-20] 18  BP: (112-126)/(72-82) 126/77  Body mass index is 20.83 kg/(m^2).    Physical Exam  Physical Exam   Constitutional: He appears cachectic.   HENT:   Head: Normocephalic and atraumatic.   Cardiovascular: Normal rate, regular rhythm, normal heart sounds and intact distal pulses.  Exam reveals no gallop and no friction rub.    No murmur heard.  Pulmonary/Chest: Effort normal and breath sounds normal. No respiratory distress. He has no wheezes. He has no rales. He exhibits no tenderness.   Abdominal: Soft. Bowel sounds are normal. He exhibits no distension and no mass. There is no tenderness. There is no rebound and no  guarding. No hernia.   Neurological: He is alert.       Medication Review    Current Facility-Administered Medications:   •  acetaminophen-codeine (TYLENOL #3) 300-30 MG per tablet 1 tablet, 1 tablet, Oral, Q6H PRN, Paulina Samuel MD  •  albumin human 25 % IV SOLN 25 g, 25 g, Intravenous, PRN, Ezio Sweeney MD  •  carvedilol (COREG) tablet 12.5 mg, 12.5 mg, Oral, BID With Meals, Paulina Samuel MD  •  colchicine tablet 0.6 mg, 0.6 mg, Oral, Daily, Paulina Samuel MD  •  diltiaZEM CD (CARDIZEM CD) 24 hr capsule 120 mg, 120 mg, Oral, Daily, Paulina Samuel MD  •  heparin (porcine) injection 4,100 Units, 4,100 Units, Intracatheter, Daily PRN, Ezio Sweeney MD, 4,100 Units at 06/03/17 1040  •  isosorbide mononitrate (IMDUR) 24 hr tablet 30 mg, 30 mg, Oral, Daily, Paulina Samuel MD  •  ranolazine (RANEXA) 12 hr tablet 500 mg, 500 mg, Oral, BID, Paulina Samuel MD  •  sodium chloride 0.9 % flush 1-10 mL, 1-10 mL, Intravenous, PRN, Paulina Samuel MD     Diagnostic Data    Lab Results (last 24 hours)     Procedure Component Value Units Date/Time    Magnesium [[id number]]  (Normal) Collected:  06/02/17 1144    Specimen:  Blood Updated:  06/02/17 1248     Magnesium 2.2 mg/dL     Amylase [[id number]]  (Normal) Collected:  06/02/17 1144    Specimen:  Blood Updated:  06/02/17 1248     Amylase 56 U/L     Lipase [[id number]]  (Normal) Collected:  06/02/17 1144    Specimen:  Blood Updated:  06/02/17 1248     Lipase 65 U/L     CK [[id number]]  (Abnormal) Collected:  06/02/17 1144    Specimen:  Blood Updated:  06/02/17 1248     Creatine Kinase 233 (H) U/L     Comprehensive Metabolic Panel [[id number]]  (Abnormal) Collected:  06/02/17 1144    Specimen:  Blood Updated:  06/02/17 1252     Glucose 79 mg/dL      BUN 41 (H) mg/dL      Creatinine 4.44 (H) mg/dL      Sodium 131 (L) mmol/L      Potassium 4.3 mmol/L      Chloride 89 (L) mmol/L      CO2 26.0 mmol/L      Calcium  9.1 mg/dL      Total Protein 6.3 g/dL      Albumin 3.50 g/dL      ALT (SGPT) 118 (H) U/L      AST (SGOT) 179 (H) U/L      Alkaline Phosphatase 93 U/L      Total Bilirubin 4.1 (H) mg/dL      eGFR Non African Amer 13 (L) mL/min/1.73      Globulin 2.8 gm/dL      A/G Ratio 1.3 g/dL      BUN/Creatinine Ratio 9.2     Anion Gap 16.0 (H) mmol/L     Narrative:       The MDRD GFR formula is only valid for adults with stable renal function between ages 18 and 70.    CBC Auto Differential [[id number]]  (Abnormal) Collected:  06/02/17 1144    Specimen:  Blood Updated:  06/02/17 1253     WBC 5.64 10*3/mm3      RBC 4.22 (L) 10*6/mm3      Hemoglobin 13.2 (L) g/dL      Hematocrit 39.6 %      MCV 93.8 fL      MCH 31.3 pg      MCHC 33.3 g/dL      RDW 23.1 (H) %      RDW-SD 77.1 (H) fl      MPV -- fL       INSTRUMENT UNABLE TO CALCULATE RESULT        Platelets 125 (L) 10*3/mm3      Neutrophil % 77.0 %      Lymphocyte % 11.7 %      Monocyte % 9.6 %      Eosinophil % 0.5 %      Basophil % 0.5 %      Immature Grans % 0.7 (H) %      Neutrophils, Absolute 4.34 10*3/mm3      Lymphocytes, Absolute 0.66 10*3/mm3      Monocytes, Absolute 0.54 10*3/mm3      Eosinophils, Absolute 0.03 10*3/mm3      Basophils, Absolute 0.03 10*3/mm3      Immature Grans, Absolute 0.04 (H) 10*3/mm3      nRBC 0.0 /100 WBC     CK-MB [[id number]]  (Normal) Collected:  06/02/17 1144    Specimen:  Blood Updated:  06/02/17 1258     CKMB 3.34 ng/mL     Troponin [[id number]]  (Abnormal) Collected:  06/02/17 1144    Specimen:  Blood Updated:  06/02/17 1301     Troponin I 0.404 (C) ng/mL     TSH [[id number]]  (Abnormal) Collected:  06/02/17 1144    Specimen:  Blood Updated:  06/02/17 1317     TSH 6.320 (H) mIU/mL     Protime-INR [[id number]]  (Abnormal) Collected:  06/02/17 1242    Specimen:  Blood Updated:  06/02/17 1323     Protime >120.0 (H) Seconds      INR >18.00 (C)      Results confirmed by recollection       Narrative:       Therapeutic range for most indications is  2.0-3.0 INR,  or 2.5-3.5 for mechanical heart valves.    Extra Tubes [[id number]] Collected:  06/02/17 1243    Specimen:  Blood from Blood, Venous Line Updated:  06/02/17 1401    Narrative:       The following orders were created for panel order Extra Tubes.  Procedure                               Abnormality         Status                     ---------                               -----------         ------                     Green Top (Gel)[[id number]]                                  Final result                 Please view results for these tests on the individual orders.    Green Top (Gel) [[id number]] Collected:  06/02/17 1243    Specimen:  Blood Updated:  06/02/17 1401     Extra Tube Hold for add-ons.      Auto resulted.       CBC & Differential [[id number]] Collected:  06/02/17 1144    Specimen:  Blood Updated:  06/02/17 1434    Narrative:       The following orders were created for panel order CBC & Differential.  Procedure                               Abnormality         Status                     ---------                               -----------         ------                     Scan Slide[[id number]]                                       Final result               CBC Auto Differential[[id number]]        Abnormal            Final result                 Please view results for these tests on the individual orders.    Scan Slide [[id number]] Collected:  06/02/17 1144    Specimen:  Blood Updated:  06/02/17 1434     Anisocytosis Large/3+     Dacrocytes Mod/2+     Polychromasia Slight/1+     Schistocytes Slight/1+     WBC Morphology Normal     Platelet Estimate Decreased     Clumped Platelets --      NONE SEEN       Protime-INR [[id number]]  (Abnormal) Collected:  06/02/17 1544    Specimen:  Blood Updated:  06/02/17 1705     Protime >120.0 (H) Seconds      INR >13.00 (C)    Narrative:       Therapeutic range for most indications is 2.0-3.0 INR,  or 2.5-3.5 for mechanical heart valves.    Protime-INR  [[id number]]  (Abnormal) Collected:  06/02/17 1912    Specimen:  Blood Updated:  06/02/17 1953     Protime 16.5 (H) Seconds      INR 1.33 (H)    Narrative:       Therapeutic range for most indications is 2.0-3.0 INR,  or 2.5-3.5 for mechanical heart valves.    Protime-INR [[id number]]  (Abnormal) Collected:  06/03/17 0022    Specimen:  Blood Updated:  06/03/17 0100     Protime 17.7 (H) Seconds      INR 1.46 (H)    Narrative:       Therapeutic range for most indications is 2.0-3.0 INR,  or 2.5-3.5 for mechanical heart valves.    Troponin [[id number]]  (Abnormal) Collected:  06/03/17 0022    Specimen:  Blood Updated:  06/03/17 0115     Troponin I 0.291 (C) ng/mL     Cancer Antigen 19-9 [[id number]] Collected:  06/03/17 0526    Specimen:  Blood Updated:  06/03/17 0551    CEA [[id number]] Collected:  06/03/17 0526    Specimen:  Blood Updated:  06/03/17 0552    CBC Auto Differential [[id number]]  (Abnormal) Collected:  06/03/17 0526    Specimen:  Blood Updated:  06/03/17 0605     WBC 4.94 10*3/mm3      RBC 4.15 (L) 10*6/mm3      Hemoglobin 13.0 (L) g/dL      Hematocrit 39.5 %      MCV 95.2 fL      MCH 31.3 pg      MCHC 32.9 g/dL      RDW 22.6 (H) %      RDW-SD 76.0 (H) fl      MPV -- fL       INSTRUMENT UNABLE TO CALCULATE RESULTS        Platelets 145 (L) 10*3/mm3      Neutrophil % 77.6 %      Lymphocyte % 9.9 (L) %      Monocyte % 10.7 %      Eosinophil % 0.6 %      Basophil % 0.4 %      Immature Grans % 0.8 (H) %      Neutrophils, Absolute 3.83 10*3/mm3      Lymphocytes, Absolute 0.49 (L) 10*3/mm3      Monocytes, Absolute 0.53 10*3/mm3      Eosinophils, Absolute 0.03 10*3/mm3      Basophils, Absolute 0.02 10*3/mm3      Immature Grans, Absolute 0.04 (H) 10*3/mm3     Comprehensive Metabolic Panel [[id number]]  (Abnormal) Collected:  06/03/17 0526    Specimen:  Blood Updated:  06/03/17 0626     Glucose 73 mg/dL      BUN 48 (H) mg/dL      Creatinine 5.21 (H) mg/dL      Sodium 129 (L) mmol/L      Potassium 5.0 mmol/L      Chloride  91 (L) mmol/L      CO2 25.0 mmol/L      Calcium 9.4 mg/dL      Total Protein 6.5 g/dL      Albumin 3.60 g/dL      ALT (SGPT) 125 (H) U/L      AST (SGOT) 204 (H) U/L      Alkaline Phosphatase 104 U/L      Total Bilirubin 5.9 (H) mg/dL      eGFR Non African Amer 11 (L) mL/min/1.73      Globulin 2.9 gm/dL      A/G Ratio 1.2 g/dL      BUN/Creatinine Ratio 9.2     Anion Gap 13.0 mmol/L     Narrative:       The MDRD GFR formula is only valid for adults with stable renal function between ages 18 and 70.    CBC & Differential [[id number]] Collected:  06/03/17 0526    Specimen:  Blood Updated:  06/03/17 0653    Narrative:       The following orders were created for panel order CBC & Differential.  Procedure                               Abnormality         Status                     ---------                               -----------         ------                     Scan Slide[[id number]]                                                                  CBC Auto Differential[[id number]]        Abnormal            Final result                 Please view results for these tests on the individual orders.    Green Top (Gel) [[id number]] Collected:  06/03/17 0554    Specimen:  Blood Updated:  06/03/17 0701     Extra Tube Hold for add-ons.      Auto resulted.       Extra Tubes [[id number]] Collected:  06/03/17 0554    Specimen:  Blood from Blood, Venous Line Updated:  06/03/17 0701    Narrative:       The following orders were created for panel order Extra Tubes.  Procedure                               Abnormality         Status                     ---------                               -----------         ------                     Gold Top - SST[[id number]]                                   Final result               Green Top (Gel)[[id number]]                                  Final result                 Please view results for these tests on the individual orders.    Gold Top - SST [[id number]] Collected:  06/03/17 0554     Specimen:  Blood Updated:  06/03/17 0701     Extra Tube Hold for add-ons.      Auto resulted.       Troponin [[id number]]  (Abnormal) Collected:  06/03/17 0526    Specimen:  Blood Updated:  06/03/17 0739     Troponin I 0.333 (C) ng/mL     Protime-INR [[id number]]  (Abnormal) Collected:  06/03/17 0526    Specimen:  Blood Updated:  06/03/17 0740     Protime 16.0 (H) Seconds      INR 1.28 (H)    Narrative:       Therapeutic range for most indications is 2.0-3.0 INR,  or 2.5-3.5 for mechanical heart valves.            I reviewed the patient's new clinical results.    Assessment/Plan:     Active Hospital Problems (** Indicates Principal Problem)    Diagnosis Date Noted   • **Coumadin toxicity [T45.511A] 06/02/2017     -Will Hold coumadin  -Vitamin K protocol  -Daily INR  -INR on admission >18     • Elevated liver enzymes [R74.8] 06/02/2017     -f/u RUQ U/S  -Dr. Hoang consulted, agreed to see patient  -MRCP tomorrow morning       • Cholelithiases [K80.20] 06/02/2017     -seen on CT 2 weeks ago  -RUQ ultrasound     • Chronic atrial fibrillation [I48.2] 06/02/2017     -continue home meds     • ESRD (end stage renal disease) on dialysis [N18.6, Z99.2] 06/02/2017     Dr. Holguin Consulted will dialyze patient tomorrow  MWF dialysis normally, missed dialysis today     • PAF (paroxysmal atrial fibrillation) [I48.0] 03/09/2017     Continue home medications        Resolved Hospital Problems    Diagnosis Date Noted Date Resolved   No resolved problems to display.       DVT prophylaxis: SCD/RAYRAY  Code status is Conditional Code    Plan for disposition:Where: home and When:  2-3days      Time: 30 minutes         This document has been electronically signed by Paulina Samuel MD on Nena 3, 2017 12:07 PM

## 2017-06-03 NOTE — PLAN OF CARE
Problem: Patient Care Overview (Adult)  Goal: Plan of Care Review  Outcome: Ongoing (interventions implemented as appropriate)    06/03/17 4246   Coping/Psychosocial Response Interventions   Plan Of Care Reviewed With patient   Patient Care Overview   Progress no change   Outcome Evaluation   Outcome Summary/Follow up Plan Patient has poor PO intake, HR is a.fib running anywhere from 110-140's. Patient got dialysis today with 3.5L off. U/S and MRI done today       Goal: Adult Individualization and Mutuality  Outcome: Ongoing (interventions implemented as appropriate)  Goal: Discharge Needs Assessment  Outcome: Ongoing (interventions implemented as appropriate)    Problem: Renal Failure/Kidney Injury, Acute (Adult)  Goal: Signs and Symptoms of Listed Potential Problems Will be Absent or Manageable (Renal Failure/Kidney Injury, Acute)  Outcome: Ongoing (interventions implemented as appropriate)    Problem: Fall Risk (Adult)  Goal: Absence of Falls  Outcome: Ongoing (interventions implemented as appropriate)

## 2017-06-03 NOTE — PROGRESS NOTES
Nephrology Progress Note:    Patient is seen and examined on dialysis.  He looks cachectic, has lost a lot of weight.  Comfortable on dialysis.  Heart rate is 123.  He denies any chest pain or shortness of breath.  Complains of nausea.  Has edema of the lower extremities.    Patient Active Problem List   Diagnosis   • Pulmonary embolism   • Long-term (current) use of anticoagulants   • Pneumonia of both lungs due to infectious organism   • CKD (chronic kidney disease) stage 5, GFR less than 15 ml/min   • Volume overload   • PAF (paroxysmal atrial fibrillation)   • Benign essential HTN   • CKD (chronic kidney disease) stage 5, GFR less than 15 ml/min   • Surgical aftercare, circulatory system   • Coumadin toxicity   • Elevated liver enzymes   • Cholelithiases   • Chronic atrial fibrillation   • ESRD (end stage renal disease) on dialysis       Medications:    carvedilol 12.5 mg Oral BID With Meals   colchicine 0.6 mg Oral Daily   diltiaZEM  mg Oral Daily   isosorbide mononitrate 30 mg Oral Daily   ranolazine 500 mg Oral BID       sodium chloride 125 mL/hr Last Rate: 125 mL/hr (06/02/17 1958)     Vitals:    06/03/17 0500 06/03/17 0600 06/03/17 0714 06/03/17 0725   BP:    116/82   BP Location:    Right arm   Patient Position:    Lying   Pulse: (!) 130 (!) 130 (!) 129 (!) 133   Resp: 18   20   Temp: 97 °F (36.1 °C)   97.4 °F (36.3 °C)   TempSrc: Temporal Artery    Tympanic   SpO2:       Weight:    170 lb 10.2 oz (77.4 kg)   Height:         I/O last 3 completed shifts:  In: 1000 [I.V.:1000]  Out: 100 [Urine:100]       On examination:  General:Seen and examined on dialysis.  Cachectic.  No distress  HEENT: Pallor present, no icterus, eye movements are normal  Chest: Muscle wasting.  At entry is equal bilaterally  CVS: Atrial fibrillation, rate 123/m.  No pericardial rub or gallop  Abdomen: Soft, distended, normal bowel sounds with no rebound or guarding  Extremities: 2+ edema of the lower extremities.  Neuro: Limited  examination on dialysis.  No focal motor neurological deficits.  Mentation: Alert and oriented    Past medical illness, social history, medications, previous notes reviewed.       Laboratory results:      Recent Results (from the past 24 hour(s))   Comprehensive Metabolic Panel    Collection Time: 06/02/17 11:44 AM   Result Value Ref Range    Glucose 79 60 - 100 mg/dL    BUN 41 (H) 7 - 21 mg/dL    Creatinine 4.44 (H) 0.70 - 1.30 mg/dL    Sodium 131 (L) 137 - 145 mmol/L    Potassium 4.3 3.5 - 5.1 mmol/L    Chloride 89 (L) 95 - 110 mmol/L    CO2 26.0 22.0 - 31.0 mmol/L    Calcium 9.1 8.4 - 10.2 mg/dL    Total Protein 6.3 6.3 - 8.6 g/dL    Albumin 3.50 3.40 - 4.80 g/dL    ALT (SGPT) 118 (H) 21 - 72 U/L    AST (SGOT) 179 (H) 17 - 59 U/L    Alkaline Phosphatase 93 38 - 126 U/L    Total Bilirubin 4.1 (H) 0.2 - 1.3 mg/dL    eGFR Non  Amer 13 (L) 42 - 98 mL/min/1.73    Globulin 2.8 2.3 - 3.5 gm/dL    A/G Ratio 1.3 1.1 - 1.8 g/dL    BUN/Creatinine Ratio 9.2 7.0 - 25.0    Anion Gap 16.0 (H) 5.0 - 15.0 mmol/L   Amylase    Collection Time: 06/02/17 11:44 AM   Result Value Ref Range    Amylase 56 50 - 130 U/L   Lipase    Collection Time: 06/02/17 11:44 AM   Result Value Ref Range    Lipase 65 23 - 300 U/L   CK    Collection Time: 06/02/17 11:44 AM   Result Value Ref Range    Creatine Kinase 233 (H) 55 - 170 U/L   CK-MB    Collection Time: 06/02/17 11:44 AM   Result Value Ref Range    CKMB 3.34 0.00 - 5.00 ng/mL   Troponin    Collection Time: 06/02/17 11:44 AM   Result Value Ref Range    Troponin I 0.404 (C) <=0.034 ng/mL   Magnesium    Collection Time: 06/02/17 11:44 AM   Result Value Ref Range    Magnesium 2.2 1.6 - 2.3 mg/dL   TSH    Collection Time: 06/02/17 11:44 AM   Result Value Ref Range    TSH 6.320 (H) 0.460 - 4.680 mIU/mL   CBC Auto Differential    Collection Time: 06/02/17 11:44 AM   Result Value Ref Range    WBC 5.64 3.20 - 9.80 10*3/mm3    RBC 4.22 (L) 4.37 - 5.74 10*6/mm3    Hemoglobin 13.2 (L) 13.7 - 17.3  g/dL    Hematocrit 39.6 39.0 - 49.0 %    MCV 93.8 80.0 - 98.0 fL    MCH 31.3 26.5 - 34.0 pg    MCHC 33.3 31.5 - 36.3 g/dL    RDW 23.1 (H) 11.5 - 14.5 %    RDW-SD 77.1 (H) 35.1 - 43.9 fl    MPV  8.0 - 12.0 fL    Platelets 125 (L) 150 - 450 10*3/mm3    Neutrophil % 77.0 37.0 - 80.0 %    Lymphocyte % 11.7 10.0 - 50.0 %    Monocyte % 9.6 0.0 - 12.0 %    Eosinophil % 0.5 0.0 - 7.0 %    Basophil % 0.5 0.0 - 2.0 %    Immature Grans % 0.7 (H) 0.0 - 0.5 %    Neutrophils, Absolute 4.34 2.00 - 8.60 10*3/mm3    Lymphocytes, Absolute 0.66 0.60 - 4.20 10*3/mm3    Monocytes, Absolute 0.54 0.00 - 0.90 10*3/mm3    Eosinophils, Absolute 0.03 0.00 - 0.70 10*3/mm3    Basophils, Absolute 0.03 0.00 - 0.20 10*3/mm3    Immature Grans, Absolute 0.04 (H) 0.00 - 0.02 10*3/mm3    nRBC 0.0 0.0 - 0.0 /100 WBC   Scan Slide    Collection Time: 06/02/17 11:44 AM   Result Value Ref Range    Anisocytosis Large/3+ None Seen    Dacrocytes Mod/2+ None Seen    Polychromasia Slight/1+ None Seen    Schistocytes Slight/1+ None Seen    WBC Morphology Normal Normal    Platelet Estimate Decreased Normal    Clumped Platelets  None Seen   Protime-INR    Collection Time: 06/02/17 12:42 PM   Result Value Ref Range    Protime >120.0 (H) 11.1 - 15.3 Seconds    INR >18.00 (C) 0.80 - 1.20   Green Top (Gel)    Collection Time: 06/02/17 12:43 PM   Result Value Ref Range    Extra Tube Hold for add-ons.    Protime-INR    Collection Time: 06/02/17  3:44 PM   Result Value Ref Range    Protime >120.0 (H) 11.1 - 15.3 Seconds    INR >13.00 (C) 0.80 - 1.20   Protime-INR    Collection Time: 06/02/17  7:12 PM   Result Value Ref Range    Protime 16.5 (H) 11.1 - 15.3 Seconds    INR 1.33 (H) 0.80 - 1.20   Protime-INR    Collection Time: 06/03/17 12:22 AM   Result Value Ref Range    Protime 17.7 (H) 11.1 - 15.3 Seconds    INR 1.46 (H) 0.80 - 1.20   Troponin    Collection Time: 06/03/17 12:22 AM   Result Value Ref Range    Troponin I 0.291 (C) <=0.034 ng/mL   Protime-INR    Collection  Time: 06/03/17  5:26 AM   Result Value Ref Range    Protime 16.0 (H) 11.1 - 15.3 Seconds    INR 1.28 (H) 0.80 - 1.20   Troponin    Collection Time: 06/03/17  5:26 AM   Result Value Ref Range    Troponin I 0.333 (C) <=0.034 ng/mL   Comprehensive Metabolic Panel    Collection Time: 06/03/17  5:26 AM   Result Value Ref Range    Glucose 73 60 - 100 mg/dL    BUN 48 (H) 7 - 21 mg/dL    Creatinine 5.21 (H) 0.70 - 1.30 mg/dL    Sodium 129 (L) 137 - 145 mmol/L    Potassium 5.0 3.5 - 5.1 mmol/L    Chloride 91 (L) 95 - 110 mmol/L    CO2 25.0 22.0 - 31.0 mmol/L    Calcium 9.4 8.4 - 10.2 mg/dL    Total Protein 6.5 6.3 - 8.6 g/dL    Albumin 3.60 3.40 - 4.80 g/dL    ALT (SGPT) 125 (H) 21 - 72 U/L    AST (SGOT) 204 (H) 17 - 59 U/L    Alkaline Phosphatase 104 38 - 126 U/L    Total Bilirubin 5.9 (H) 0.2 - 1.3 mg/dL    eGFR Non African Amer 11 (L) >60 mL/min/1.73    Globulin 2.9 2.3 - 3.5 gm/dL    A/G Ratio 1.2 1.1 - 1.8 g/dL    BUN/Creatinine Ratio 9.2 7.0 - 25.0    Anion Gap 13.0 5.0 - 15.0 mmol/L   CBC Auto Differential    Collection Time: 06/03/17  5:26 AM   Result Value Ref Range    WBC 4.94 3.20 - 9.80 10*3/mm3    RBC 4.15 (L) 4.37 - 5.74 10*6/mm3    Hemoglobin 13.0 (L) 13.7 - 17.3 g/dL    Hematocrit 39.5 39.0 - 49.0 %    MCV 95.2 80.0 - 98.0 fL    MCH 31.3 26.5 - 34.0 pg    MCHC 32.9 31.5 - 36.3 g/dL    RDW 22.6 (H) 11.5 - 14.5 %    RDW-SD 76.0 (H) 35.1 - 43.9 fl    MPV  8.0 - 12.0 fL    Platelets 145 (L) 150 - 450 10*3/mm3    Neutrophil % 77.6 37.0 - 80.0 %    Lymphocyte % 9.9 (L) 10.0 - 50.0 %    Monocyte % 10.7 0.0 - 12.0 %    Eosinophil % 0.6 0.0 - 7.0 %    Basophil % 0.4 0.0 - 2.0 %    Immature Grans % 0.8 (H) 0.0 - 0.5 %    Neutrophils, Absolute 3.83 2.00 - 8.60 10*3/mm3    Lymphocytes, Absolute 0.49 (L) 0.60 - 4.20 10*3/mm3    Monocytes, Absolute 0.53 0.00 - 0.90 10*3/mm3    Eosinophils, Absolute 0.03 0.00 - 0.70 10*3/mm3    Basophils, Absolute 0.02 0.00 - 0.20 10*3/mm3    Immature Grans, Absolute 0.04 (H) 0.00 - 0.02  10*3/mm3   Gold Top - SST    Collection Time: 06/03/17  5:54 AM   Result Value Ref Range    Extra Tube Hold for add-ons.    Green Top (Gel)    Collection Time: 06/03/17  5:54 AM   Result Value Ref Range    Extra Tube Hold for add-ons.    ]    Imaging Results (last 24 hours)     Procedure Component Value Units Date/Time    XR Chest 1 View [[id number]] Collected:  06/02/17 1235     Updated:  06/02/17 1252    Narrative:         EXAM:         Radiograph(s), Chest   VIEWS:   Portable ; 1       DATE/TIME: 6/2/2017 12:49 PM CDT               INDICATION:     Weakness      COMPARISON:   CXR: 3/13/17          FINDINGS:           - lines/tubes:     Right approach large caliber central venous  catheter in standard position.     - cardiac:          size within normal limits         - mediastinum:  contour within normal limits         - lungs:          no focal air space process, pulmonary  interstitial edema, nodule(s)/mass             - pleura:          Tiny left pleural fluid collection of doubtful  clinical significance.                  - osseous:          Status post CABG                  - misc.:        Impression:       CONCLUSION:        1. No evidence of an acute cardiopulmonary process.                      Electronically signed by:  SAROJ Ortega MD  6/2/2017 12:52  PM CDT Workstation: [id number]        Assessment:     End-stage disease  Weight loss, persistent nausea and vomiting  Abnormal liver function tests, hyperbilirubinemia  Coagulopathy  Atrial fibrillation with rapid ventricular rate  Essential hypertension  History of nephrolithiasis and urinary tract infections    Plan:     ESRD: Patient is usually on dialysis on Monday Wednesday and Friday.  Patient is seen and examined on dialysis.  We are using PermCath for dialysis.  Fluid removal on dialysis if tolerated.  His heart rate is up from atrial fibrillation, and needs to be closely monitored on dialysis.  Hemodialysis orders were reviewed.   Next    Coagulopathy: Anticoagulation is on hold.  Follow INR.  No bleeding manifestations.  Hemoglobin is stable.    Atrial fibrillation: Cardiology has evaluated.    Weight loss, hyperbilirubinemia, persistent nausea and vomiting.  Appreciate Dr. Hoang's evaluation.   Progressive jaundice.      Ezio Sweeney MD

## 2017-06-03 NOTE — PROGRESS NOTES
LOS: 1 day   Patient Care Team:  Dago Lopez MD as PCP - General    Chief Complaint: Coumadin toxicity    Subjective     Interval History: Fatigue and weakness    Patient Complaints: fatigue  Patient Denies:  fever  History taken from: patient chart family RN    Review of Systems:    Review of Systems   Constitutional: Positive for activity change, appetite change and fatigue. Negative for fever.   HENT: Negative for ear pain and sore throat.    Eyes: Negative for pain and visual disturbance.   Respiratory: Negative for cough and shortness of breath.    Cardiovascular: Negative for chest pain and palpitations.   Gastrointestinal: Negative for abdominal pain and nausea.   Endocrine: Negative for cold intolerance and heat intolerance.   Genitourinary: Negative for difficulty urinating and dysuria.   Musculoskeletal: Negative for arthralgias and gait problem.   Skin: Negative for color change and rash.   Neurological: Negative for dizziness, weakness and headaches.   Hematological: Negative for adenopathy. Does not bruise/bleed easily.   Psychiatric/Behavioral: Negative for agitation, confusion and sleep disturbance.       Objective     Vital Signs  Temp:  [96.1 °F (35.6 °C)-97.4 °F (36.3 °C)] 96.1 °F (35.6 °C)  Heart Rate:  [118-133] 118  Resp:  [18-20] 18  BP: (112-126)/(72-82) 126/77    Physical Exam:   Physical Exam   Constitutional: He is oriented to person, place, and time. He appears well-developed and well-nourished. He appears lethargic.  Non-toxic appearance. He has a sickly appearance. He appears ill. No distress.   HENT:   Head: Normocephalic and atraumatic.   Right Ear: External ear normal.   Left Ear: External ear normal.   Nose: Nose normal.   Mouth/Throat: Oropharynx is clear and moist.   Eyes: Conjunctivae and EOM are normal. Pupils are equal, round, and reactive to light. Right eye exhibits no discharge. Left eye exhibits no discharge. No scleral icterus.   Neck: Normal range of motion.  Neck supple. No JVD present. No tracheal deviation present. No thyromegaly present.   Cardiovascular: Normal heart sounds and intact distal pulses.  An irregularly irregular rhythm present. Tachycardia present.  Exam reveals no gallop and no friction rub.    No murmur heard.  Pulmonary/Chest: Effort normal and breath sounds normal. No stridor. No respiratory distress. He has no wheezes. He has no rales. He exhibits no tenderness.   Abdominal: Soft. Bowel sounds are normal. He exhibits no distension and no mass. There is no tenderness. There is no rebound and no guarding. No hernia.   Musculoskeletal: Normal range of motion. He exhibits no edema or deformity.   Lymphadenopathy:     He has no cervical adenopathy.   Neurological: He is oriented to person, place, and time. He appears lethargic. He exhibits normal muscle tone. Coordination normal.   Skin: Skin is warm and dry. No erythema.   Psychiatric: He has a normal mood and affect. His behavior is normal. Judgment and thought content normal.   Nursing note and vitals reviewed.       Results Review:       Lab Results (last 24 hours)     Procedure Component Value Units Date/Time    TSH [[id number]]  (Abnormal) Collected:  06/02/17 1144    Specimen:  Blood Updated:  06/02/17 1317     TSH 6.320 (H) mIU/mL     Protime-INR [[id number]]  (Abnormal) Collected:  06/02/17 1242    Specimen:  Blood Updated:  06/02/17 1323     Protime >120.0 (H) Seconds      INR >18.00 (C)      Results confirmed by recollection       Narrative:       Therapeutic range for most indications is 2.0-3.0 INR,  or 2.5-3.5 for mechanical heart valves.    Extra Tubes [[id number]] Collected:  06/02/17 1243    Specimen:  Blood from Blood, Venous Line Updated:  06/02/17 1401    Narrative:       The following orders were created for panel order Extra Tubes.  Procedure                               Abnormality         Status                     ---------                               -----------         ------                      Green Top (Gel)[[id number]]                                  Final result                 Please view results for these tests on the individual orders.    Green Top (Gel) [[id number]] Collected:  06/02/17 1243    Specimen:  Blood Updated:  06/02/17 1401     Extra Tube Hold for add-ons.      Auto resulted.       CBC & Differential [[id number]] Collected:  06/02/17 1144    Specimen:  Blood Updated:  06/02/17 1434    Narrative:       The following orders were created for panel order CBC & Differential.  Procedure                               Abnormality         Status                     ---------                               -----------         ------                     Scan Slide[[id number]]                                       Final result               CBC Auto Differential[[id number]]        Abnormal            Final result                 Please view results for these tests on the individual orders.    Scan Slide [[id number]] Collected:  06/02/17 1144    Specimen:  Blood Updated:  06/02/17 1434     Anisocytosis Large/3+     Dacrocytes Mod/2+     Polychromasia Slight/1+     Schistocytes Slight/1+     WBC Morphology Normal     Platelet Estimate Decreased     Clumped Platelets --      NONE SEEN       Protime-INR [[id number]]  (Abnormal) Collected:  06/02/17 1544    Specimen:  Blood Updated:  06/02/17 1705     Protime >120.0 (H) Seconds      INR >13.00 (C)    Narrative:       Therapeutic range for most indications is 2.0-3.0 INR,  or 2.5-3.5 for mechanical heart valves.    Protime-INR [[id number]]  (Abnormal) Collected:  06/02/17 1912    Specimen:  Blood Updated:  06/02/17 1953     Protime 16.5 (H) Seconds      INR 1.33 (H)    Narrative:       Therapeutic range for most indications is 2.0-3.0 INR,  or 2.5-3.5 for mechanical heart valves.    Protime-INR [[id number]]  (Abnormal) Collected:  06/03/17 0022    Specimen:  Blood Updated:  06/03/17 0100     Protime 17.7 (H) Seconds      INR 1.46 (H)     Narrative:       Therapeutic range for most indications is 2.0-3.0 INR,  or 2.5-3.5 for mechanical heart valves.    Troponin [[id number]]  (Abnormal) Collected:  06/03/17 0022    Specimen:  Blood Updated:  06/03/17 0115     Troponin I 0.291 (C) ng/mL     Cancer Antigen 19-9 [[id number]] Collected:  06/03/17 0526    Specimen:  Blood Updated:  06/03/17 0551    CEA [[id number]] Collected:  06/03/17 0526    Specimen:  Blood Updated:  06/03/17 0552    CBC Auto Differential [[id number]]  (Abnormal) Collected:  06/03/17 0526    Specimen:  Blood Updated:  06/03/17 0605     WBC 4.94 10*3/mm3      RBC 4.15 (L) 10*6/mm3      Hemoglobin 13.0 (L) g/dL      Hematocrit 39.5 %      MCV 95.2 fL      MCH 31.3 pg      MCHC 32.9 g/dL      RDW 22.6 (H) %      RDW-SD 76.0 (H) fl      MPV -- fL       INSTRUMENT UNABLE TO CALCULATE RESULTS        Platelets 145 (L) 10*3/mm3      Neutrophil % 77.6 %      Lymphocyte % 9.9 (L) %      Monocyte % 10.7 %      Eosinophil % 0.6 %      Basophil % 0.4 %      Immature Grans % 0.8 (H) %      Neutrophils, Absolute 3.83 10*3/mm3      Lymphocytes, Absolute 0.49 (L) 10*3/mm3      Monocytes, Absolute 0.53 10*3/mm3      Eosinophils, Absolute 0.03 10*3/mm3      Basophils, Absolute 0.02 10*3/mm3      Immature Grans, Absolute 0.04 (H) 10*3/mm3     Comprehensive Metabolic Panel [[id number]]  (Abnormal) Collected:  06/03/17 0526    Specimen:  Blood Updated:  06/03/17 0626     Glucose 73 mg/dL      BUN 48 (H) mg/dL      Creatinine 5.21 (H) mg/dL      Sodium 129 (L) mmol/L      Potassium 5.0 mmol/L      Chloride 91 (L) mmol/L      CO2 25.0 mmol/L      Calcium 9.4 mg/dL      Total Protein 6.5 g/dL      Albumin 3.60 g/dL      ALT (SGPT) 125 (H) U/L      AST (SGOT) 204 (H) U/L      Alkaline Phosphatase 104 U/L      Total Bilirubin 5.9 (H) mg/dL      eGFR Non African Amer 11 (L) mL/min/1.73      Globulin 2.9 gm/dL      A/G Ratio 1.2 g/dL      BUN/Creatinine Ratio 9.2     Anion Gap 13.0 mmol/L     Narrative:       The MDRD  GFR formula is only valid for adults with stable renal function between ages 18 and 70.    CBC & Differential [[id number]] Collected:  06/03/17 0526    Specimen:  Blood Updated:  06/03/17 0653    Narrative:       The following orders were created for panel order CBC & Differential.  Procedure                               Abnormality         Status                     ---------                               -----------         ------                     Scan Slide[[id number]]                                                                  CBC Auto Differential[[id number]]        Abnormal            Final result                 Please view results for these tests on the individual orders.    Green Top (Gel) [[id number]] Collected:  06/03/17 0554    Specimen:  Blood Updated:  06/03/17 0701     Extra Tube Hold for add-ons.      Auto resulted.       Extra Tubes [[id number]] Collected:  06/03/17 0554    Specimen:  Blood from Blood, Venous Line Updated:  06/03/17 0701    Narrative:       The following orders were created for panel order Extra Tubes.  Procedure                               Abnormality         Status                     ---------                               -----------         ------                     Gold Top - SST[[id number]]                                   Final result               Green Top (Gel)[[id number]]                                  Final result                 Please view results for these tests on the individual orders.    Gold Top - SST [[id number]] Collected:  06/03/17 0554    Specimen:  Blood Updated:  06/03/17 0701     Extra Tube Hold for add-ons.      Auto resulted.       Troponin [[id number]]  (Abnormal) Collected:  06/03/17 0526    Specimen:  Blood Updated:  06/03/17 0739     Troponin I 0.333 (C) ng/mL     Protime-INR [[id number]]  (Abnormal) Collected:  06/03/17 0526    Specimen:  Blood Updated:  06/03/17 0740     Protime 16.0 (H) Seconds      INR 1.28 (H)    Narrative:        Therapeutic range for most indications is 2.0-3.0 INR,  or 2.5-3.5 for mechanical heart valves.        EXAMINATION: Computed Tomography   REGION: Abdomen / Pelvis   INDICATION: Nausea/vomiting  HISTORY:  LOAN. IMAGING: CT A/P 12/22/09 (report)   TECHNIQUE:  - reconstructions: axial, coronal, sagittal  - contrast: oral: Yes ; intravenous: no  - Please note:  - Lack of IV contrast limits assessment of solid organ  parenchyma, urinary system, or vascular structures.  This exam was performed according to the departmental  dose-optimization program which includes automated exposure  control, adjustment of the mA and/or kV according to patient size  and/or use of iterative reconstruction technique.       COMMENTS:     THORAX (INFERIOR):  The lung bases are clear.   There are moderate-sized bilateral pleural fluid collections.  The heart size is normal size and there is no pericardial fluid.     ABDOMEN:  Limited assessment of the solid organ parenchyma is grossly  unremarkable demonstrating no evidence of organomegaly.   Cholelithiasis without gross evidence of wall thickening.  Limited assessment of the viscera is grossly unremarkable  demonstrating normal caliber bowel loops.   No evidence of free fluid or free intraperitoneal air.   The osseous structures are grossly unremarkable for age.     RETROPERITONEUM:  Unenhanced assessment of the kidneys:  Right: displays an attenuated right kidney with a 1.1 cm calculus  in the central collecting system possibly representing an  early/small staghorn calculus. A 1.8 cm focal hypoattenuating  defect is noted in the interpolar region.  Left: Native renal tissue is not visualized. There is a 12.6 cm  hypoattenuating lesion in the region of the left renal bed,  presumably of renal etiology. Additional septated hypoattenuating  lesions are noted more superiorly..     Limited assessment of the ureters demonstrates normal caliber and  course.   The adrenal glands are of normal size  and contour.   No gross evidence of significant retroperitoneal adenopathy.  The vascular structures are grossly within normal limits for age.     PELVIS:  Limited assessment of the viscera is grossly unremarkable  demonstrating normal caliber bowel loops.   No evidence of free fluid or free intraperitoneal air.   The osseous structures are grossly unremarkable for age.   The vascular structures are grossly within normal limits for age.  Markedly enlarged prostate.      .   IMPRESSION:  CONCLUSION:   1. Moderate-sized bilateral pleural fluid collections with  associated extrinsic compressive atelectasis.  2. Diminished right renal size, and native left renal parenchyma  is not visualized. Multiple left-sided hypoattenuating lesions as  described above, presumably of left renal origin.  3. Cholelithiasis.            Electronically signed by: SAROJ Ortega MD 5/16/2017 2:40  PM CDT Workstation: [employer]    Medication Review:   Current Facility-Administered Medications   Medication Dose Route Frequency Provider Last Rate Last Dose   • acetaminophen-codeine (TYLENOL #3) 300-30 MG per tablet 1 tablet  1 tablet Oral Q6H PRN Paulina Samuel MD   1 tablet at 06/03/17 1252   • albumin human 25 % IV SOLN 25 g  25 g Intravenous PRN Ezio Sweeney MD       • carvedilol (COREG) tablet 12.5 mg  12.5 mg Oral BID With Meals Paulina Samuel MD   12.5 mg at 06/03/17 1251   • colchicine tablet 0.6 mg  0.6 mg Oral Daily Paulina Samuel MD   0.6 mg at 06/03/17 1252   • diltiaZEM CD (CARDIZEM CD) 24 hr capsule 120 mg  120 mg Oral Daily Paulina Samuel MD   120 mg at 06/03/17 1251   • heparin (porcine) injection 4,100 Units  4,100 Units Intracatheter Daily PRN Ezio Sweeney MD   4,100 Units at 06/03/17 1040   • isosorbide mononitrate (IMDUR) 24 hr tablet 30 mg  30 mg Oral Daily Paulina Samuel MD   30 mg at 06/03/17 1251   • ranolazine (RANEXA) 12 hr tablet 500 mg  500 mg Oral BID  Paulina Samuel MD   500 mg at 06/03/17 1251   • sodium chloride 0.9 % flush 1-10 mL  1-10 mL Intravenous PRN Paulina Samuel MD           Assessment/Plan     Principal Problem:    Coumadin toxicity  Active Problems:    PAF (paroxysmal atrial fibrillation)    Elevated liver enzymes    Cholelithiases    Chronic atrial fibrillation    ESRD (end stage renal disease) on dialysis    Plan    Coumadin toxicity resolved  Eval of elevated transaminases and hyperbilirubinemia underway  Consult GI    I have examined the patient and reviewed the pertinent diagnostic data. I have discussed the case with the Family Medicine Resident and agree with the assessment and plan of care.    Home Hernandez DO             This document has been electronically signed by Home Hernandez DO on Nena 3, 2017 1:20 PM

## 2017-06-03 NOTE — PLAN OF CARE
Problem: Patient Care Overview (Adult)  Goal: Plan of Care Review  Outcome: Ongoing (interventions implemented as appropriate)  Goal: Adult Individualization and Mutuality  Outcome: Ongoing (interventions implemented as appropriate)  Goal: Discharge Needs Assessment  Outcome: Ongoing (interventions implemented as appropriate)    Problem: Renal Failure/Kidney Injury, Acute (Adult)  Goal: Signs and Symptoms of Listed Potential Problems Will be Absent or Manageable (Renal Failure/Kidney Injury, Acute)  Outcome: Ongoing (interventions implemented as appropriate)    Problem: Fall Risk (Adult)  Goal: Identify Related Risk Factors and Signs and Symptoms  Outcome: Outcome(s) achieved Date Met:  06/02/17  Goal: Absence of Falls  Outcome: Ongoing (interventions implemented as appropriate)

## 2017-06-03 NOTE — CONSULTS
Cardiology Consultation Note.        Patient Name: Roni Middleton  Age/Sex: 83 y.o. male  : 1933  MRN: [id number]    Date of consultation: 2017  Consulting Physician: Geronimo Jenkins MD  Primary care Physician: Dago Lopez MD  Requesting Physician:   Paulina Samuel MD  Reason for consultation:  Atypical symptoms of chest pain and elevated troponin suggestive for non-Q myocardial infarction with generalized weakness with history of atherosclerotic coronary artery disease with previous coronary artery bypass grafting      Subjective:       Chief Complaint: Generalized weakness fatigue and weight loss with atypical symptoms of chest pain.    History of Present Illness:  Roni Middleton is a 83 y.o. male     Body mass index is 21.83 kg/(m^2). with a PMH significant for atherosclerotic CAD status post CABG done in  with LIMA to LAD and SVG to the diagonal branch, SVG to the obtuse marginal branch and a SVG to the PDA, history of arterial hypertension, hypertensive heart disease, mild mitral and mild tricuspid regurgitation with the last coronary angiogram done in  prior to the bypass surgery which revealed diffuse native CAD with positive head up tilt table testing with a drop in the BP from 142/80 to 56/38 and a drop in the heart rate from 75 beats per minute to 45 beats per minute with symptoms of lightheaded dizziness suggestive of neurocardiogenic etiology for syncope, with history of hypertensive heart disease, moderate tricuspid regurgitation, mild mitral regurgitation and mild pulmonic insufficiency with concentric left ventricular hypertrophy with diastolic dysfunction with an ejection fraction of 55%-60%, history of chronic kidney disease stage 4 currently on hemodialysis 3 times a week for the last 2 months followed by Dr. Sweeney, history of gout, diverticulosis, iron deficiency anemia, benign prostatic hypertrophy and peripheral vascular disease with carotid  endarterectomy.         Patient had complain of having generalized weakness fatigue in upper and lower extremity along with some atypical symptoms of chest pain.  Patient was evaluated by Dr. Sweeney, about 2 months ago and was found to have cholelithiasis.  Patient subsequently was evaluated by Dr. Hoang.  Patient had complain of having weight loss and was recommended to upper undergo an upper endoscopy.  Patient has diffuse to undergo upper endoscopy to further evaluate the weight loss.  Patient continued to have generalized weakness and fatigue and presented to the emergency room.  Patient was scheduled for an office appointment today.  Patient had presented to the emergency room and was subsequently hospitalized.  Patient was found to have an elevated total bilirubin.  Patient on questioning complained of having symptoms of atypical chest pain along with shortness of breath.  Patient denies any paroxysmal dyspnea or orthopnea.  Patient complained of having bilateral lower extremity edema.  Patient has had decreased by mouth intake and severe weight loss.    Patient 10 point review of system except for stated in the history of present illness is negative          Past Medical History:  1.  Generalized weakness fatigue and weight loss with atypical symptoms of chest pain with elevated troponin suggestive for non-Q myocardial infarction.  2.  Atherosclerotic coronary artery disease.  3. Status post CABG done in 2009 with   a. LIMA to LAD   b. SVG to diagonal branch   c. SVG to obtuse marginal branch   d. SVG to the PDA   4. Paroxysmal atrial fibrillation with atrial tachycardia in normal sinus rhythm   5. Positive head up tilt table testing for evidence of neurocardiogenic etiology for syncope done in 2007   6. Labile arterial hypertension with autonomic dysfunction   7. Preserved left ventricular systolic function with an ejection fraction of 55%   8. Moderate tricuspid regurgitation and mild mitral  regurgitation   9. Chronic kidney disease stage 3 to 4 currently on hemodialysis 3 times a week.  10. Peripheral vascular disease status post right carotid endarterectomy   11. Gouty arthritis   12. Benign prostatic hypertrophy   13. Chronic back pain   14. Iron deficiency anemia with history of gastritis   15. Diverticulosis   16. Hiatal hernia   17. TIA   18. Paroxysmal atrial fibrillation in sinus rhythm on  Coumadin.  19.  Macular degeneration.  20.  Bilateral hearing deficit.          Past Medical History:   Diagnosis Date   • A-fib    • Bleeding ulcer     approx 9 years ago prior to by pass surgery   • BPH (benign prostatic hyperplasia)    • Coagulopathy     coumadin   • Coronary artery disease     pt relates has been told in past has leaky valve   • Hearing loss    • History of echocardiogram 07/16/2013    flow 54531 (1)    Biatrial enlargement with mild CLVH with normal aortic root size. LV systolic function well preserved with EF of 55-60%. Mitral and AV thickened. Tricuspid valve intact. Diastolic dysfunction.    • Hypertension     since dialysis has placed some of bp meds on hold   • Kidney disease    • Kidney failure    • Macular degeneration    • Macular degeneration    • Nausea & vomiting    • Stroke     mini   • Wears glasses        Past Surgical History:  1. Coronary artery bypass grafting done in 2009 with:   a. LIMA to the LAD   b. Saphenous vein graft to the diagonal branch   c. Saphenous vein graft to the obtuse marginal branch   d. Saphenous vein graft to the posterior descending artery   2. Right carotid endarterectomy   3. Right inguinal hernia repair   4. Excision of left groin lymph nodes with left indirect inguinal hernia repair   5. Colonoscopy   6. Cystoscopy   7. EGD   8. Lumbar laminectomy with diskectomy       Past Surgical History:   Procedure Laterality Date   • ARTERIOVENOUS FISTULA/SHUNT SURGERY Left 5/2/2017    Procedure: LEFT BRACHIAL ARTERY TO AXILLARY VEIN  ARTERIOVENOUS GRAFT   "   (artegraft);  Surgeon: Jovanni Esparza MD;  Location: [facility] OR;  Service:    • BACK SURGERY     • CAROTID ENDARTERECTOMY     • CORONARY ARTERY BYPASS GRAFT     • EYE SURGERY     • FOREIGN BODY REMOVAL Left     foot   • HERNIA REPAIR      ingunial right and left   • INTERVENTIONAL RADIOLOGY PROCEDURE N/A 3/14/2017    Procedure: tunneled central venous catheter placement;  Surgeon: Jovanni Esparza MD;  Location: [facility]IVE LOCATION;  Service:        Family History:  Family History   Problem Relation Age of Onset   • Heart disease Father    • Alcohol abuse Son        Social History:  Social History     Social History   • Marital status:      Spouse name: N/A   • Number of children: N/A   • Years of education: N/A     Occupational History   • Not on file.     Social History Main Topics   • Smoking status: Former Smoker     Quit date: 2/5/1970   • Smokeless tobacco: Never Used   • Alcohol use No   • Drug use: No      Comment: prescrition   • Sexual activity: Defer     Other Topics Concern   • Not on file     Social History Narrative        Cardiac Risk factor:   1. Male 2. Arterial hypertension 3. Hyperlipidemia 4. Family history for CAD       Allergies:  Allergies   Allergen Reactions   • Flomax [Tamsulosin Hcl] Other (See Comments)     Pt states made him \"weak\"   • Motrin [Ibuprofen] Other (See Comments)     Pt states made him \"weak\"       Medication::  Prescriptions Prior to Admission   Medication Sig Dispense Refill Last Dose   • acetaminophen-codeine (TYLENOL #3) 300-30 MG per tablet Take 1 tablet by mouth Every 6 (Six) Hours As Needed for moderate pain (4-6).   Unknown at Unknown time   • carvedilol (COREG) 12.5 MG tablet Take 1 tablet by mouth 2 (Two) Times a Day With Meals. (Patient not taking: Reported on 6/2/2017) 60 tablet 0 Not Taking at Unknown time   • colchicine 0.6 MG tablet Take 0.6 mg by mouth Daily.   Unknown at Unknown time   • diltiaZEM CD (CARDIZEM CD) 120 MG 24 " hr capsule Take 1 capsule by mouth Daily. 30 capsule 0 Unknown at Unknown time   • isosorbide mononitrate (IMDUR) 30 MG 24 hr tablet Take 30 mg by mouth Daily.   Unknown at Unknown time   • Misc Natural Products (BLACK CHERRY CONCENTRATE PO) Take 1 capsule by mouth Daily. For gout (1 capsule = 500 mg)   Unknown at Unknown time   • ranolazine (RANEXA) 500 MG 12 hr tablet Take 500 mg by mouth 2 (Two) Times a Day.   Unknown at Unknown time   • sucralfate (CARAFATE) 1 G tablet Take 1 g by mouth 3 (Three) Times a Day. For 14 days.   Unknown at Unknown time   • warfarin (COUMADIN) 2.5 MG tablet Take one tablet daily except on Monday, take two tablets or as Directed by [facility] 100 tablet 1 Unknown at Unknown time           Review of Systems:       Constitutional:  Denies recent weight loss, weight gain, fever or chills, no change in exercise tolerance     HENT:  Denies any hearing loss, epistaxis, hoarseness, or difficulty speaking.     Eyes: Wears eyeglasses or contact lenses     Respiratory:  Denies dyspnea with exertion,no cough, wheezing, or hemoptysis.     Cardiovascular: Negative for palpations, chest pain, orthopnea, PND, peripheral edema, syncope, or claudication.     Gastrointestinal:  Denies change in bowel habits, dyspepsia, ulcer disease, hematochezia, or melena.  No nausea, no vomiting, no hematemesis, no diarrhea or constipation, no melena      Endocrine: Negative for cold intolerance, heat intolerance, polydipsia, polyphagia and polyuria. Denies any history of weight change, heat/cold intolerance, polydipsia, polyuria     Genitourinary: Negative hor hematuria.      Musculoskeletal: Denies any history of arthritic symptoms or back problems .  No joint pain, joint stiffness, joint swelling, muscle pain, muscle weakness and neck pain    Skin:  Denies any change in hair or nails, rashes, or skin lesions.     Allergic/Immunologic: Negative.  Negative for environmental allergies, food allergies and  immunocompromised state.     Neurological:  Denies any history of recurrent headaches, strokes, TIA, or seizure disorder.     Hematological: Denies excessive bleeding, easy bruising, fatigue, lymphadenopathy and petechiae or any bleeding disorders, or lymphadenopathy.     Psychiatric/Behavioral: Denies any history of depression, substance abuse, or change in cognitive function. Denies any psychomotor reaction or tangential thought.  No depression, homicidal ideations and suicidal ideations    Endocrine: No frequent urination and nocturia, temperature intolerance, weight gain, unintended and weight loss, unintended            Objective:     Objective:  Vitals:    06/02/17 1919   BP:    Pulse:    Resp:    Temp:    SpO2: 94%     .    Body mass index is 21.83 kg/(m^2).           Physical Exam:   General Appearance:    Alert, oriented, cooperative, in no acute distress   Head:    Normocephalic, atraumatic, without obvious abnormality   Eyes:           KAITLIN  Lids and lashes normal, conjunctivae and sclerae normal, no icterus, no pallor   Ears:    Ears appear intact with no abnormalities noted   Throat:   Mucous membranes pink and moist   Neck:   Supple, trachea midline, no carotid bruit, no organomegaly or JVD   Lungs:     Clear to auscultation and percussion, respirations regular, even and Unlabored. No wheezes, rales, rhonchi    Heart:    Regular rhythm and normal rate, normal S1 and S2, no            murmur, no gallop, no rub, no click   Abdomen:     Soft, non-tender, non-distended, no guarding, no rebound tenderness, Normal bowel sounds in all four quadrant, no masses, liver and spleen nonpalpable,    Genitalia:    Deferred   Extremities:   Moves all extremities well, no edema, no cyanosis, no              Redness, no clubbing   Pulses:   Pulses palpable and equal bilaterally   Skin:   Moist and warm. No bleeding, bruising or rash   Neurologic/Psychiatric:   Alert and oriented to person, place, and time.  Motor,  power and tone in upper and lower extremity is grossly intact.  No focal neurological deficits. Normal cognitive function. No psychomotor reaction or tangential thought. No depression, homicidal ideations and suicidal ideations           Lab Review:       Results from last 7 days  Lab Units 06/02/17  1144   SODIUM mmol/L 131*   POTASSIUM mmol/L 4.3   CHLORIDE mmol/L 89*   TOTAL CO2 mmol/L 26.0   BUN mg/dL 41*   CREATININE mg/dL 4.44*   CALCIUM mg/dL 9.1   BILIRUBIN mg/dL 4.1*   ALK PHOS U/L 93   ALT (SGPT) U/L 118*   AST (SGOT) U/L 179*   GLUCOSE mg/dL 79       Results from last 7 days  Lab Units 06/02/17  1144   CK TOTAL U/L 233*   TROPONIN I ng/mL 0.404*           Results from last 7 days  Lab Units 06/02/17  1144   WBC 10*3/mm3 5.64   HEMOGLOBIN g/dL 13.2*   HEMATOCRIT % 39.6   PLATELETS 10*3/mm3 125*       Results from last 7 days  Lab Units 06/02/17  1912 06/02/17  1544 06/02/17  1242   INR  1.33* >13.00* >18.00*       Results from last 7 days  Lab Units 06/02/17  1144   MAGNESIUM mg/dL 2.2           Results from last 7 days  Lab Units 06/02/17  1144   TSH mIU/mL 6.320*       EKG:   ECG/EMG Results (last 24 hours)     Procedure Component Value Units Date/Time    SCANNED EKG [[id number]] Resulted:  06/02/17      Updated:  06/02/17 1213    ECG 12 Lead [[id number]] Collected:  06/02/17 1128     Updated:  06/02/17 1512          Imaging:  Imaging Results (last 24 hours)     Procedure Component Value Units Date/Time    XR Chest 1 View [[id number]] Collected:  06/02/17 1235     Updated:  06/02/17 1252    Narrative:         EXAM:         Radiograph(s), Chest   VIEWS:   Portable ; 1       DATE/TIME: 6/2/2017 12:49 PM CDT               INDICATION:     Weakness      COMPARISON:   CXR: 3/13/17          FINDINGS:           - lines/tubes:     Right approach large caliber central venous  catheter in standard position.     - cardiac:          size within normal limits         - mediastinum:  contour within normal limits         -  lungs:          no focal air space process, pulmonary  interstitial edema, nodule(s)/mass             - pleura:          Tiny left pleural fluid collection of doubtful  clinical significance.                  - osseous:          Status post CABG                  - misc.:        Impression:       CONCLUSION:        1. No evidence of an acute cardiopulmonary process.                      Electronically signed by:  SAROJ Ortega MD  6/2/2017 12:52  PM CDT Workstation: BERHANE CRUZ personally viewed and interpreted the patient's EKG/Telemetry data.    Assessment:   1.  Generalized fatigue weakness weight loss.  2.  Elevated troponin suggestive for non-Q myocardial infarction.  3.  Atherosclerotic coronary artery disease.  4.  Coronary artery bypass grafting.  5.  Positive head up tilt table testing.  6.  Chronic kidney disease currently on hemodialysis.  7.  Paroxysmal atrial fibrillation on anticoagulation with Coumadin          Plan:   1. Chest pain with history of documented atherosclerotic coronary artery disease with previous diffuse native coronary artery disease with subsequent coronary artery bypass grafting done in 2009. Patient has indeterminate troponin. At the present time, patient has been recommended medical management and would not be subjected to any invasive evaluation due to the patient's history of renal insufficiency and chronic kidney disease. Patient's description of the chest discomfort was of a different quality than the one which he had prior to his bypass surgery. Patient will be continued on the present dose of the Ranexa.    2. Paroxysmal atrial fibrillation. Patient is currently in sinus bradycardia. Review of the record indicates patient during the hospitalization in August 2013 was discharged home on Amiodarone. Patient was also evaluated by electrophysiologist for consideration for radio frequency ablation should he have symptoms. Review of the home medications did not  show Amiodarone listed, though it is unclear whether patient is truly taking Amiodarone. Patient had been on amiodarone in the past.  Patient is currently on carvedilol and diltiazem.  Patient had not complained of having any prolonged episodes of palpitation.    3. Moderate tricuspid regurgitation with mild mitral regurgitation. Clinically at the present time patient is not in congestive heart failure.     4. History of positive head up tilt table testing with neurocardiogenic etiology for syncope. Patient has not complained of having any symptoms of lightheaded dizziness or near syncope and patient has been recommended to use the support stockings.     5. History of gouty arthritis as noted. Patient has been followed by Dr. Lopez.     6. Iron deficiency anemia, hiatal hernia, gastritis with history of gastrointestinal bleeding as noted. Patient's hemoglobin is 11.6.     7. Chronic kidney disease , patient is currently on hemodialysis 3 times a week . Patient has been followed by Dr. Sweeney.     8.  Weight loss with elevated total bilirubin.  Patient is being evaluated by Dr. Hoang for the cholelithiasis weight loss.  Patient was recommended an upper endoscopy with the patient had not consented for.    9.  Elevated PT/INR with over anticoagulation with Coumadin.  Patient last PT/INR 1.3.  Patient has not complained of having any bleeding diastasis.      Time: time spent in face-to-face evaluation off greater than 55  minutes and interacting and formulating examining and discussing the plan with the patient with 50% of greater time spent in face-to-face interaction.    Geronimo Jenkins MD  06/02/17  10:20 PM  EMR Dragon/Transcription disclaimer:   Some of this note may be an electronic transcription/translation of spoken language to printed text. The electronic translation of spoken language may permit erroneous, or at times, nonsensical words or phrases to be inadvertently transcribed; Although I have  reviewed the note for such errors, some may still exist.

## 2017-06-03 NOTE — CONSULTS
Miley Lua DO,[facility]  Gastroenterology  Hepatology  Endoscopy  Board Certified in Internal Medicine and gastroenterology  [address], [address]  [place]. [address]  - [phone]   F - [phone]     GASTROENTEROLOGY CONSULT NOTE   MILEY LUA DO.         SUBJECTIVE:   6/2/2017    Name: Roni Middleton  DOD: 12/27/1933    REASON FOR CONSULT: Jaundice in the setting of a known history of gallstones    Chief Complaint:     Chief Complaint   Patient presents with   • Fatigue       Subjective : Evaluate for the possibility of extrahepatic biliary obstruction due to known history of gallstones.    Patient is 83 y.o. male, personal history of advanced chronic kidney injury with associated recurrent nausea and vomiting and severe protein calorie malnutrition with the findings of gallstones that are present on a CT scan approximately one month ago, presents with fatigue, anorexia, inability to eat for one week, inability to get out of bed and the findings of jaundice.    The patient had seen me approximate 2 weeks ago at the request of Dr. Singh.  The patient had a CT scan that was done on the day that I saw him that showed the patient have gallstones.  There was no dilation of the bile duct.  There was no pancreatic head neoplasm.  The patient had normal liver enzyme test.  The patient had recurrent problems with nausea.  He felt that a lot of this was due to the medications that he had been receiving for his chronic renal disease.  I offered to refer him for cholecystectomy which she declined.  I offered to do an EGD for him and he declined.  His wife was adamant that she felt that any test or invasive evaluation or surgery would cause him to die prematurely.    He has lost a proximally 40 pounds according to them.  He is nauseated most of the time.  He has no upper abdominal pain.  He has some lower abdominal crampy discomfort.  He has had no fevers or chills.    The patient was seen in  the emergency department where he was found to have extreme over anticoagulation with no evidence of any active ongoing gastrointestinal bleeding.  The patient was also noted to have volume overload with a positive troponin level as well as CK-MB.  He is going to see cardiology consultants.  He is going to have hemodialysis performed tomorrow.    We went over the patient's clinical problem with him and his wife.  Tonight, he tells me that he wants everything done to include surgery and that if there is something that happened that he would not hold anyone responsible for this because he understands that he is extremely ill.     ROS/HISTORY/ CURRENT MEDICATIONS/OBJECTIVE/VS/PE:   Review of Systems:   Review of Systems   Constitutional: Positive for activity change, appetite change, fatigue and unexpected weight change.   HENT: Positive for hearing loss, sore throat, trouble swallowing and voice change.    Eyes: Negative.    Respiratory: Positive for cough, choking, chest tightness and shortness of breath.    Cardiovascular: Positive for leg swelling.   Gastrointestinal: Positive for abdominal pain and nausea.   Endocrine: Positive for cold intolerance and heat intolerance.   Genitourinary: Positive for difficulty urinating.   Musculoskeletal: Positive for arthralgias, back pain, gait problem, joint swelling, myalgias, neck pain and neck stiffness.   Skin: Positive for color change and pallor.   Allergic/Immunologic: Positive for immunocompromised state.   Neurological: Positive for dizziness, speech difficulty, weakness, light-headedness and headaches.   Hematological: Bruises/bleeds easily.   Psychiatric/Behavioral: The patient is nervous/anxious.        History:     Past Medical History:   Diagnosis Date   • A-fib    • Bleeding ulcer     approx 9 years ago prior to by pass surgery   • BPH (benign prostatic hyperplasia)    • Coagulopathy     coumadin   • Coronary artery disease     pt relates has been told in past  has leaky valve   • Hearing loss    • History of echocardiogram 07/16/2013    flow 69828 (1)    Biatrial enlargement with mild CLVH with normal aortic root size. LV systolic function well preserved with EF of 55-60%. Mitral and AV thickened. Tricuspid valve intact. Diastolic dysfunction.    • Hypertension     since dialysis has placed some of bp meds on hold   • Kidney disease    • Kidney failure    • Macular degeneration    • Macular degeneration    • Nausea & vomiting    • Stroke     mini   • Wears glasses      Past Surgical History:   Procedure Laterality Date   • ARTERIOVENOUS FISTULA/SHUNT SURGERY Left 5/2/2017    Procedure: LEFT BRACHIAL ARTERY TO AXILLARY VEIN  ARTERIOVENOUS GRAFT     (artegraft);  Surgeon: Jovanni Esparza MD;  Location: [facility] OR;  Service:    • BACK SURGERY     • CAROTID ENDARTERECTOMY     • CORONARY ARTERY BYPASS GRAFT     • EYE SURGERY     • FOREIGN BODY REMOVAL Left     foot   • HERNIA REPAIR      ingunial right and left   • INTERVENTIONAL RADIOLOGY PROCEDURE N/A 3/14/2017    Procedure: tunneled central venous catheter placement;  Surgeon: Jovanni Esparza MD;  Location: [facility] LOCATION;  Service:      Family History   Problem Relation Age of Onset   • Heart disease Father    • Alcohol abuse Son      Social History   Substance Use Topics   • Smoking status: Former Smoker     Quit date: 2/5/1970   • Smokeless tobacco: Never Used   • Alcohol use No     Prescriptions Prior to Admission   Medication Sig Dispense Refill Last Dose   • acetaminophen-codeine (TYLENOL #3) 300-30 MG per tablet Take 1 tablet by mouth Every 6 (Six) Hours As Needed for moderate pain (4-6).   Unknown at Unknown time   • carvedilol (COREG) 12.5 MG tablet Take 1 tablet by mouth 2 (Two) Times a Day With Meals. (Patient not taking: Reported on 6/2/2017) 60 tablet 0 Not Taking at Unknown time   • colchicine 0.6 MG tablet Take 0.6 mg by mouth Daily.   Unknown at Unknown time   • diltiaZEM CD  (CARDIZEM CD) 120 MG 24 hr capsule Take 1 capsule by mouth Daily. 30 capsule 0 Unknown at Unknown time   • isosorbide mononitrate (IMDUR) 30 MG 24 hr tablet Take 30 mg by mouth Daily.   Unknown at Unknown time   • Misc Natural Products (BLACK CHERRY CONCENTRATE PO) Take 1 capsule by mouth Daily. For gout (1 capsule = 500 mg)   Unknown at Unknown time   • ranolazine (RANEXA) 500 MG 12 hr tablet Take 500 mg by mouth 2 (Two) Times a Day.   Unknown at Unknown time   • sucralfate (CARAFATE) 1 G tablet Take 1 g by mouth 3 (Three) Times a Day. For 14 days.   Unknown at Unknown time   • warfarin (COUMADIN) 2.5 MG tablet Take one tablet daily except on Monday, take two tablets or as Directed by [facility] 100 tablet 1 Unknown at Unknown time     Allergies:  Flomax [tamsulosin hcl] and Motrin [ibuprofen]    I have reviewed the patients medical history, surgical history and family history in the available medical record system.     Current Medications:     Current Facility-Administered Medications   Medication Dose Route Frequency Provider Last Rate Last Dose   • sodium chloride 0.9 % flush 1-10 mL  1-10 mL Intravenous PRN Paulina Samuel MD       • sodium chloride 0.9 % infusion  125 mL/hr Intravenous Continuous Chivo Fernandez  mL/hr at 06/02/17 1958 125 mL/hr at 06/02/17 1958       Objective     Physical Exam:   Temp:  [96.3 °F (35.7 °C)-97.3 °F (36.3 °C)] 96.3 °F (35.7 °C)  Heart Rate:  [] 127  Resp:  [18] 18  BP: (103-124)/(66-79) 112/74    Physical Exam:  General Appearance:    Alert, cooperative, in no acute distress   Head:    Normocephalic, without obvious abnormality, atraumatic   Eyes:            Lids and lashes normal, conjunctivae and sclerae normal,   icterus, no pallor, corneas clear, PERRLA   Ears:    Ears appear intact with no abnormalities noted   Throat:   No oral lesions, no thrush, oral mucosa moist   Neck:   No adenopathy, supple, trachea midline, no thyromegaly, no      carotid bruit, no JVD   Back:     No kyphosis present, no scoliosis present, no skin lesions,       erythema or scars, no tenderness to percussion or                   palpation,   range of motion normal   Lungs:     Clear to auscultation,respirations regular, even and                   unlabored    Heart:    Regular rhythm and normal rate, normal S1 and S2, no            murmur, no gallop, no rub, no click   Breast Exam:    Deferred   Abdomen:     Normal bowel sounds, no masses, no organomegaly, soft        non-tender, non-distended, no guarding, no rebound                 tenderness   Genitalia:    Deferred   Extremities:   Moves all extremities well, no edema, no cyanosis, no              redness   Pulses:   Pulses palpable and equal bilaterally   Skin:   No bleeding, bruising or rash   Lymph nodes:   No palpable adenopathy   Neurologic:   Cranial nerves 2 - 12 grossly intact, sensation intact, DTR        present and equal bilaterally      Results Review:     Lab Results   Component Value Date    WBC 5.64 06/02/2017    WBC 5.62 05/12/2017    WBC 6.01 03/16/2017    HGB 13.2 (L) 06/02/2017    HGB 12.8 (L) 05/12/2017    HGB 10.7 (L) 03/16/2017    HCT 39.6 06/02/2017    HCT 39.7 05/12/2017    HCT 32.8 (L) 03/16/2017     (L) 06/02/2017     05/12/2017     03/16/2017       Results from last 7 days  Lab Units 06/02/17  1144   ALK PHOS U/L 93   ALT (SGPT) U/L 118*   AST (SGOT) U/L 179*       Results from last 7 days  Lab Units 06/02/17  1144   BILIRUBIN mg/dL 4.1*   ALK PHOS U/L 93     Lipase   Date Value Ref Range Status   06/02/2017 65 23 - 300 U/L Final     Lab Results   Component Value Date    INR 1.33 (H) 06/02/2017    INR >13.00 (C) 06/02/2017    INR >18.00 (C) 06/02/2017          Radiology Review:  Imaging Results (last 72 hours)     Procedure Component Value Units Date/Time    XR Chest 1 View [[id number]] Collected:  06/02/17 1235     Updated:  06/02/17 1252    Narrative:         EXAM:          Radiograph(s), Chest   VIEWS:   Portable ; 1       DATE/TIME: 6/2/2017 12:49 PM CDT               INDICATION:     Weakness      COMPARISON:   CXR: 3/13/17          FINDINGS:           - lines/tubes:     Right approach large caliber central venous  catheter in standard position.     - cardiac:          size within normal limits         - mediastinum:  contour within normal limits         - lungs:          no focal air space process, pulmonary  interstitial edema, nodule(s)/mass             - pleura:          Tiny left pleural fluid collection of doubtful  clinical significance.                  - osseous:          Status post CABG                  - misc.:        Impression:       CONCLUSION:        1. No evidence of an acute cardiopulmonary process.                      Electronically signed by:  SAROJ Ortega MD  6/2/2017 12:52  PM CDT Workstation: [id number]           I reviewed the patient's new clinical results.  I reviewed the patient's new imaging results and agree with the interpretation.     ASSESSMENT/PLAN:   ASSESSMENT:   1.  Jaundice.  Must exclude the possibility of extrahepatic biliary obstruction.  Certainly, common bile duct stones should be considered and because the patient's weight loss, consider pancreatic cancer.  However, with the patient's over anticoagulation, should always consider the possibility of intrahepatic disease  2.  Gallstones with no evidence of cholecystitis  3.  History of bleeding peptic ulcer disease with no evidence of any active ongoing gastrointestinal bleeding, even in light of the patient's extreme over anticoagulation  4.  Protein calorie malnutrition  5.  Weight loss of over 40 pounds  6.  Over anticoagulation  7.  Volume overload, missing hemodialysis    PLAN:   1.  Will need to address the over anticoagulation first  2.  Will need to have normalization of the patient's volume with hemodialysis  3.  Cardiology consultation to ensure that if invasive therapy is  required that this will be reasonable and all precautions taken prior to the procedure  4.  MRI with MRCP  5.  If there is a common bile duct stone that is present, then we will consider endoscopic retrograde cholangiogram with endoscopic sphincterotomy and common bile duct stent.  However, the patient must have normal coagulation panel  6.  Due to the question her about the possibility of pancreatic cancer, I am going to do a CA 19-9 and a CEA level.  If this is markedly elevated, then we may be looking at a neoplastic process especially with the patient's weight loss      Alvaro Hoang DO  06/02/17  8:57 PM

## 2017-06-03 NOTE — CONSULTS
"Adult Nutrition  Assessment    Patient Name:  Deansborolillie Middleton  YOB: 1933  MRN: [id number]  Admit Date:  6/2/2017    Assessment Date:  6/3/2017          Reason for Assessment       06/03/17 1405    Reason for Assessment    Reason For Assessment/Visit admission assessment    Identified At Risk By Screening Criteria unintentional loss of 10 lbs or more in the past 2 mos                Nutrition/Diet History       06/03/17 1406    Nutrition/Diet History    Typical Food/Fluid Intake RN stated pt in dialysis earlier today--in ultra sound or mri  (doing both before returning to floor). Intake very poor due to nausea & illness. Unintentional weight loss of 34+ pounds              Labs/Tests/Procedures/Meds       06/03/17 1411    Labs/Tests/Procedures/Meds    Diagnostic Test/Procedure Review reviewed, pertinent    Significant Vitals reviewed, pertinent              Estimated/Assessed Needs       06/03/17 1412    Calculation Measurements    Weight Used For Calculations 73.5 kg (162 lb)    Height Used for Calculations 1.88 m (6' 2\")    Estimated/Assessed Energy Needs    Energy Need Method Dickson-St Jeor    Age 83    RMR (Dickson-St. Jeor Equation) 1499.58    Activity Factors (Dickson St Jeor)  Confined to bed  1.2    Total estimated needs (Dickson St. Jeor) 1795    Estimated Kcal Range  8034-0650 for weight gain    Estimated/Assessed Protein Needs    Weight Used for Protein Calculation 82 kg (180 lb 12.4 oz)    Protein (gm/kg) 1.2    1.2 Gm Protein (gm) 98.4    Estimated Protein Range             Nutrition Prescription Ordered       06/03/17 1416    Nutrition Prescription PO    Current PO Diet Regular    Common Modifiers Renal              Comments: 83WM admitted due to coumadin toxicity/esrd/N/V/volume overload (due to missed hemodialysis).  At admit, RN documented unintended weight loss of 34+ pounds. Pt very ill. Unable to eat. Assess further as data available.        Electronically signed " by:  Phyllis Campos RD  06/03/17 2:16 PM

## 2017-06-03 NOTE — PROGRESS NOTES
Alvaro Hoang DO,[facility]  Gastroenterology  Hepatology  Endoscopy  Board Certified in Internal Medicine and gastroenterology  [address], [address]  [place]. [address]  - [phone]   F - [phone]     GASTROENTEROLOGY PROGRESS NOTE   ALVARO HOANG DO.         SUBJECTIVE:   6/3/2017  Chief Complaint:     Subjective  : Not in room today and receiving dialysis.  Seen on dialysis.    Patient is 83 y.o. male presents with generalized weakness, nausea, protein calorie malnutrition.    The jaundice continues to be progressive.  No pain.  Has not had the MRI as of yet..      CURRENT MEDICATIONS/OBJECTIVE/VS/PE:     Current Medications:     Current Facility-Administered Medications   Medication Dose Route Frequency Provider Last Rate Last Dose   • acetaminophen-codeine (TYLENOL #3) 300-30 MG per tablet 1 tablet  1 tablet Oral Q6H PRN Paulina Samuel MD       • albumin human 25 % IV SOLN 25 g  25 g Intravenous PRN Ezio Sweeney MD       • carvedilol (COREG) tablet 12.5 mg  12.5 mg Oral BID With Meals Paulina Samuel MD       • colchicine tablet 0.6 mg  0.6 mg Oral Daily Paulina Samuel MD       • diltiaZEM CD (CARDIZEM CD) 24 hr capsule 120 mg  120 mg Oral Daily Paulina Samuel MD       • heparin (porcine) injection 4,100 Units  4,100 Units Intracatheter Daily PRN Ezio Sweeney MD       • isosorbide mononitrate (IMDUR) 24 hr tablet 30 mg  30 mg Oral Daily Paulina Samuel MD       • ranolazine (RANEXA) 12 hr tablet 500 mg  500 mg Oral BID Paulina Samuel MD       • sodium chloride 0.9 % flush 1-10 mL  1-10 mL Intravenous PRN Paulina Samuel MD       • sodium chloride 0.9 % infusion  125 mL/hr Intravenous Continuous Chivo Fernandez  mL/hr at 06/02/17 1958 125 mL/hr at 06/02/17 1958       Objective     Physical Exam:   Temp:  [96.3 °F (35.7 °C)-97.4 °F (36.3 °C)] 97.4 °F (36.3 °C)  Heart Rate:  [] 133  Resp:  [18-20]  20  BP: (103-124)/(66-82) 116/82     Physical Exam:  General Appearance:    Alert, cooperative, in no acute distress-protein calorie malnutrition    Head:    Normocephalic, without obvious abnormality, atraumatic   Eyes:            Lids and lashes normal, conjunctivae and sclerae normal,   icterus, no pallor, corneas clear, PERRLA   Ears:    Ears appear intact with no abnormalities noted   Throat:   No oral lesions, no thrush, oral mucosa moist   Neck:   No adenopathy, supple, trachea midline, no thyromegaly, no     carotid bruit, no JVD   Back:     No kyphosis present, no scoliosis present, no skin lesions,       erythema or scars, no tenderness to percussion or                   palpation,   range of motion normal   Lungs:     Clear to auscultation,respirations regular, even and                   unlabored    Heart:    Regular rhythm and normal rate, normal S1 and S2, no            murmur, no gallop, no rub, no click   Breast Exam:    Deferred   Abdomen:     Normal bowel sounds, no masses, no organomegaly, soft        non-tender, non-distended, no guarding, no rebound                 tenderness   Genitalia:    Deferred   Extremities:   Moves all extremities well, 2+o edema, no cyanosis, no              redness   Pulses:   Pulses palpable and equal bilaterally   Skin:   No bleeding, bruising or rash   Lymph nodes:   No palpable adenopathy   Neurologic:   Cranial nerves 2 - 12 grossly intact, sensation intact, DTR        present and equal bilaterally      Results Review:     Lab Results (last 24 hours)     Procedure Component Value Units Date/Time    Magnesium [[id number]]  (Normal) Collected:  06/02/17 1144    Specimen:  Blood Updated:  06/02/17 1248     Magnesium 2.2 mg/dL     Amylase [[id number]]  (Normal) Collected:  06/02/17 1144    Specimen:  Blood Updated:  06/02/17 1248     Amylase 56 U/L     Lipase [[id number]]  (Normal) Collected:  06/02/17 1144    Specimen:  Blood Updated:  06/02/17 1248     Lipase 65 U/L      CK [[id number]]  (Abnormal) Collected:  06/02/17 1144    Specimen:  Blood Updated:  06/02/17 1248     Creatine Kinase 233 (H) U/L     Comprehensive Metabolic Panel [[id number]]  (Abnormal) Collected:  06/02/17 1144    Specimen:  Blood Updated:  06/02/17 1252     Glucose 79 mg/dL      BUN 41 (H) mg/dL      Creatinine 4.44 (H) mg/dL      Sodium 131 (L) mmol/L      Potassium 4.3 mmol/L      Chloride 89 (L) mmol/L      CO2 26.0 mmol/L      Calcium 9.1 mg/dL      Total Protein 6.3 g/dL      Albumin 3.50 g/dL      ALT (SGPT) 118 (H) U/L      AST (SGOT) 179 (H) U/L      Alkaline Phosphatase 93 U/L      Total Bilirubin 4.1 (H) mg/dL      eGFR Non African Amer 13 (L) mL/min/1.73      Globulin 2.8 gm/dL      A/G Ratio 1.3 g/dL      BUN/Creatinine Ratio 9.2     Anion Gap 16.0 (H) mmol/L     Narrative:       The MDRD GFR formula is only valid for adults with stable renal function between ages 18 and 70.    CBC Auto Differential [[id number]]  (Abnormal) Collected:  06/02/17 1144    Specimen:  Blood Updated:  06/02/17 1253     WBC 5.64 10*3/mm3      RBC 4.22 (L) 10*6/mm3      Hemoglobin 13.2 (L) g/dL      Hematocrit 39.6 %      MCV 93.8 fL      MCH 31.3 pg      MCHC 33.3 g/dL      RDW 23.1 (H) %      RDW-SD 77.1 (H) fl      MPV -- fL       INSTRUMENT UNABLE TO CALCULATE RESULT        Platelets 125 (L) 10*3/mm3      Neutrophil % 77.0 %      Lymphocyte % 11.7 %      Monocyte % 9.6 %      Eosinophil % 0.5 %      Basophil % 0.5 %      Immature Grans % 0.7 (H) %      Neutrophils, Absolute 4.34 10*3/mm3      Lymphocytes, Absolute 0.66 10*3/mm3      Monocytes, Absolute 0.54 10*3/mm3      Eosinophils, Absolute 0.03 10*3/mm3      Basophils, Absolute 0.03 10*3/mm3      Immature Grans, Absolute 0.04 (H) 10*3/mm3      nRBC 0.0 /100 WBC     CK-MB [[id number]]  (Normal) Collected:  06/02/17 1144    Specimen:  Blood Updated:  06/02/17 1258     CKMB 3.34 ng/mL     Troponin [[id number]]  (Abnormal) Collected:  06/02/17 1144    Specimen:  Blood  Updated:  06/02/17 1301     Troponin I 0.404 (C) ng/mL     TSH [[id number]]  (Abnormal) Collected:  06/02/17 1144    Specimen:  Blood Updated:  06/02/17 1317     TSH 6.320 (H) mIU/mL     Protime-INR [[id number]]  (Abnormal) Collected:  06/02/17 1242    Specimen:  Blood Updated:  06/02/17 1323     Protime >120.0 (H) Seconds      INR >18.00 (C)      Results confirmed by recollection       Narrative:       Therapeutic range for most indications is 2.0-3.0 INR,  or 2.5-3.5 for mechanical heart valves.    Extra Tubes [[id number]] Collected:  06/02/17 1243    Specimen:  Blood from Blood, Venous Line Updated:  06/02/17 1401    Narrative:       The following orders were created for panel order Extra Tubes.  Procedure                               Abnormality         Status                     ---------                               -----------         ------                     Green Top (Gel)[[id number]]                                  Final result                 Please view results for these tests on the individual orders.    Green Top (Gel) [[id number]] Collected:  06/02/17 1243    Specimen:  Blood Updated:  06/02/17 1401     Extra Tube Hold for add-ons.      Auto resulted.       CBC & Differential [[id number]] Collected:  06/02/17 1144    Specimen:  Blood Updated:  06/02/17 1434    Narrative:       The following orders were created for panel order CBC & Differential.  Procedure                               Abnormality         Status                     ---------                               -----------         ------                     Scan Slide[[id number]]                                       Final result               CBC Auto Differential[[id number]]        Abnormal            Final result                 Please view results for these tests on the individual orders.    Scan Slide [[id number]] Collected:  06/02/17 1144    Specimen:  Blood Updated:  06/02/17 1434     Anisocytosis Large/3+     Dacrocytes Mod/2+      Polychromasia Slight/1+     Schistocytes Slight/1+     WBC Morphology Normal     Platelet Estimate Decreased     Clumped Platelets --      NONE SEEN       Protime-INR [[id number]]  (Abnormal) Collected:  06/02/17 1544    Specimen:  Blood Updated:  06/02/17 1705     Protime >120.0 (H) Seconds      INR >13.00 (C)    Narrative:       Therapeutic range for most indications is 2.0-3.0 INR,  or 2.5-3.5 for mechanical heart valves.    Protime-INR [[id number]]  (Abnormal) Collected:  06/02/17 1912    Specimen:  Blood Updated:  06/02/17 1953     Protime 16.5 (H) Seconds      INR 1.33 (H)    Narrative:       Therapeutic range for most indications is 2.0-3.0 INR,  or 2.5-3.5 for mechanical heart valves.    Protime-INR [[id number]]  (Abnormal) Collected:  06/03/17 0022    Specimen:  Blood Updated:  06/03/17 0100     Protime 17.7 (H) Seconds      INR 1.46 (H)    Narrative:       Therapeutic range for most indications is 2.0-3.0 INR,  or 2.5-3.5 for mechanical heart valves.    Troponin [[id number]]  (Abnormal) Collected:  06/03/17 0022    Specimen:  Blood Updated:  06/03/17 0115     Troponin I 0.291 (C) ng/mL     Cancer Antigen 19-9 [[id number]] Collected:  06/03/17 0526    Specimen:  Blood Updated:  06/03/17 0551    CEA [[id number]] Collected:  06/03/17 0526    Specimen:  Blood Updated:  06/03/17 0552    CBC Auto Differential [[id number]]  (Abnormal) Collected:  06/03/17 0526    Specimen:  Blood Updated:  06/03/17 0605     WBC 4.94 10*3/mm3      RBC 4.15 (L) 10*6/mm3      Hemoglobin 13.0 (L) g/dL      Hematocrit 39.5 %      MCV 95.2 fL      MCH 31.3 pg      MCHC 32.9 g/dL      RDW 22.6 (H) %      RDW-SD 76.0 (H) fl      MPV -- fL       INSTRUMENT UNABLE TO CALCULATE RESULTS        Platelets 145 (L) 10*3/mm3      Neutrophil % 77.6 %      Lymphocyte % 9.9 (L) %      Monocyte % 10.7 %      Eosinophil % 0.6 %      Basophil % 0.4 %      Immature Grans % 0.8 (H) %      Neutrophils, Absolute 3.83 10*3/mm3      Lymphocytes, Absolute 0.49 (L)  10*3/mm3      Monocytes, Absolute 0.53 10*3/mm3      Eosinophils, Absolute 0.03 10*3/mm3      Basophils, Absolute 0.02 10*3/mm3      Immature Grans, Absolute 0.04 (H) 10*3/mm3     Comprehensive Metabolic Panel [[id number]]  (Abnormal) Collected:  06/03/17 0526    Specimen:  Blood Updated:  06/03/17 0626     Glucose 73 mg/dL      BUN 48 (H) mg/dL      Creatinine 5.21 (H) mg/dL      Sodium 129 (L) mmol/L      Potassium 5.0 mmol/L      Chloride 91 (L) mmol/L      CO2 25.0 mmol/L      Calcium 9.4 mg/dL      Total Protein 6.5 g/dL      Albumin 3.60 g/dL      ALT (SGPT) 125 (H) U/L      AST (SGOT) 204 (H) U/L      Alkaline Phosphatase 104 U/L      Total Bilirubin 5.9 (H) mg/dL      eGFR Non African Amer 11 (L) mL/min/1.73      Globulin 2.9 gm/dL      A/G Ratio 1.2 g/dL      BUN/Creatinine Ratio 9.2     Anion Gap 13.0 mmol/L     Narrative:       The MDRD GFR formula is only valid for adults with stable renal function between ages 18 and 70.    CBC & Differential [[id number]] Collected:  06/03/17 0526    Specimen:  Blood Updated:  06/03/17 0653    Narrative:       The following orders were created for panel order CBC & Differential.  Procedure                               Abnormality         Status                     ---------                               -----------         ------                     Scan Slide[[id number]]                                                                  CBC Auto Differential[[id number]]        Abnormal            Final result                 Please view results for these tests on the individual orders.    Green Top (Gel) [[id number]] Collected:  06/03/17 0554    Specimen:  Blood Updated:  06/03/17 0701     Extra Tube Hold for add-ons.      Auto resulted.       Extra Tubes [[id number]] Collected:  06/03/17 0554    Specimen:  Blood from Blood, Venous Line Updated:  06/03/17 0701    Narrative:       The following orders were created for panel order Extra Tubes.  Procedure                                Abnormality         Status                     ---------                               -----------         ------                     Gold Top - SST[[id number]]                                   Final result               Green Top (Gel)[[id number]]                                  Final result                 Please view results for these tests on the individual orders.    Gold Top - SST [[id number]] Collected:  06/03/17 0554    Specimen:  Blood Updated:  06/03/17 0701     Extra Tube Hold for add-ons.      Auto resulted.       Troponin [[id number]]  (Abnormal) Collected:  06/03/17 0526    Specimen:  Blood Updated:  06/03/17 0739     Troponin I 0.333 (C) ng/mL     Protime-INR [[id number]]  (Abnormal) Collected:  06/03/17 0526    Specimen:  Blood Updated:  06/03/17 0740     Protime 16.0 (H) Seconds      INR 1.28 (H)    Narrative:       Therapeutic range for most indications is 2.0-3.0 INR,  or 2.5-3.5 for mechanical heart valves.           I reviewed the patient's new clinical results.  I reviewed the patient's new imaging results and agree with the interpretation.     ASSESSMENT/PLAN:   ASSESSMENT:   1.  Gallstones with no evidence of cholecystitis  2.  Jaundice.  Consider extrahepatic biliary obstruction due to common bile duct stone.  Also, with the patient's coagulopathy that is profound in the setting of Coumadin, consider intrahepatic disease.  3.  Protein calorie malnutrition    PLAN:   1.  MRI today  2.  Does not need emergency ERCP.  We need to get the patient's medical condition the best as possible with dialysis, reversal of the Coumadin completely and cardiology evaluation      Alvaro Hoang DO  06/03/17  9:58 AM

## 2017-06-03 NOTE — CONSULTS
Nephrology Consultation:    Presenting complaints:  Management of end-stage renal disease    Please see brief note that was entered after evaluating the patient in the emergency room yesterday.  I'm dictating a full consult, late entry.  Patient with history of end-stage liver disease, on hemodialysis.  There is a history of nephrolithiasis, hypertension, chronic kidney disease.  Over the last several weeks has had persistent nausea and vomiting.  He is not been able to eat or keep his medications down.  Imaging studies did not show any acute findings.  Patient was tried on proton pump inhibitors, antiemetics, Carafate.  His heart rate has been noted to be high on dialysis, around , atrial fibrillation.  Patient has been on chronic anticoagulation, followed by the Coumadin clinic and by cardiology.    Patient has had a GI evaluation, Dr. Hoang had kindly evaluated the patient, imaging studies showed gallstone disease.  An endoscopy was advised, patient did not want to proceed with an endoscopy.  He did not want to consider surgical intervention for gallstone disease.    We treated him for possible urinary tract infection several weeks back.  Patient has a history of nephrolithiasis.  The did not have any dysuria or hematuria at this time.    Patient had called the dialysis unit that he was unable to ambulate, and had difficulty coming to the dialysis unit, and came to the emergency room.  I saw and examined him in treatment room D.  He denied any shortness of breath.  Telemetry showed atrial fibrillation at the rate of 124/m.  He does have significant edema of the lower extremities.  Family members were at bedside.  He denied any abdominal pain, but has persistent nausea and intermittent vomiting.    Past medical illness:  Patient Active Problem List   Diagnosis   • Pulmonary embolism   • Long-term (current) use of anticoagulants   • Pneumonia of both lungs due to infectious organism   • CKD (chronic kidney  disease) stage 5, GFR less than 15 ml/min   • Volume overload   • PAF (paroxysmal atrial fibrillation)   • Benign essential HTN   • CKD (chronic kidney disease) stage 5, GFR less than 15 ml/min   • Surgical aftercare, circulatory system   • Coumadin toxicity   • Elevated liver enzymes   • Cholelithiases   • Chronic atrial fibrillation   • ESRD (end stage renal disease) on dialysis     Past Surgical History:   Procedure Laterality Date   • ARTERIOVENOUS FISTULA/SHUNT SURGERY Left 5/2/2017    Procedure: LEFT BRACHIAL ARTERY TO AXILLARY VEIN  ARTERIOVENOUS GRAFT     (artegraft);  Surgeon: Jovanni Esparza MD;  Location: [facility] OR;  Service:    • BACK SURGERY     • CAROTID ENDARTERECTOMY     • CORONARY ARTERY BYPASS GRAFT     • EYE SURGERY     • FOREIGN BODY REMOVAL Left     foot   • HERNIA REPAIR      ingunial right and left   • INTERVENTIONAL RADIOLOGY PROCEDURE N/A 3/14/2017    Procedure: tunneled central venous catheter placement;  Surgeon: Jovanni Esparza MD;  Location: [facility] LOCATION;  Service:        Social history:    Social History     Social History   • Marital status:      Spouse name: N/A   • Number of children: N/A   • Years of education: N/A     Occupational History   • Not on file.     Social History Main Topics   • Smoking status: Former Smoker     Quit date: 2/5/1970   • Smokeless tobacco: Never Used   • Alcohol use No   • Drug use: No      Comment: prescrition   • Sexual activity: Defer     Other Topics Concern   • Not on file     Social History Narrative       Family history:    Family History   Problem Relation Age of Onset   • Heart disease Father    • Alcohol abuse Son        Review of systems:  Positives are mentioned under history of present illness.  There is no chest pain or shortness of breath.  He has atrial fibrillation with rapid ventricular rate, he denies any palpitations at this time.  No bleeding per rectum or melena.  Complains of persistent nausea,  decreased intake.  He has lost a significant amount of weight.  No skin rashes or itching.  No fever.      Medication list:    No current facility-administered medications on file prior to encounter.      Current Outpatient Prescriptions on File Prior to Encounter   Medication Sig Dispense Refill   • acetaminophen-codeine (TYLENOL #3) 300-30 MG per tablet Take 1 tablet by mouth Every 6 (Six) Hours As Needed for moderate pain (4-6).     • carvedilol (COREG) 12.5 MG tablet Take 1 tablet by mouth 2 (Two) Times a Day With Meals. (Patient not taking: Reported on 6/2/2017) 60 tablet 0   • colchicine 0.6 MG tablet Take 0.6 mg by mouth Daily.     • diltiaZEM CD (CARDIZEM CD) 120 MG 24 hr capsule Take 1 capsule by mouth Daily. 30 capsule 0   • isosorbide mononitrate (IMDUR) 30 MG 24 hr tablet Take 30 mg by mouth Daily.     • Misc Natural Products (BLACK CHERRY CONCENTRATE PO) Take 1 capsule by mouth Daily. For gout (1 capsule = 500 mg)     • ranolazine (RANEXA) 500 MG 12 hr tablet Take 500 mg by mouth 2 (Two) Times a Day.     • sucralfate (CARAFATE) 1 G tablet Take 1 g by mouth 3 (Three) Times a Day. For 14 days.     • warfarin (COUMADIN) 2.5 MG tablet Take one tablet daily except on Monday, take two tablets or as Directed by Coumadin Clinic 100 tablet 1     Scheduled Meds:  carvedilol 12.5 mg Oral BID With Meals   colchicine 0.6 mg Oral Daily   diltiaZEM  mg Oral Daily   isosorbide mononitrate 30 mg Oral Daily   ranolazine 500 mg Oral BID     Continuous Infusions:  sodium chloride 125 mL/hr Last Rate: 125 mL/hr (06/02/17 1958)     PRN Meds:•  acetaminophen-codeine  •  albumin human  •  heparin (porcine)  •  sodium chloride    Allergies:  Flomax [tamsulosin hcl] and Motrin [ibuprofen]    On examination:  Vitals:    06/03/17 0500 06/03/17 0600 06/03/17 0714 06/03/17 0725   BP:    116/82   BP Location:    Right arm   Patient Position:    Lying   Pulse: (!) 130 (!) 130 (!) 129 (!) 133   Resp: 18   20   Temp: 97 °F (36.1  °C)   97.4 °F (36.3 °C)   TempSrc: Temporal Artery    Tympanic   SpO2:       Weight:    170 lb 10.2 oz (77.4 kg)   Height:         General:Cachectic, no distress, family at bedside, seen in the emergency room  HEENT: Pallor present no icterus, eye movements are normal  Chest: Clear lungs, no rales or wheezes audible  CVS: Irregular heart sounds, heart rate is 124/m.  No pericardial rub or gallop  Abdomen: Soft, nontender, normal bowel sounds, no organomegaly  Extremities: 2-3+ edema of the lower extremities.  Neuro: Alert and oriented.  No focal motor neurological deficits.  Mentation: Alert and oriented    Past medical illness, social history, medications, previous notes reviewed.       Laboratory tests:  Imaging Results (last 24 hours)     Procedure Component Value Units Date/Time    XR Chest 1 View [[id number]] Collected:  06/02/17 1235     Updated:  06/02/17 1252    Narrative:         EXAM:         Radiograph(s), Chest   VIEWS:   Portable ; 1       DATE/TIME: 6/2/2017 12:49 PM CDT               INDICATION:     Weakness      COMPARISON:   CXR: 3/13/17          FINDINGS:           - lines/tubes:     Right approach large caliber central venous  catheter in standard position.     - cardiac:          size within normal limits         - mediastinum:  contour within normal limits         - lungs:          no focal air space process, pulmonary  interstitial edema, nodule(s)/mass             - pleura:          Tiny left pleural fluid collection of doubtful  clinical significance.                  - osseous:          Status post CABG                  - misc.:        Impression:       CONCLUSION:        1. No evidence of an acute cardiopulmonary process.                      Electronically signed by:  SAROJ Ortega MD  6/2/2017 12:52  PM CDT Workstation: [id number]          Recent Results (from the past 24 hour(s))   Comprehensive Metabolic Panel    Collection Time: 06/02/17 11:44 AM   Result Value Ref Range     Glucose 79 60 - 100 mg/dL    BUN 41 (H) 7 - 21 mg/dL    Creatinine 4.44 (H) 0.70 - 1.30 mg/dL    Sodium 131 (L) 137 - 145 mmol/L    Potassium 4.3 3.5 - 5.1 mmol/L    Chloride 89 (L) 95 - 110 mmol/L    CO2 26.0 22.0 - 31.0 mmol/L    Calcium 9.1 8.4 - 10.2 mg/dL    Total Protein 6.3 6.3 - 8.6 g/dL    Albumin 3.50 3.40 - 4.80 g/dL    ALT (SGPT) 118 (H) 21 - 72 U/L    AST (SGOT) 179 (H) 17 - 59 U/L    Alkaline Phosphatase 93 38 - 126 U/L    Total Bilirubin 4.1 (H) 0.2 - 1.3 mg/dL    eGFR Non  Amer 13 (L) 42 - 98 mL/min/1.73    Globulin 2.8 2.3 - 3.5 gm/dL    A/G Ratio 1.3 1.1 - 1.8 g/dL    BUN/Creatinine Ratio 9.2 7.0 - 25.0    Anion Gap 16.0 (H) 5.0 - 15.0 mmol/L   Amylase    Collection Time: 06/02/17 11:44 AM   Result Value Ref Range    Amylase 56 50 - 130 U/L   Lipase    Collection Time: 06/02/17 11:44 AM   Result Value Ref Range    Lipase 65 23 - 300 U/L   CK    Collection Time: 06/02/17 11:44 AM   Result Value Ref Range    Creatine Kinase 233 (H) 55 - 170 U/L   CK-MB    Collection Time: 06/02/17 11:44 AM   Result Value Ref Range    CKMB 3.34 0.00 - 5.00 ng/mL   Troponin    Collection Time: 06/02/17 11:44 AM   Result Value Ref Range    Troponin I 0.404 (C) <=0.034 ng/mL   Magnesium    Collection Time: 06/02/17 11:44 AM   Result Value Ref Range    Magnesium 2.2 1.6 - 2.3 mg/dL   TSH    Collection Time: 06/02/17 11:44 AM   Result Value Ref Range    TSH 6.320 (H) 0.460 - 4.680 mIU/mL   CBC Auto Differential    Collection Time: 06/02/17 11:44 AM   Result Value Ref Range    WBC 5.64 3.20 - 9.80 10*3/mm3    RBC 4.22 (L) 4.37 - 5.74 10*6/mm3    Hemoglobin 13.2 (L) 13.7 - 17.3 g/dL    Hematocrit 39.6 39.0 - 49.0 %    MCV 93.8 80.0 - 98.0 fL    MCH 31.3 26.5 - 34.0 pg    MCHC 33.3 31.5 - 36.3 g/dL    RDW 23.1 (H) 11.5 - 14.5 %    RDW-SD 77.1 (H) 35.1 - 43.9 fl    MPV  8.0 - 12.0 fL    Platelets 125 (L) 150 - 450 10*3/mm3    Neutrophil % 77.0 37.0 - 80.0 %    Lymphocyte % 11.7 10.0 - 50.0 %    Monocyte % 9.6 0.0 - 12.0  %    Eosinophil % 0.5 0.0 - 7.0 %    Basophil % 0.5 0.0 - 2.0 %    Immature Grans % 0.7 (H) 0.0 - 0.5 %    Neutrophils, Absolute 4.34 2.00 - 8.60 10*3/mm3    Lymphocytes, Absolute 0.66 0.60 - 4.20 10*3/mm3    Monocytes, Absolute 0.54 0.00 - 0.90 10*3/mm3    Eosinophils, Absolute 0.03 0.00 - 0.70 10*3/mm3    Basophils, Absolute 0.03 0.00 - 0.20 10*3/mm3    Immature Grans, Absolute 0.04 (H) 0.00 - 0.02 10*3/mm3    nRBC 0.0 0.0 - 0.0 /100 WBC   Scan Slide    Collection Time: 06/02/17 11:44 AM   Result Value Ref Range    Anisocytosis Large/3+ None Seen    Dacrocytes Mod/2+ None Seen    Polychromasia Slight/1+ None Seen    Schistocytes Slight/1+ None Seen    WBC Morphology Normal Normal    Platelet Estimate Decreased Normal    Clumped Platelets  None Seen   Protime-INR    Collection Time: 06/02/17 12:42 PM   Result Value Ref Range    Protime >120.0 (H) 11.1 - 15.3 Seconds    INR >18.00 (C) 0.80 - 1.20   Green Top (Gel)    Collection Time: 06/02/17 12:43 PM   Result Value Ref Range    Extra Tube Hold for add-ons.    Protime-INR    Collection Time: 06/02/17  3:44 PM   Result Value Ref Range    Protime >120.0 (H) 11.1 - 15.3 Seconds    INR >13.00 (C) 0.80 - 1.20   Protime-INR    Collection Time: 06/02/17  7:12 PM   Result Value Ref Range    Protime 16.5 (H) 11.1 - 15.3 Seconds    INR 1.33 (H) 0.80 - 1.20   Protime-INR    Collection Time: 06/03/17 12:22 AM   Result Value Ref Range    Protime 17.7 (H) 11.1 - 15.3 Seconds    INR 1.46 (H) 0.80 - 1.20   Troponin    Collection Time: 06/03/17 12:22 AM   Result Value Ref Range    Troponin I 0.291 (C) <=0.034 ng/mL   Protime-INR    Collection Time: 06/03/17  5:26 AM   Result Value Ref Range    Protime 16.0 (H) 11.1 - 15.3 Seconds    INR 1.28 (H) 0.80 - 1.20   Troponin    Collection Time: 06/03/17  5:26 AM   Result Value Ref Range    Troponin I 0.333 (C) <=0.034 ng/mL   Comprehensive Metabolic Panel    Collection Time: 06/03/17  5:26 AM   Result Value Ref Range    Glucose 73 60 - 100  mg/dL    BUN 48 (H) 7 - 21 mg/dL    Creatinine 5.21 (H) 0.70 - 1.30 mg/dL    Sodium 129 (L) 137 - 145 mmol/L    Potassium 5.0 3.5 - 5.1 mmol/L    Chloride 91 (L) 95 - 110 mmol/L    CO2 25.0 22.0 - 31.0 mmol/L    Calcium 9.4 8.4 - 10.2 mg/dL    Total Protein 6.5 6.3 - 8.6 g/dL    Albumin 3.60 3.40 - 4.80 g/dL    ALT (SGPT) 125 (H) 21 - 72 U/L    AST (SGOT) 204 (H) 17 - 59 U/L    Alkaline Phosphatase 104 38 - 126 U/L    Total Bilirubin 5.9 (H) 0.2 - 1.3 mg/dL    eGFR Non African Amer 11 (L) >60 mL/min/1.73    Globulin 2.9 2.3 - 3.5 gm/dL    A/G Ratio 1.2 1.1 - 1.8 g/dL    BUN/Creatinine Ratio 9.2 7.0 - 25.0    Anion Gap 13.0 5.0 - 15.0 mmol/L   CBC Auto Differential    Collection Time: 06/03/17  5:26 AM   Result Value Ref Range    WBC 4.94 3.20 - 9.80 10*3/mm3    RBC 4.15 (L) 4.37 - 5.74 10*6/mm3    Hemoglobin 13.0 (L) 13.7 - 17.3 g/dL    Hematocrit 39.5 39.0 - 49.0 %    MCV 95.2 80.0 - 98.0 fL    MCH 31.3 26.5 - 34.0 pg    MCHC 32.9 31.5 - 36.3 g/dL    RDW 22.6 (H) 11.5 - 14.5 %    RDW-SD 76.0 (H) 35.1 - 43.9 fl    MPV  8.0 - 12.0 fL    Platelets 145 (L) 150 - 450 10*3/mm3    Neutrophil % 77.6 37.0 - 80.0 %    Lymphocyte % 9.9 (L) 10.0 - 50.0 %    Monocyte % 10.7 0.0 - 12.0 %    Eosinophil % 0.6 0.0 - 7.0 %    Basophil % 0.4 0.0 - 2.0 %    Immature Grans % 0.8 (H) 0.0 - 0.5 %    Neutrophils, Absolute 3.83 2.00 - 8.60 10*3/mm3    Lymphocytes, Absolute 0.49 (L) 0.60 - 4.20 10*3/mm3    Monocytes, Absolute 0.53 0.00 - 0.90 10*3/mm3    Eosinophils, Absolute 0.03 0.00 - 0.70 10*3/mm3    Basophils, Absolute 0.02 0.00 - 0.20 10*3/mm3    Immature Grans, Absolute 0.04 (H) 0.00 - 0.02 10*3/mm3   Gold Top - SST    Collection Time: 06/03/17  5:54 AM   Result Value Ref Range    Extra Tube Hold for add-ons.    Green Top (Gel)    Collection Time: 06/03/17  5:54 AM   Result Value Ref Range    Extra Tube Hold for add-ons.    ]      Impression:     End-stage renal disease  Coagulopathy, very high INR  Weight loss, persistent nausea and  vomiting  Atrial fibrillation with rapid ventricular rate  History of renal nephrolithiasis  History of coronary artery disease and CABG  Gallstone disease  Abnormal liver function tests  History of gastric peptic ulcer disease    Plan:      ESRD: Patient has been on dialysis on Monday Wednesday and Friday.  Patient is being seen and evaluated in the emergency room.  He is planned for admission for coagulopathy, and evaluation of nausea vomiting and weight loss.  Chest x-ray looks clear.  At this time his heart rate is 124/m, with plans for cardiology evaluation for rate controlled.  His potassium and bicarbonate levels are stable.  No emergent indication for dialysis.  He will be planned for dialysis tomorrow.    Patient has a PermCath in place for dialysis.  His INR is very high.  No signs or symptoms of infection at the PermCath site.  No bleeding.    Persistent nausea and vomiting.  He has lost a significant amount of weight.  GI evaluation is planned.  Previous imaging study showed gallstone disease.  His liver function tests are now high.  Nutritional supplement if tolerated.    Atrial fibrillation with rapid ventricular rate: He has been on anti-granulation.  Patient has been on metoprolol and Cardizem as outpatient.  Cardiology evaluation.    Family asked about getting a wheelchair for getting him to dialysis as outpatient.  Once his acute condition is stable, we'll probably have physical therapy evaluate him.  I have discussed with patient and family in the emergency room.    Ezio Sweeney MD

## 2017-06-04 PROBLEM — T45.511A COUMADIN TOXICITY: Status: RESOLVED | Noted: 2017-01-01 | Resolved: 2017-01-01

## 2017-06-04 PROBLEM — J18.9 PNEUMONIA DUE TO INFECTIOUS ORGANISM: Status: ACTIVE | Noted: 2017-01-01

## 2017-06-04 NOTE — PROGRESS NOTES
FAMILY MEDICINE DAILY PROGRESS NOTE  NAME: Bloomingdale Heber Middleton  : 1933  MRN: [id number]      LOS: 2 days     PROVIDER OF SERVICE: Paulina Samuel MD    Chief Complaint: Pneumonia due to infectious organism    Subjective:     Interval History:  History taken from: patient RN Per the nurse the patient is only alert and oriented and last night he was hypotensive down to 83/55 which he was given fluids and albumin for, his coreg and imdur were also held.    Patient Complaints: Patient complains of SOB and decreased appetite.  Patient Denies:  Nausea, diarrhea, constipation, chest pain or cough.     Review of Systems:   Review of Systems   Constitutional: Positive for appetite change. Negative for diaphoresis, fatigue and fever.   HENT: Negative for congestion, postnasal drip, rhinorrhea, sinus pressure, sneezing, sore throat, tinnitus and voice change.    Eyes: Negative for pain, redness and itching.   Respiratory: Negative for apnea, cough and wheezing.    Gastrointestinal: Negative for abdominal pain, constipation, diarrhea, nausea and vomiting.   Genitourinary: Negative for dysuria and hematuria.   Musculoskeletal: Negative for back pain and neck pain.   Skin: Positive for wound. Negative for rash.       Objective:     Vital Signs  Temp:  [96.2 °F (35.7 °C)-96.8 °F (36 °C)] 96.3 °F (35.7 °C)  Heart Rate:  [113-160] 122  Resp:  [16-18] 16  BP: ()/(55-85) 116/62  Body mass index is 18.87 kg/(m^2).    Physical Exam  Physical Exam   Constitutional: He appears cachectic.   HENT:   Head: Normocephalic and atraumatic.   Cardiovascular: tachycardia with irregularly irregular rhythm and intact distal pulses.  Exam reveals no gallop and no friction rub.    No murmur heard.  Pulmonary/Chest: Effort normal and decreased breath sounds. No respiratory distress. He has no wheezes. He has no rales. He exhibits no tenderness.   Abdominal: Soft. Bowel sounds are normal. He exhibits no distension and no mass.  There is no tenderness. There is no rebound and no guarding. No hernia.   Neurological: He is alert.   Extremities: Decreased b/l lower extremity swelling, decreased erythema of toes, R hand colder than left hand        Medication Review    Current Facility-Administered Medications:   •  acetaminophen-codeine (TYLENOL #3) 300-30 MG per tablet 1 tablet, 1 tablet, Oral, Q6H PRN, Paulina Samuel MD, 1 tablet at 06/04/17 1044  •  albumin human 25 % IV SOLN 25 g, 25 g, Intravenous, PRN, Ezio Sweeney MD  •  azithromycin (ZITHROMAX) tablet 250 mg, 250 mg, Oral, Q24H, Aime Moreira MD  •  carvedilol (COREG) tablet 12.5 mg, 12.5 mg, Oral, BID With Meals, Paulina Samuel MD, 12.5 mg at 06/03/17 1811  •  cefTRIAXone (ROCEPHIN) 1 g/100 mL 0.9% NS (MBP), 1 g, Intravenous, Q24H, Aime Moreira MD, 1 g at 06/03/17 2133  •  colchicine tablet 0.6 mg, 0.6 mg, Oral, Daily, Paulina Samuel MD, 0.6 mg at 06/04/17 1002  •  diltiaZEM CD (CARDIZEM CD) 24 hr capsule 120 mg, 120 mg, Oral, Daily, Paulina Samuel MD, 120 mg at 06/04/17 1002  •  heparin (porcine) injection 4,100 Units, 4,100 Units, Intracatheter, Daily PRN, Ezio Sweeney MD, 4,100 Units at 06/03/17 1040  •  isosorbide mononitrate (IMDUR) 24 hr tablet 30 mg, 30 mg, Oral, Daily, Paulina Samuel MD, 30 mg at 06/03/17 1251  •  magic butt ointment, , Topical, BID, Paulina Samuel MD  •  ranolazine (RANEXA) 12 hr tablet 500 mg, 500 mg, Oral, BID, Paulina Samuel MD, 500 mg at 06/03/17 1811  •  sodium chloride 0.9 % flush 1-10 mL, 1-10 mL, Intravenous, PRN, Paulina Samuel MD     Diagnostic Data    Lab Results (last 24 hours)     Procedure Component Value Units Date/Time    Lactic Acid, Plasma [[id number]]  (Abnormal) Collected:  06/03/17 1306    Specimen:  Blood Updated:  06/03/17 1401     Lactate 2.1 (C) mmol/L     Troponin [[id number]]  (Abnormal) Collected:  06/03/17 1306    Specimen:  Blood  Updated:  06/03/17 1403     Troponin I 0.382 (C) ng/mL     C-reactive Protein [[id number]]  (Abnormal) Collected:  06/03/17 0554    Specimen:  Blood Updated:  06/03/17 1421     C-Reactive Protein 6.10 (H) mg/dL     Troponin [[id number]]  (Abnormal) Collected:  06/03/17 1632    Specimen:  Blood Updated:  06/03/17 1722     Troponin I 0.411 (C) ng/mL     Lactate Acid, Reflex [[id number]]  (Abnormal) Collected:  06/03/17 1632    Specimen:  Blood Updated:  06/03/17 1908     Lactate 2.8 (C) mmol/L     Troponin [[id number]]  (Abnormal) Collected:  06/03/17 2357    Specimen:  Blood Updated:  06/04/17 0111     Troponin I 0.334 (C) ng/mL     Cancer Antigen 19-9 [[id number]]  (Abnormal) Collected:  06/03/17 0526    Specimen:  Blood Updated:  06/04/17 0539     CA 19-9 85 (H) U/mL       Roche ECLIA methodology       Narrative:       Performed at:  [address] [place] [address]  [id number]  : Ernie Martinez PhD, Phone:  [phone]    Comprehensive Metabolic Panel [[id number]]  (Abnormal) Collected:  06/04/17 0716    Specimen:  Blood Updated:  06/04/17 0844     Glucose 81 mg/dL      BUN 44 (H) mg/dL      Creatinine 4.54 (H) mg/dL      Sodium 131 (L) mmol/L      Potassium 4.2 mmol/L      Chloride 90 (L) mmol/L      CO2 22.0 mmol/L      Calcium 8.7 mg/dL      Total Protein 6.0 (L) g/dL      Albumin 3.40 g/dL      ALT (SGPT) 113 (H) U/L      AST (SGOT) 166 (H) U/L      Alkaline Phosphatase 90 U/L      Total Bilirubin 6.3 (H) mg/dL      eGFR Non African Amer 12 (L) mL/min/1.73      Globulin 2.6 gm/dL      A/G Ratio 1.3 g/dL      BUN/Creatinine Ratio 9.7     Anion Gap 19.0 (H) mmol/L     Narrative:       The MDRD GFR formula is only valid for adults with stable renal function between ages 18 and 70.    Protime-INR [[id number]]  (Abnormal) Collected:  06/04/17 0716    Specimen:  Blood Updated:  06/04/17 0848     Protime 16.3 (H) Seconds      INR 1.31 (H)    Narrative:       Therapeutic range for most  indications is 2.0-3.0 INR,  or 2.5-3.5 for mechanical heart valves.    Troponin [[id number]]  (Abnormal) Collected:  06/04/17 0716    Specimen:  Blood Updated:  06/04/17 0857     Troponin I 0.328 (C) ng/mL     CBC Auto Differential [[id number]]  (Abnormal) Collected:  06/04/17 0716    Specimen:  Blood Updated:  06/04/17 0909     WBC 3.33 10*3/mm3      RBC 3.92 (L) 10*6/mm3      Hemoglobin 12.2 (L) g/dL      Hematocrit 37.7 (L) %      MCV 96.2 fL      MCH 31.1 pg      MCHC 32.4 g/dL      RDW 22.6 (H) %      RDW-SD 75.3 (H) fl      MPV -- fL       INSTRUMENT UNABLE TO CALCULATE RESULTS        Platelets 95 (L) 10*3/mm3       SPECIMEN REANALYZED TO CONFIRM RESULTS        Neutrophil % 79.0 %      Lymphocyte % 9.3 (L) %      Monocyte % 9.6 %      Eosinophil % 0.0 %      Basophil % 0.9 %      Immature Grans % 1.2 (H) %      Neutrophils, Absolute 2.63 10*3/mm3      Lymphocytes, Absolute 0.31 (L) 10*3/mm3      Monocytes, Absolute 0.32 10*3/mm3      Eosinophils, Absolute 0.00 10*3/mm3      Basophils, Absolute 0.03 10*3/mm3      Immature Grans, Absolute 0.04 (H) 10*3/mm3     CBC & Differential [[id number]] Collected:  06/04/17 0716    Specimen:  Blood Updated:  06/04/17 1030    Narrative:       The following orders were created for panel order CBC & Differential.  Procedure                               Abnormality         Status                     ---------                               -----------         ------                     Scan Slide[[id number]]                                       Final result               CBC Auto Differential[[id number]]        Abnormal            Final result                 Please view results for these tests on the individual orders.    Scan Slide [[id number]] Collected:  06/04/17 0716    Specimen:  Blood Updated:  06/04/17 1030     Anisocytosis Mod/2+     Dacrocytes --      FEW TEARDROP CELLS        Macrocytes Slight/1+     Polychromasia Slight/1+     WBC Morphology Normal     Platelet Estimate  Decreased    Blood Culture [[id number]]  (Normal) Collected:  06/03/17 2232    Specimen:  Blood from Arm, Right Updated:  06/04/17 1101     Blood Culture No growth at less than 24 hours    Blood Culture [[id number]]  (Normal) Collected:  06/03/17 2243    Specimen:  Blood from Hand, Right Updated:  06/04/17 1101     Blood Culture No growth at less than 24 hours            I reviewed the patient's new clinical results.    Assessment/Plan:     Active Hospital Problems (** Indicates Principal Problem)    Diagnosis Date Noted   • **Pneumonia due to infectious organism [J18.9] 06/04/2017     -azithromycin D 2  -CXR showed bilateral pleural effusions and possible basilar airspace disease/atelectasis     • Elevated liver enzymes [R74.8] 06/02/2017     -f/u RUQ U/S  -Dr. Hoang consulted, agreed to see patient  -MRCP tomorrow morning       • Cholelithiases [K80.20] 06/02/2017     -seen on CT 2 weeks ago  -RUQ ultrasound showes cholelithiasis  -MRCP shows no stones in common bile duct       • Chronic atrial fibrillation [I48.2] 06/02/2017     -continue home meds     • ESRD (end stage renal disease) on dialysis [N18.6, Z99.2] 06/02/2017     Dr. Holguin Consulted will dialyze patient tomorrow  MWF dialysis normally, missed dialysis today     • PAF (paroxysmal atrial fibrillation) [I48.0] 03/09/2017     Continue home medications  Will hold imdur and coreg due to hypotension        Resolved Hospital Problems    Diagnosis Date Noted Date Resolved   • Coumadin toxicity [T45.511A] 06/02/2017 06/04/2017     -Will Hold coumadin  -Vitamin K protocol  -Daily INR  -INR on admission >18         DVT prophylaxis: SCD/RAYRAY  Code status is Conditional Code    Plan for disposition:to be determined      Time: 30 minutes         This document has been electronically signed by Paulina Samuel MD on June 4, 2017 11:24 AM

## 2017-06-04 NOTE — PROGRESS NOTES
LOS: 2 days   Patient Care Team:  Dago Lopez MD as PCP - General    Chief Complaint: Pneumonia due to infectious organism    Subjective     Interval History: Declining and nonverbal    Patient Complaints: none  Patient Denies:  pain  History taken from: chart family RN    Review of Systems:    Review of Systems   Unable to perform ROS: Patient nonverbal       Objective     Vital Signs  Temp:  [96.2 °F (35.7 °C)-97 °F (36.1 °C)] 97 °F (36.1 °C)  Heart Rate:  [113-160] 129  Resp:  [16-18] 18  BP: ()/(55-85) 98/71    Physical Exam:   Physical Exam   Constitutional: He is oriented to person, place, and time. He appears well-developed and well-nourished. He appears cachectic. He appears toxic. He has a sickly appearance. He appears ill. He appears distressed.   HENT:   Head: Normocephalic and atraumatic.   Right Ear: External ear normal.   Left Ear: External ear normal.   Nose: Nose normal.   Mouth/Throat: Oropharynx is clear and moist.   Eyes: Conjunctivae and EOM are normal. Pupils are equal, round, and reactive to light. Right eye exhibits no discharge. Left eye exhibits no discharge. No scleral icterus.   Neck: Normal range of motion. Neck supple. No JVD present. No tracheal deviation present. No thyromegaly present.   Cardiovascular: Regular rhythm, normal heart sounds and intact distal pulses.  Tachycardia present.  Exam reveals no gallop and no friction rub.    No murmur heard.  Pulmonary/Chest: Effort normal. No stridor. No respiratory distress. He has no wheezes. He has rhonchi. He has no rales. He exhibits no tenderness.   Abdominal: Soft. Bowel sounds are normal. He exhibits no distension and no mass. There is no tenderness. There is no rebound and no guarding. No hernia.   Musculoskeletal: Normal range of motion. He exhibits no edema or deformity.   Lymphadenopathy:     He has no cervical adenopathy.   Neurological: He is alert and oriented to person, place, and time. He exhibits normal  muscle tone. Coordination normal.   Skin: Skin is warm and dry. No erythema.   jaundiced   Psychiatric: He has a normal mood and affect. His behavior is normal. Judgment and thought content normal.   Nursing note and vitals reviewed.       Results Review:       Lab Results (last 24 hours)     Procedure Component Value Units Date/Time    Lactic Acid, Plasma [[id number]]  (Abnormal) Collected:  06/03/17 1306    Specimen:  Blood Updated:  06/03/17 1401     Lactate 2.1 (C) mmol/L     Troponin [[id number]]  (Abnormal) Collected:  06/03/17 1306    Specimen:  Blood Updated:  06/03/17 1403     Troponin I 0.382 (C) ng/mL     C-reactive Protein [[id number]]  (Abnormal) Collected:  06/03/17 0554    Specimen:  Blood Updated:  06/03/17 1421     C-Reactive Protein 6.10 (H) mg/dL     Troponin [[id number]]  (Abnormal) Collected:  06/03/17 1632    Specimen:  Blood Updated:  06/03/17 1722     Troponin I 0.411 (C) ng/mL     Lactate Acid, Reflex [[id number]]  (Abnormal) Collected:  06/03/17 1632    Specimen:  Blood Updated:  06/03/17 1908     Lactate 2.8 (C) mmol/L     Troponin [[id number]]  (Abnormal) Collected:  06/03/17 2357    Specimen:  Blood Updated:  06/04/17 0111     Troponin I 0.334 (C) ng/mL     Cancer Antigen 19-9 [[id number]]  (Abnormal) Collected:  06/03/17 0526    Specimen:  Blood Updated:  06/04/17 0539     CA 19-9 85 (H) U/mL       Roche ECLIA methodology       Narrative:       Performed at:  [address] [place] [address]  [id number]  : Ernie Martinez PhD, Phone:  [phone]    Comprehensive Metabolic Panel [[id number]]  (Abnormal) Collected:  06/04/17 0716    Specimen:  Blood Updated:  06/04/17 0844     Glucose 81 mg/dL      BUN 44 (H) mg/dL      Creatinine 4.54 (H) mg/dL      Sodium 131 (L) mmol/L      Potassium 4.2 mmol/L      Chloride 90 (L) mmol/L      CO2 22.0 mmol/L      Calcium 8.7 mg/dL      Total Protein 6.0 (L) g/dL      Albumin 3.40 g/dL      ALT (SGPT) 113 (H) U/L      AST (SGOT)  166 (H) U/L      Alkaline Phosphatase 90 U/L      Total Bilirubin 6.3 (H) mg/dL      eGFR Non African Amer 12 (L) mL/min/1.73      Globulin 2.6 gm/dL      A/G Ratio 1.3 g/dL      BUN/Creatinine Ratio 9.7     Anion Gap 19.0 (H) mmol/L     Narrative:       The MDRD GFR formula is only valid for adults with stable renal function between ages 18 and 70.    Protime-INR [[id number]]  (Abnormal) Collected:  06/04/17 0716    Specimen:  Blood Updated:  06/04/17 0848     Protime 16.3 (H) Seconds      INR 1.31 (H)    Narrative:       Therapeutic range for most indications is 2.0-3.0 INR,  or 2.5-3.5 for mechanical heart valves.    Troponin [[id number]]  (Abnormal) Collected:  06/04/17 0716    Specimen:  Blood Updated:  06/04/17 0857     Troponin I 0.328 (C) ng/mL     CBC Auto Differential [[id number]]  (Abnormal) Collected:  06/04/17 0716    Specimen:  Blood Updated:  06/04/17 0909     WBC 3.33 10*3/mm3      RBC 3.92 (L) 10*6/mm3      Hemoglobin 12.2 (L) g/dL      Hematocrit 37.7 (L) %      MCV 96.2 fL      MCH 31.1 pg      MCHC 32.4 g/dL      RDW 22.6 (H) %      RDW-SD 75.3 (H) fl      MPV -- fL       INSTRUMENT UNABLE TO CALCULATE RESULTS        Platelets 95 (L) 10*3/mm3       SPECIMEN REANALYZED TO CONFIRM RESULTS        Neutrophil % 79.0 %      Lymphocyte % 9.3 (L) %      Monocyte % 9.6 %      Eosinophil % 0.0 %      Basophil % 0.9 %      Immature Grans % 1.2 (H) %      Neutrophils, Absolute 2.63 10*3/mm3      Lymphocytes, Absolute 0.31 (L) 10*3/mm3      Monocytes, Absolute 0.32 10*3/mm3      Eosinophils, Absolute 0.00 10*3/mm3      Basophils, Absolute 0.03 10*3/mm3      Immature Grans, Absolute 0.04 (H) 10*3/mm3     CBC & Differential [[id number]] Collected:  06/04/17 0716    Specimen:  Blood Updated:  06/04/17 1030    Narrative:       The following orders were created for panel order CBC & Differential.  Procedure                               Abnormality         Status                     ---------                                -----------         ------                     Scan Slide[[id number]]                                       Final result               CBC Auto Differential[[id number]]        Abnormal            Final result                 Please view results for these tests on the individual orders.    Scan Slide [[id number]] Collected:  06/04/17 0716    Specimen:  Blood Updated:  06/04/17 1030     Anisocytosis Mod/2+     Dacrocytes --      FEW TEARDROP CELLS        Macrocytes Slight/1+     Polychromasia Slight/1+     WBC Morphology Normal     Platelet Estimate Decreased    Blood Culture [[id number]]  (Normal) Collected:  06/03/17 2232    Specimen:  Blood from Arm, Right Updated:  06/04/17 1101     Blood Culture No growth at less than 24 hours    Blood Culture [[id number]]  (Normal) Collected:  06/03/17 2243    Specimen:  Blood from Hand, Right Updated:  06/04/17 1101     Blood Culture No growth at less than 24 hours          Medication Review:   Current Facility-Administered Medications   Medication Dose Route Frequency Provider Last Rate Last Dose   • acetaminophen-codeine (TYLENOL #3) 300-30 MG per tablet 1 tablet  1 tablet Oral Q6H PRN Paulina Samuel MD   1 tablet at 06/04/17 1044   • albumin human 25 % IV SOLN 25 g  25 g Intravenous PRN Ezio Sweeney MD       • azithromycin (ZITHROMAX) tablet 250 mg  250 mg Oral Q24H Aime Moreira MD       • carvedilol (COREG) tablet 12.5 mg  12.5 mg Oral BID With Meals Paulina Samuel MD   12.5 mg at 06/03/17 1811   • cefTRIAXone (ROCEPHIN) 1 g/100 mL 0.9% NS (MBP)  1 g Intravenous Q24H Aime Moreira MD   1 g at 06/03/17 2133   • colchicine tablet 0.6 mg  0.6 mg Oral Daily Paulina Samuel MD   0.6 mg at 06/04/17 1002   • diltiaZEM CD (CARDIZEM CD) 24 hr capsule 120 mg  120 mg Oral Daily Paulina Samuel MD   120 mg at 06/04/17 1002   • heparin (porcine) injection 4,100 Units  4,100 Units Intracatheter Daily PRN Ezio Sweeney MD    4,100 Units at 06/03/17 1040   • isosorbide mononitrate (IMDUR) 24 hr tablet 30 mg  30 mg Oral Daily Paulina Samuel MD   30 mg at 06/03/17 1251   • magic butt ointment   Topical BID Paulina Samuel MD       • ranolazine (RANEXA) 12 hr tablet 500 mg  500 mg Oral BID Paulina Samuel MD   500 mg at 06/03/17 1811   • sodium chloride 0.9 % flush 1-10 mL  1-10 mL Intravenous PRN Paulina Samuel MD           Assessment/Plan     Principal Problem:    Pneumonia due to infectious organism  Active Problems:    PAF (paroxysmal atrial fibrillation)    Elevated liver enzymes    Cholelithiases    Chronic atrial fibrillation    ESRD (end stage renal disease) on dialysis    Plan    Terminally ill secondary to multi organ failure  Family is considering hospice.      I have examined the patient and reviewed the pertinent diagnostic data. I have discussed the case with the Family Medicine Resident and agree with the assessment and plan of care.    Home Hernandez DO           This document has been electronically signed by Home Hernandez DO on June 4, 2017 12:23 PM

## 2017-06-04 NOTE — PLAN OF CARE
Problem: Patient Care Overview (Adult)  Goal: Plan of Care Review  Outcome: Ongoing (interventions implemented as appropriate)  Goal: Adult Individualization and Mutuality  Outcome: Ongoing (interventions implemented as appropriate)  Goal: Discharge Needs Assessment  Outcome: Ongoing (interventions implemented as appropriate)    Problem: Renal Failure/Kidney Injury, Acute (Adult)  Goal: Signs and Symptoms of Listed Potential Problems Will be Absent or Manageable (Renal Failure/Kidney Injury, Acute)  Outcome: Ongoing (interventions implemented as appropriate)    Problem: Fall Risk (Adult)  Goal: Absence of Falls  Outcome: Ongoing (interventions implemented as appropriate)

## 2017-06-04 NOTE — PROGRESS NOTES
Nephrology Progress Note:    General condition declining.  Patient states that he cannot see well.  Family has decided on hospice, they're planning to take him home tomorrow.  Patient had dialysis yesterday.    Patient Active Problem List   Diagnosis   • Pulmonary embolism   • Long-term (current) use of anticoagulants   • Pneumonia of both lungs due to infectious organism   • CKD (chronic kidney disease) stage 5, GFR less than 15 ml/min   • Volume overload   • PAF (paroxysmal atrial fibrillation)   • Benign essential HTN   • CKD (chronic kidney disease) stage 5, GFR less than 15 ml/min   • Surgical aftercare, circulatory system   • Elevated liver enzymes   • Cholelithiases   • Chronic atrial fibrillation   • ESRD (end stage renal disease) on dialysis   • Pneumonia due to infectious organism       Medications:    azithromycin 250 mg Oral Q24H   carvedilol 12.5 mg Oral BID With Meals   ceftriaxone 1 g Intravenous Q24H   colchicine 0.6 mg Oral Daily   diltiaZEM  mg Oral Daily   isosorbide mononitrate 30 mg Oral Daily   magic butt ointment  Topical BID   ranolazine 500 mg Oral BID        Vitals:    06/04/17 0500 06/04/17 0700 06/04/17 0908 06/04/17 1145   BP:  116/62  98/71   BP Location:  Right arm  Right arm   Patient Position:  Lying  Lying   Pulse:  (!) 123 (!) 122 (!) 129   Resp:  16  18   Temp:  96.3 °F (35.7 °C)  97 °F (36.1 °C)   TempSrc:  Temporal Artery   Temporal Artery    SpO2:  97%     Weight: 147 lb (66.7 kg)      Height:         I/O last 3 completed shifts:  In: 1000 [I.V.:1000]  Out: 3600 [Urine:100; Other:3500]  I/O this shift:  In: 60 [P.O.:60]  Out: -     On examination:  General:Seen and examined, he is cachectic.  Patient states that he cannot see well today.    HEENT: Pallor present, no icterus, eye movements are normal  Chest: Muscle wasting.  At entry is equal bilaterally  CVS: Atrial fibrillation, rate 123/m.  No pericardial rub or gallop  Abdomen: Soft, distended, normal bowel sounds with  no rebound or guarding  Extremities: 2+ edema of the lower extremities.  Neuro: Limited examination, no focal deficits, although with significant weakness and fatigue   Mentation: Alert, states that he cannot see well.    Past medical illness, social history, medications, previous notes reviewed.       Laboratory results:      Recent Results (from the past 24 hour(s))   Troponin    Collection Time: 06/03/17  1:06 PM   Result Value Ref Range    Troponin I 0.382 (C) <=0.034 ng/mL   Lactic Acid, Plasma    Collection Time: 06/03/17  1:06 PM   Result Value Ref Range    Lactate 2.1 (C) 0.5 - 2.0 mmol/L   Troponin    Collection Time: 06/03/17  4:32 PM   Result Value Ref Range    Troponin I 0.411 (C) <=0.034 ng/mL   Lactate Acid, Reflex    Collection Time: 06/03/17  4:32 PM   Result Value Ref Range    Lactate 2.8 (C) 0.5 - 2.0 mmol/L   Blood Culture    Collection Time: 06/03/17 10:32 PM   Result Value Ref Range    Blood Culture No growth at less than 24 hours    Blood Culture    Collection Time: 06/03/17 10:43 PM   Result Value Ref Range    Blood Culture No growth at less than 24 hours    Troponin    Collection Time: 06/03/17 11:57 PM   Result Value Ref Range    Troponin I 0.334 (C) <=0.034 ng/mL   Protime-INR    Collection Time: 06/04/17  7:16 AM   Result Value Ref Range    Protime 16.3 (H) 11.1 - 15.3 Seconds    INR 1.31 (H) 0.80 - 1.20   Troponin    Collection Time: 06/04/17  7:16 AM   Result Value Ref Range    Troponin I 0.328 (C) <=0.034 ng/mL   Comprehensive Metabolic Panel    Collection Time: 06/04/17  7:16 AM   Result Value Ref Range    Glucose 81 60 - 100 mg/dL    BUN 44 (H) 7 - 21 mg/dL    Creatinine 4.54 (H) 0.70 - 1.30 mg/dL    Sodium 131 (L) 137 - 145 mmol/L    Potassium 4.2 3.5 - 5.1 mmol/L    Chloride 90 (L) 95 - 110 mmol/L    CO2 22.0 22.0 - 31.0 mmol/L    Calcium 8.7 8.4 - 10.2 mg/dL    Total Protein 6.0 (L) 6.3 - 8.6 g/dL    Albumin 3.40 3.40 - 4.80 g/dL    ALT (SGPT) 113 (H) 21 - 72 U/L    AST (SGOT) 166  (H) 17 - 59 U/L    Alkaline Phosphatase 90 38 - 126 U/L    Total Bilirubin 6.3 (H) 0.2 - 1.3 mg/dL    eGFR Non  Amer 12 (L) 42 - 98 mL/min/1.73    Globulin 2.6 2.3 - 3.5 gm/dL    A/G Ratio 1.3 1.1 - 1.8 g/dL    BUN/Creatinine Ratio 9.7 7.0 - 25.0    Anion Gap 19.0 (H) 5.0 - 15.0 mmol/L   CBC Auto Differential    Collection Time: 06/04/17  7:16 AM   Result Value Ref Range    WBC 3.33 3.20 - 9.80 10*3/mm3    RBC 3.92 (L) 4.37 - 5.74 10*6/mm3    Hemoglobin 12.2 (L) 13.7 - 17.3 g/dL    Hematocrit 37.7 (L) 39.0 - 49.0 %    MCV 96.2 80.0 - 98.0 fL    MCH 31.1 26.5 - 34.0 pg    MCHC 32.4 31.5 - 36.3 g/dL    RDW 22.6 (H) 11.5 - 14.5 %    RDW-SD 75.3 (H) 35.1 - 43.9 fl    MPV  8.0 - 12.0 fL    Platelets 95 (L) 150 - 450 10*3/mm3    Neutrophil % 79.0 37.0 - 80.0 %    Lymphocyte % 9.3 (L) 10.0 - 50.0 %    Monocyte % 9.6 0.0 - 12.0 %    Eosinophil % 0.0 0.0 - 7.0 %    Basophil % 0.9 0.0 - 2.0 %    Immature Grans % 1.2 (H) 0.0 - 0.5 %    Neutrophils, Absolute 2.63 2.00 - 8.60 10*3/mm3    Lymphocytes, Absolute 0.31 (L) 0.60 - 4.20 10*3/mm3    Monocytes, Absolute 0.32 0.00 - 0.90 10*3/mm3    Eosinophils, Absolute 0.00 0.00 - 0.70 10*3/mm3    Basophils, Absolute 0.03 0.00 - 0.20 10*3/mm3    Immature Grans, Absolute 0.04 (H) 0.00 - 0.02 10*3/mm3   Scan Slide    Collection Time: 06/04/17  7:16 AM   Result Value Ref Range    Anisocytosis Mod/2+ None Seen    Dacrocytes  None Seen    Macrocytes Slight/1+ None Seen    Polychromasia Slight/1+ None Seen    WBC Morphology Normal Normal    Platelet Estimate Decreased Normal   ]    Imaging Results (last 24 hours)     Procedure Component Value Units Date/Time    US Abdomen Limited [[id number]] Collected:  06/03/17 1106     Updated:  06/03/17 1442    Narrative:       DATE OF EXAM: 6/3/2017 11:06 AM CDT    PROCEDURE: US ABDOMEN LIMITED    INDICATION FOR PROCEDURE: 83 years old patient presents for  initial evaluation of transaminitis.    TECHNIQUE: Multiple static grayscale images are  obtained  transabdominally.    COMPARISON:  Renal ultrasound dated March 6, 2017 and CT the  abdomen and pelvis dated May 16, 2017.    FINDINGS: Images of the liver reveal normal homogeneous  echogenicity. No masses are identified..  There are no dilated  intrahepatic biliary ducts. Color Doppler documents hepatopedal  blood flow within the portal vein.    The gallbladder is present. Multiple gallstones are visible.   There is no pericholecystic fluid or gallbladder wall thickening.   Common bile duct measures 6.8 mm  in greatest transverse  dimension.    The right kidney is small in size with echogenic renal parenchyma  without evidence for perinephric fluid or hydronephrosis. The  right kidney measures 8.8 x 5.6 x 4.7 cm.  There are small  right-sided renal cysts within the mid lower pole which measure  1.9 x 1.8 x 2.3 cm. The smaller cyst measures up to 10 mm in  size.    The left kidney was also imaged. Ultrasound reveals only what  appears to be end-stage renal insufficiency and multiple cysts.    The pancreas is obscured by bowel gas..    Abdominal aorta has a mildly irregular contour probably related  to atherosclerotic disease..    Inferior vena cava is not visualized..    No ascites is visualized.    Large right-sided pleural effusion is visualized.      Impression:         1.  Echogenic right-sided renal parenchyma suggests sequela of  chronic renal sufficiency.   2.  Cholelithiasis.  3.  Nonvisualization of pancreas.    Electronically signed by:  Tori Arteaga MD  6/3/2017 2:42 PM CDT  Workstation: [id number]    XR Chest 1 View [[id number]] Collected:  06/03/17 1853     Updated:  06/03/17 1910    Narrative:       PROCEDURE: XR CHEST 1 VIEW    INDICATION FOR PROCEDURE:  83 years -old patient presents for  evaluation of shortness of breath.    COMPARISON:  June 2, 2017.    FINDINGS: XR CHEST 1 view  reveals the lungs are expanded.  Right-sided dialysis catheter is visible within the subclavian  vein and the  tips of the catheters are in the right atrium.  Cardiac monitoring leads are present. Patient has had a  sternotomy.    There is no radiographic evidence for pneumothorax. The cardiac  silhouette is mildly enlarged. Mediastinal silhouette is within  normal limits. There are vascular calcifications of the thoracic  aorta. There is bilateral basilar airspace consolidation and  atelectasis. There are pleural effusions.     The skeletal structures are within normal limits.       Impression:         1.  Cardiomegaly with mild pulmonary congestion.   2.  Bilateral pleural effusions and possible bilateral basilar  airspace disease and/or atelectasis.    Electronically signed by:  Tori Arteaga MD  6/3/2017 7:09 PM CDT  Workstation: [id number]    MRI abdomen wo contrast mrcp [[id number]] Collected:  06/03/17 1143     Updated:  06/04/17 0840    Narrative:       Patient Name:  MR. SARITA SMITH  Patient ID:  [id number]   Ordering:  DANIS ARRIETA  Attending:  DANIS ARRIETA  Referring:  DANIS ARRIETA  ------------------------------------------------    MRI of the abdomen without contrast and including MRCP    HISTORY: Transaminitis. Cholelithiasis. Poisoning by  anticoagulants.    Multisequence multiplanar images of the abdomen were obtained  without contrast. MRCP performed including 3-D reconstructions.    COMPARISON: None. Correlation with CT May 16, 2017 and ultrasound  Nena 3, 2017.    Minimal cardiomegaly.  Moderate-sized right pleural effusion with associated compressive  atelectasis right lower lobe.  Small left pleural effusion.    Cholelithiasis.  No choledocholithiasis.  No intrahepatic or extrahepatic biliary dilatation.  No stricture of the common bile duct.  No pancreatic mass.  Unremarkable liver and spleen.  No adrenal mass.  1 cm calculus right renal pelvis.  Minimal right hydronephrosis with peripelvic stranding.  Small right renal cysts.  Left kidney entirely replaced with cysts, largest 10.5  cm.    No abdominal aortic aneurysm.  No adenopathy.  Very minimal free fluid anterior to the liver.  Dependent subcutaneous edema.  No bowel obstruction.      Impression:       CONCLUSION:  Minimal cardiomegaly.  Moderate-sized right pleural effusion with associated compressive  atelectasis right lower lobe.  Small left pleural effusion.  Cholelithiasis.  No choledocholithiasis.  No intrahepatic or extrahepatic biliary dilatation.  No stricture of the common bile duct.  1 cm calculus right renal pelvis.  Minimal right hydronephrosis with peripelvic stranding.  Small right renal cysts.  Left kidney entirely replaced with cysts, largest 10.5 cm.  Very minimal free fluid anterior to the liver.  Dependent subcutaneous edema.    [id number]    Electronically signed by:  Abisai Arteaga MD  6/4/2017 8:40 AM CDT  Workstation: [email or web address]        Assessment:     End-stage disease  Weight loss, persistent nausea and vomiting  Abnormal liver function tests, hyperbilirubinemia, Worsening  Coagulopathy  Atrial fibrillation with rapid ventricular rate  Essential hypertension  History of nephrolithiasis and urinary tract infections    Plan:     ESRD: Patient is usually on dialysis on Monday Wednesday and Friday.   Patient had dialysis on Saturday.  Family is planning to take him home with hospice.  He has a PermCath in place.  I discussed the option of removing the PermCath, at this time we will leave it in.   told me that home nurses can do the dressings.    Abnormal liver function tests, hyperbilirubinemia: Gastroneurology notes were reviewed.  Progressive disease, worsening.  Family has opted for hospice.    Coagulopathy: Anticoagulation is on hold.  Follow INR.  No bleeding manifestations.      Atrial fibrillation: Cardiology has evaluated.  Heart rate continues to be high.    Patient is planned to go to hospice tomorrow.  I will sign off.  I have discussed with patient and family.  Please call me with any questions  or concerns.      Ezio Sweeney MD

## 2017-06-04 NOTE — DISCHARGE PLACEMENT REQUEST
"Sarita Middleton (83 y.o. Male)     Date of Birth Social Security Number Address Home Phone MRN    1933  [address]  [facility] [address] [phone] [id number]    Yarsani Marital Status          None        Admission Date Admission Type Admitting Provider Attending Provider Department, Room/Bed    17 Home Traylor DO Saunders, Stacey Dianne, MD [address], 311/1    Discharge Date Discharge Disposition Discharge Destination                      Attending Provider: Paulina Samuel MD     Allergies:  Flomax [Tamsulosin Hcl], Motrin [Ibuprofen]    Isolation:  None   Infection:  None   Code Status:  Conditional    Ht:  74\" (188 cm)   Wt:  147 lb (66.7 kg)    Admission Cmt:  None   Principal Problem:  Coumadin toxicity [T45.511A] More...                 Active Insurance as of 2017     Primary Coverage     Payor Plan Insurance Group Employer/Plan Group    HUMANA MEDICARE REPLACEMENT HUMANA MEDICARE REPL [id number]     Payor Plan Address Payor Plan Phone Number Effective From Effective To    [address] [address] [phone] 2016     [place] [address]       Subscriber Name Subscriber Birth Date Member ID       SARITA MIDDLETON 1933 [id number]                 Emergency Contacts      (Rel.) Home Phone Work Phone Mobile Phone    Madai Middleton (Spouse) [phone] -- [phone]               History & Physical      Home Hernandez DO at 2017  2:53 PM              HISTORY AND PHYSICAL  NAME: Sarita Middleton  : 1933  MRN: [id number]    DATE OF ADMISSION: 17    DATE & TIME SEEN: 17 6:45 PM    PCP: Dago Lopez MD    CODE STATUS: Prior    CHIEF COMPLAINT Increasing weakness.     HPI:  Sarita Middleton is a 83 y.o. male who presents with increased generalized weakness. Family was trying to get him to go doctor today and could not raise arms or hold his own body weight " up.  This has been getting progressively worse for the past 4 days.  Last week he was able to walk.  Today they were trying to get to the [facility].  Patient went to the coumadin clinic 5/24/17.  Recently he has had to go weekly as he has been having problems controlling it recently.     CONCURRENT MEDICAL HISTORY:  Past Medical History:   Diagnosis Date   • A-fib    • Bleeding ulcer     approx 9 years ago prior to by pass surgery   • BPH (benign prostatic hyperplasia)    • Coagulopathy     coumadin   • Coronary artery disease     pt relates has been told in past has leaky valve   • Hearing loss    • History of echocardiogram 07/16/2013    flow 60922 (1)    Biatrial enlargement with mild CLVH with normal aortic root size. LV systolic function well preserved with EF of 55-60%. Mitral and AV thickened. Tricuspid valve intact. Diastolic dysfunction.    • Hypertension     since dialysis has placed some of bp meds on hold   • Kidney disease    • Kidney failure    • Macular degeneration    • Macular degeneration    • Nausea & vomiting    • Stroke     mini   • Wears glasses        PAST SURGICAL HISTORY:  Past Surgical History:   Procedure Laterality Date   • ARTERIOVENOUS FISTULA/SHUNT SURGERY Left 5/2/2017    Procedure: LEFT BRACHIAL ARTERY TO AXILLARY VEIN  ARTERIOVENOUS GRAFT     (artegraft);  Surgeon: Jovanni Esparza MD;  Location: [facility] OR;  Service:    • BACK SURGERY     • CAROTID ENDARTERECTOMY     • CORONARY ARTERY BYPASS GRAFT     • EYE SURGERY     • FOREIGN BODY REMOVAL Left     foot   • HERNIA REPAIR      ingunial right and left   • INTERVENTIONAL RADIOLOGY PROCEDURE N/A 3/14/2017    Procedure: tunneled central venous catheter placement;  Surgeon: Jovanni Esparza MD;  Location: [facility]GIO INVASIVE LOCATION;  Service:        FAMILY HISTORY:  Family History   Problem Relation Age of Onset   • Heart disease Father    • Alcohol abuse Son         SOCIAL HISTORY:  Social History     Social History    • Marital status:      Spouse name: N/A   • Number of children: N/A   • Years of education: N/A     Occupational History   • Not on file.     Social History Main Topics   • Smoking status: Former Smoker     Quit date: 2/5/1970   • Smokeless tobacco: Never Used   • Alcohol use No   • Drug use: No      Comment: prescrition   • Sexual activity: Defer     Other Topics Concern   • Not on file     Social History Narrative       HOME MEDICATIONS:    Current Facility-Administered Medications:   •  sodium chloride 0.9 % infusion, 125 mL/hr, Intravenous, Continuous, Chivo Fernandez MD, Last Rate: 125 mL/hr at 06/02/17 1319, 125 mL/hr at 06/02/17 1319    Current Outpatient Prescriptions:   •  acetaminophen-codeine (TYLENOL #3) 300-30 MG per tablet, Take 1 tablet by mouth Every 6 (Six) Hours As Needed for moderate pain (4-6)., Disp: , Rfl:   •  carvedilol (COREG) 12.5 MG tablet, Take 1 tablet by mouth 2 (Two) Times a Day With Meals. (Patient not taking: Reported on 6/2/2017), Disp: 60 tablet, Rfl: 0  •  colchicine 0.6 MG tablet, Take 0.6 mg by mouth Daily., Disp: , Rfl:   •  diltiaZEM CD (CARDIZEM CD) 120 MG 24 hr capsule, Take 1 capsule by mouth Daily., Disp: 30 capsule, Rfl: 0  •  isosorbide mononitrate (IMDUR) 30 MG 24 hr tablet, Take 30 mg by mouth Daily., Disp: , Rfl:   •  Misc Natural Products (BLACK CHERRY CONCENTRATE PO), Take 1 capsule by mouth Daily. For gout (1 capsule = 500 mg), Disp: , Rfl:   •  ranolazine (RANEXA) 500 MG 12 hr tablet, Take 500 mg by mouth 2 (Two) Times a Day., Disp: , Rfl:   •  sucralfate (CARAFATE) 1 G tablet, Take 1 g by mouth 3 (Three) Times a Day. For 14 days., Disp: , Rfl:   •  warfarin (COUMADIN) 2.5 MG tablet, Take one tablet daily except on Monday, take two tablets or as Directed by Coumadin Clinic, Disp: 100 tablet, Rfl: 1    ALLERGIES:  Flomax [tamsulosin hcl] and Motrin [ibuprofen]    REVIEW OF SYSTEMS  Review of Systems   Constitutional: Positive for appetite change  (throws up most of his food for the past month) and fatigue. Negative for chills, diaphoresis, fever and unexpected weight change.   HENT: Positive for hearing loss, sore throat and tinnitus. Negative for congestion, rhinorrhea, sinus pressure and sneezing.    Eyes: Positive for itching. Negative for pain and redness.   Respiratory: Positive for cough (non productive) and shortness of breath. Negative for chest tightness.    Cardiovascular: Positive for leg swelling. Negative for chest pain.   Gastrointestinal: Positive for constipation (no bowel movement in 5 days), nausea and vomiting (mainly dry heaves). Negative for abdominal pain, blood in stool and diarrhea.   Endocrine: Negative.    Genitourinary: Negative for dysuria and hematuria.   Skin: Positive for wound (coccyx pressure deterioration). Negative for rash.   Neurological: Positive for weakness. Negative for dizziness, syncope, light-headedness and numbness.   Psychiatric/Behavioral: Positive for confusion and hallucinations (for the past month). Negative for suicidal ideas.       PHYSICAL EXAM:  Temp:  [97.3 °F (36.3 °C)] 97.3 °F (36.3 °C)  Heart Rate:  [] 128  Resp:  [18] 18  BP: (103-124)/(66-79) 123/79  Body mass index is 21.18 kg/(m^2).  Physical Exam   Constitutional: He is oriented to person, place, and time. He appears cachectic. He is active.   HENT:   Head: Normocephalic and atraumatic.   Eyes: No scleral icterus.   Cardiovascular: An irregularly irregular rhythm present. Tachycardia present.  Exam reveals no gallop and no friction rub.    No murmur heard.  Pulses:       Carotid pulses are 3+ on the right side       Radial pulses are 2+ on the right side, and 2+ on the left side.        Dorsalis pedis pulses are 1+ on the right side, and 1+ on the left side.   Pulmonary/Chest: Effort normal and breath sounds normal. No respiratory distress. He has no wheezes. He has no rales. He exhibits no tenderness.   Abdominal: Soft. Bowel sounds are  normal. He exhibits no distension and no mass. There is no tenderness. There is no rebound and no guarding. No hernia.   Neurological: He is alert and oriented to person, place, and time.       DIAGNOSTIC DATA:   Lab Results (last 24 hours)     Procedure Component Value Units Date/Time    Magnesium [[id number]]  (Normal) Collected:  06/02/17 1144    Specimen:  Blood Updated:  06/02/17 1248     Magnesium 2.2 mg/dL     Amylase [[id number]]  (Normal) Collected:  06/02/17 1144    Specimen:  Blood Updated:  06/02/17 1248     Amylase 56 U/L     Lipase [[id number]]  (Normal) Collected:  06/02/17 1144    Specimen:  Blood Updated:  06/02/17 1248     Lipase 65 U/L     CK [[id number]]  (Abnormal) Collected:  06/02/17 1144    Specimen:  Blood Updated:  06/02/17 1248     Creatine Kinase 233 (H) U/L     Comprehensive Metabolic Panel [[id number]]  (Abnormal) Collected:  06/02/17 1144    Specimen:  Blood Updated:  06/02/17 1252     Glucose 79 mg/dL      BUN 41 (H) mg/dL      Creatinine 4.44 (H) mg/dL      Sodium 131 (L) mmol/L      Potassium 4.3 mmol/L      Chloride 89 (L) mmol/L      CO2 26.0 mmol/L      Calcium 9.1 mg/dL      Total Protein 6.3 g/dL      Albumin 3.50 g/dL      ALT (SGPT) 118 (H) U/L      AST (SGOT) 179 (H) U/L      Alkaline Phosphatase 93 U/L      Total Bilirubin 4.1 (H) mg/dL      eGFR Non African Amer 13 (L) mL/min/1.73      Globulin 2.8 gm/dL      A/G Ratio 1.3 g/dL      BUN/Creatinine Ratio 9.2     Anion Gap 16.0 (H) mmol/L     Narrative:       The MDRD GFR formula is only valid for adults with stable renal function between ages 18 and 70.    CBC Auto Differential [[id number]]  (Abnormal) Collected:  06/02/17 1144    Specimen:  Blood Updated:  06/02/17 1253     WBC 5.64 10*3/mm3      RBC 4.22 (L) 10*6/mm3      Hemoglobin 13.2 (L) g/dL      Hematocrit 39.6 %      MCV 93.8 fL      MCH 31.3 pg      MCHC 33.3 g/dL      RDW 23.1 (H) %      RDW-SD 77.1 (H) fl      MPV -- fL       INSTRUMENT UNABLE TO CALCULATE RESULT         Platelets 125 (L) 10*3/mm3      Neutrophil % 77.0 %      Lymphocyte % 11.7 %      Monocyte % 9.6 %      Eosinophil % 0.5 %      Basophil % 0.5 %      Immature Grans % 0.7 (H) %      Neutrophils, Absolute 4.34 10*3/mm3      Lymphocytes, Absolute 0.66 10*3/mm3      Monocytes, Absolute 0.54 10*3/mm3      Eosinophils, Absolute 0.03 10*3/mm3      Basophils, Absolute 0.03 10*3/mm3      Immature Grans, Absolute 0.04 (H) 10*3/mm3      nRBC 0.0 /100 WBC     CK-MB [[id number]]  (Normal) Collected:  06/02/17 1144    Specimen:  Blood Updated:  06/02/17 1258     CKMB 3.34 ng/mL     Troponin [[id number]]  (Abnormal) Collected:  06/02/17 1144    Specimen:  Blood Updated:  06/02/17 1301     Troponin I 0.404 (C) ng/mL     TSH [[id number]]  (Abnormal) Collected:  06/02/17 1144    Specimen:  Blood Updated:  06/02/17 1317     TSH 6.320 (H) mIU/mL     Protime-INR [[id number]]  (Abnormal) Collected:  06/02/17 1242    Specimen:  Blood Updated:  06/02/17 1323     Protime >120.0 (H) Seconds      INR >18.00 (C)      Results confirmed by recollection       Narrative:       Therapeutic range for most indications is 2.0-3.0 INR,  or 2.5-3.5 for mechanical heart valves.    Extra Tubes [[id number]] Collected:  06/02/17 1243    Specimen:  Blood from Blood, Venous Line Updated:  06/02/17 1401    Narrative:       The following orders were created for panel order Extra Tubes.  Procedure                               Abnormality         Status                     ---------                               -----------         ------                     Green Top (Gel)[[id number]]                                  Final result                 Please view results for these tests on the individual orders.    Green Top (Gel) [[id number]] Collected:  06/02/17 1243    Specimen:  Blood Updated:  06/02/17 1401     Extra Tube Hold for add-ons.      Auto resulted.       CBC & Differential [[id number]] Collected:  06/02/17 1144    Specimen:  Blood Updated:  06/02/17 1437     Narrative:       The following orders were created for panel order CBC & Differential.  Procedure                               Abnormality         Status                     ---------                               -----------         ------                     Scan Slide[[id number]]                                       Final result               CBC Auto Differential[[id number]]        Abnormal            Final result                 Please view results for these tests on the individual orders.    Scan Slide [[id number]] Collected:  06/02/17 1144    Specimen:  Blood Updated:  06/02/17 1434     Anisocytosis Large/3+     Dacrocytes Mod/2+     Polychromasia Slight/1+     Schistocytes Slight/1+     WBC Morphology Normal     Platelet Estimate Decreased     Clumped Platelets --      NONE SEEN       Protime-INR [[id number]]  (Abnormal) Collected:  06/02/17 1544    Specimen:  Blood Updated:  06/02/17 1705     Protime >120.0 (H) Seconds      INR >13.00 (C)    Narrative:       Therapeutic range for most indications is 2.0-3.0 INR,  or 2.5-3.5 for mechanical heart valves.           Imaging Results (last 24 hours)     Procedure Component Value Units Date/Time    XR Chest 1 View [[id number]] Collected:  06/02/17 1235     Updated:  06/02/17 1252    Narrative:         EXAM:         Radiograph(s), Chest   VIEWS:   Portable ; 1       DATE/TIME: 6/2/2017 12:49 PM CDT               INDICATION:     Weakness      COMPARISON:   CXR: 3/13/17          FINDINGS:           - lines/tubes:     Right approach large caliber central venous  catheter in standard position.     - cardiac:          size within normal limits         - mediastinum:  contour within normal limits         - lungs:          no focal air space process, pulmonary  interstitial edema, nodule(s)/mass             - pleura:          Tiny left pleural fluid collection of doubtful  clinical significance.                  - osseous:          Status post CABG                  -  misc.:        Impression:       CONCLUSION:        1. No evidence of an acute cardiopulmonary process.                      Electronically signed by:  SAROJ Ortega MD  6/2/2017 12:52  PM CDT Workstation: [id number]          I reviewed the patient's new clinical results.    ASSESSMENT AND PLAN: This is a 83 y.o. male with:    Active Hospital Problems (** Indicates Principal Problem)    Diagnosis Date Noted   • Coumadin toxicity [T45.511A] 06/02/2017     -Will Hold coumadin  -Vitamin K protocol  -Daily INR  -INR on admission >18     • Elevated liver enzymes [R74.8] 06/02/2017     -f/u RUQ U/S  -Dr. Hoang consulted, agreed to see patient  -MRCP tomorrow morning       • Cholelithiases [K80.20] 06/02/2017     -seen on CT 2 weeks ago  -RUQ ultrasound     • Chronic atrial fibrillation [I48.2] 06/02/2017     -continue home meds     • ESRD (end stage renal disease) on dialysis [N18.6, Z99.2] 06/02/2017     Dr. Holguin Consulted will dialyze patient tomorrow  MWF dialysis normally, missed dialysis today     • PAF (paroxysmal atrial fibrillation) [I48.0] 03/09/2017     Continue home medications        Resolved Hospital Problems    Diagnosis Date Noted Date Resolved   No resolved problems to display.       DVT prophylaxis: SCD/RAYRAY  Code status is Prior DNI  BOAZ # [id number], reviewed and consistent with patient reported medications.      I discussed the patients findings and my recommendations with patient and family.     Dr. Hernandez is the attending on record at time of admission, he is aware of the patient's status and agrees with the above history and physical.          This document has been electronically signed by Paulina Samuel MD on June 2, 2017 6:45 PM      I have examined the patient and reviewed the pertinent diagnostic data. I have discussed the case with the Family Medicine Resident and agree with the assessment and plan of care.    Home Hernandez, DO           This document has been  "electronically signed by Home Hernandez DO on Nena 3, 2017 2:04 PM       Electronically signed by Home Hernandez DO at 6/3/2017  2:04 PM      Home Hernandez DO at 2017  5:29 PM              HISTORY AND PHYSICAL  NAME: Roni Middleton  : 1933  MRN: [id number]    DATE OF ADMISSION: 17    DATE & TIME SEEN: 17 5:38 PM    PCP: Dago Lopez MD    CODE STATUS: Full    CHIEF COMPLAINT fatigue, anorexia, listlessness for past few days.    HPI:      Roni Middleton is a 83 y.o. male who presents with several days of \"feeling bad\". He is not eating and has very little energy. He spends most of his time sleeping. He cannot support his own weight.    CONCURRENT MEDICAL HISTORY:  Past Medical History:   Diagnosis Date   • A-fib    • Bleeding ulcer     approx 9 years ago prior to by pass surgery   • BPH (benign prostatic hyperplasia)    • Coagulopathy     coumadin   • Coronary artery disease     pt relates has been told in past has leaky valve   • Hearing loss    • History of echocardiogram 2013    flow 14440 (1)    Biatrial enlargement with mild CLVH with normal aortic root size. LV systolic function well preserved with EF of 55-60%. Mitral and AV thickened. Tricuspid valve intact. Diastolic dysfunction.    • Hypertension     since dialysis has placed some of bp meds on hold   • Kidney disease    • Kidney failure    • Macular degeneration    • Macular degeneration    • Nausea & vomiting    • Stroke     mini   • Wears glasses        PAST SURGICAL HISTORY:  Past Surgical History:   Procedure Laterality Date   • ARTERIOVENOUS FISTULA/SHUNT SURGERY Left 2017    Procedure: LEFT BRACHIAL ARTERY TO AXILLARY VEIN  ARTERIOVENOUS GRAFT     (artegraft);  Surgeon: Jovanni Esparza MD;  Location: [facility];  Service:    • BACK SURGERY     • CAROTID ENDARTERECTOMY     • CORONARY ARTERY BYPASS GRAFT     • EYE SURGERY     • FOREIGN BODY REMOVAL Left     foot   • HERNIA REPAIR "      ingunial right and left   • INTERVENTIONAL RADIOLOGY PROCEDURE N/A 3/14/2017    Procedure: tunneled central venous catheter placement;  Surgeon: Jovanni Esparza MD;  Location: [facility] LOCATION;  Service:        FAMILY HISTORY:  Family History   Problem Relation Age of Onset   • Heart disease Father    • Alcohol abuse Son         SOCIAL HISTORY:  Social History     Social History   • Marital status:      Spouse name: N/A   • Number of children: N/A   • Years of education: N/A     Occupational History   • Not on file.     Social History Main Topics   • Smoking status: Former Smoker     Quit date: 2/5/1970   • Smokeless tobacco: Never Used   • Alcohol use No   • Drug use: No      Comment: prescrition   • Sexual activity: Defer     Other Topics Concern   • Not on file     Social History Narrative       HOME MEDICATIONS:    Current Facility-Administered Medications:   •  prothrombin complex conc human (KCentra) IV solution 3,826 Units, 3,826 Units, Intravenous, Once **AND** phytonadione (AQUA-MEPHYTON, VITAMIN K) 10 mg in sodium chloride 0.9 % 50 mL IVPB, 10 mg, Intravenous, Once **AND** Protime-INR, , , Once **AND** Protime-INR, , , Once **AND** [START ON 6/3/2017] Protime-INR, , , Once **AND** Monitor for signs for thromboembolic events, , , Continuous, Chivo Fernandez MD  •  sodium chloride 0.9 % infusion, 125 mL/hr, Intravenous, Continuous, Chivo Fernandez MD, Last Rate: 125 mL/hr at 06/02/17 1319, 125 mL/hr at 06/02/17 1319    Current Outpatient Prescriptions:   •  acetaminophen-codeine (TYLENOL #3) 300-30 MG per tablet, Take 1 tablet by mouth Every 6 (Six) Hours As Needed for moderate pain (4-6)., Disp: , Rfl:   •  carvedilol (COREG) 12.5 MG tablet, Take 1 tablet by mouth 2 (Two) Times a Day With Meals. (Patient not taking: Reported on 6/2/2017), Disp: 60 tablet, Rfl: 0  •  colchicine 0.6 MG tablet, Take 0.6 mg by mouth Daily., Disp: , Rfl:   •  diltiaZEM CD (CARDIZEM CD)  120 MG 24 hr capsule, Take 1 capsule by mouth Daily., Disp: 30 capsule, Rfl: 0  •  isosorbide mononitrate (IMDUR) 30 MG 24 hr tablet, Take 30 mg by mouth Daily., Disp: , Rfl:   •  Misc Natural Products (BLACK CHERRY CONCENTRATE PO), Take 1 capsule by mouth Daily. For gout (1 capsule = 500 mg), Disp: , Rfl:   •  ranolazine (RANEXA) 500 MG 12 hr tablet, Take 500 mg by mouth 2 (Two) Times a Day., Disp: , Rfl:   •  sucralfate (CARAFATE) 1 G tablet, Take 1 g by mouth 3 (Three) Times a Day. For 14 days., Disp: , Rfl:   •  warfarin (COUMADIN) 2.5 MG tablet, Take one tablet daily except on Monday, take two tablets or as Directed by Coumadin Clinic, Disp: 100 tablet, Rfl: 1    ALLERGIES:  Flomax [tamsulosin hcl] and Motrin [ibuprofen]    REVIEW OF SYSTEMS  Review of Systems   Constitutional: Positive for appetite change and fatigue. Negative for activity change, chills and fever.   HENT: Negative for ear pain and sore throat.    Eyes: Negative for pain and visual disturbance.   Respiratory: Positive for shortness of breath. Negative for cough.    Cardiovascular: Positive for chest pain. Negative for palpitations.   Gastrointestinal: Negative for abdominal pain and nausea.   Endocrine: Negative for cold intolerance and heat intolerance.   Genitourinary: Negative for difficulty urinating and dysuria.   Musculoskeletal: Negative for arthralgias and gait problem.   Skin: Negative for color change and rash.   Neurological: Negative for dizziness, weakness and headaches.   Hematological: Negative for adenopathy. Does not bruise/bleed easily.   Psychiatric/Behavioral: Negative for agitation, confusion and sleep disturbance.       PHYSICAL EXAM:  Temp:  [97.3 °F (36.3 °C)] 97.3 °F (36.3 °C)  Heart Rate:  [] 127  Resp:  [18] 18  BP: (103-124)/(66-78) 124/78  Body mass index is 21.18 kg/(m^2).  Physical Exam   Constitutional: He is oriented to person, place, and time. He appears well-developed and well-nourished. He appears  cachectic. He has a sickly appearance. He appears ill.   HENT:   Head: Normocephalic and atraumatic.   Right Ear: External ear normal.   Left Ear: External ear normal.   Nose: Nose normal.   Mouth/Throat: Oropharynx is clear and moist.   Eyes: Conjunctivae and EOM are normal. Pupils are equal, round, and reactive to light. Right eye exhibits no discharge. Left eye exhibits no discharge. No scleral icterus.   Neck: Normal range of motion. Neck supple. No JVD present. No tracheal deviation present. No thyromegaly present.   Cardiovascular: Normal heart sounds and intact distal pulses.  An irregularly irregular rhythm present. Tachycardia present.  Exam reveals no gallop and no friction rub.    No murmur heard.  Bilateral pitting edema in lower extremities.   Pulmonary/Chest: Effort normal and breath sounds normal. No stridor. No respiratory distress. He has no wheezes. He has no rales. He exhibits no tenderness.   Abdominal: Soft. Bowel sounds are normal. He exhibits no distension and no mass. There is no tenderness. There is no rebound and no guarding. No hernia.   Musculoskeletal: Normal range of motion. He exhibits no edema or deformity.   Lymphadenopathy:     He has no cervical adenopathy.   Neurological: He is alert and oriented to person, place, and time. No sensory deficit. He exhibits normal muscle tone. Coordination normal.   Skin: Skin is warm and dry. No erythema.   Psychiatric: He has a normal mood and affect. His behavior is normal. Judgment and thought content normal.   Nursing note and vitals reviewed.      DIAGNOSTIC DATA:   Lab Results (last 24 hours)     Procedure Component Value Units Date/Time    Magnesium [[id number]]  (Normal) Collected:  06/02/17 1144    Specimen:  Blood Updated:  06/02/17 1248     Magnesium 2.2 mg/dL     Amylase [[id number]]  (Normal) Collected:  06/02/17 1144    Specimen:  Blood Updated:  06/02/17 1248     Amylase 56 U/L     Lipase [[id number]]  (Normal) Collected:  06/02/17 1144     Specimen:  Blood Updated:  06/02/17 1248     Lipase 65 U/L     CK [[id number]]  (Abnormal) Collected:  06/02/17 1144    Specimen:  Blood Updated:  06/02/17 1248     Creatine Kinase 233 (H) U/L     Comprehensive Metabolic Panel [[id number]]  (Abnormal) Collected:  06/02/17 1144    Specimen:  Blood Updated:  06/02/17 1252     Glucose 79 mg/dL      BUN 41 (H) mg/dL      Creatinine 4.44 (H) mg/dL      Sodium 131 (L) mmol/L      Potassium 4.3 mmol/L      Chloride 89 (L) mmol/L      CO2 26.0 mmol/L      Calcium 9.1 mg/dL      Total Protein 6.3 g/dL      Albumin 3.50 g/dL      ALT (SGPT) 118 (H) U/L      AST (SGOT) 179 (H) U/L      Alkaline Phosphatase 93 U/L      Total Bilirubin 4.1 (H) mg/dL      eGFR Non African Amer 13 (L) mL/min/1.73      Globulin 2.8 gm/dL      A/G Ratio 1.3 g/dL      BUN/Creatinine Ratio 9.2     Anion Gap 16.0 (H) mmol/L     Narrative:       The MDRD GFR formula is only valid for adults with stable renal function between ages 18 and 70.    CBC Auto Differential [[id number]]  (Abnormal) Collected:  06/02/17 1144    Specimen:  Blood Updated:  06/02/17 1253     WBC 5.64 10*3/mm3      RBC 4.22 (L) 10*6/mm3      Hemoglobin 13.2 (L) g/dL      Hematocrit 39.6 %      MCV 93.8 fL      MCH 31.3 pg      MCHC 33.3 g/dL      RDW 23.1 (H) %      RDW-SD 77.1 (H) fl      MPV -- fL       INSTRUMENT UNABLE TO CALCULATE RESULT        Platelets 125 (L) 10*3/mm3      Neutrophil % 77.0 %      Lymphocyte % 11.7 %      Monocyte % 9.6 %      Eosinophil % 0.5 %      Basophil % 0.5 %      Immature Grans % 0.7 (H) %      Neutrophils, Absolute 4.34 10*3/mm3      Lymphocytes, Absolute 0.66 10*3/mm3      Monocytes, Absolute 0.54 10*3/mm3      Eosinophils, Absolute 0.03 10*3/mm3      Basophils, Absolute 0.03 10*3/mm3      Immature Grans, Absolute 0.04 (H) 10*3/mm3      nRBC 0.0 /100 WBC     CK-MB [[id number]]  (Normal) Collected:  06/02/17 1144    Specimen:  Blood Updated:  06/02/17 1258     CKMB 3.34 ng/mL     Troponin [[id number]]   (Abnormal) Collected:  06/02/17 1144    Specimen:  Blood Updated:  06/02/17 1301     Troponin I 0.404 (C) ng/mL     TSH [[id number]]  (Abnormal) Collected:  06/02/17 1144    Specimen:  Blood Updated:  06/02/17 1317     TSH 6.320 (H) mIU/mL     Protime-INR [[id number]]  (Abnormal) Collected:  06/02/17 1242    Specimen:  Blood Updated:  06/02/17 1323     Protime >120.0 (H) Seconds      INR >18.00 (C)      Results confirmed by recollection       Narrative:       Therapeutic range for most indications is 2.0-3.0 INR,  or 2.5-3.5 for mechanical heart valves.    Extra Tubes [[id number]] Collected:  06/02/17 1243    Specimen:  Blood from Blood, Venous Line Updated:  06/02/17 1401    Narrative:       The following orders were created for panel order Extra Tubes.  Procedure                               Abnormality         Status                     ---------                               -----------         ------                     Green Top (Gel)[[id number]]                                  Final result                 Please view results for these tests on the individual orders.    Green Top (Gel) [[id number]] Collected:  06/02/17 1243    Specimen:  Blood Updated:  06/02/17 1401     Extra Tube Hold for add-ons.      Auto resulted.       CBC & Differential [[id number]] Collected:  06/02/17 1144    Specimen:  Blood Updated:  06/02/17 4640    Narrative:       The following orders were created for panel order CBC & Differential.  Procedure                               Abnormality         Status                     ---------                               -----------         ------                     Scan Slide[[id number]]                                       Final result               CBC Auto Differential[[id number]]        Abnormal            Final result                 Please view results for these tests on the individual orders.    Scan Slide [[id number]] Collected:  06/02/17 1144    Specimen:  Blood Updated:  06/02/17 1438      Anisocytosis Large/3+     Dacrocytes Mod/2+     Polychromasia Slight/1+     Schistocytes Slight/1+     WBC Morphology Normal     Platelet Estimate Decreased     Clumped Platelets --      NONE SEEN       Protime-INR [[id number]]  (Abnormal) Collected:  06/02/17 1544    Specimen:  Blood Updated:  06/02/17 1705     Protime >120.0 (H) Seconds      INR >13.00 (C)    Narrative:       Therapeutic range for most indications is 2.0-3.0 INR,  or 2.5-3.5 for mechanical heart valves.           Imaging Results (last 24 hours)     Procedure Component Value Units Date/Time    XR Chest 1 View [[id number]] Collected:  06/02/17 1235     Updated:  06/02/17 1252    Narrative:         EXAM:         Radiograph(s), Chest   VIEWS:   Portable ; 1       DATE/TIME: 6/2/2017 12:49 PM CDT               INDICATION:     Weakness      COMPARISON:   CXR: 3/13/17          FINDINGS:           - lines/tubes:     Right approach large caliber central venous  catheter in standard position.     - cardiac:          size within normal limits         - mediastinum:  contour within normal limits         - lungs:          no focal air space process, pulmonary  interstitial edema, nodule(s)/mass             - pleura:          Tiny left pleural fluid collection of doubtful  clinical significance.                  - osseous:          Status post CABG                  - misc.:        Impression:       CONCLUSION:        1. No evidence of an acute cardiopulmonary process.                      Electronically signed by:  SAROJ Ortega MD  6/2/2017 12:52  PM CDT Workstation: [id number]          I reviewed the patient's new clinical results.  I reviewed the patient's new imaging results and agree with the interpretation.  I reviewed the patient's other test results and agree with the interpretation    ASSESSMENT AND PLAN: This is a 83 y.o. male with:    Active Hospital Problems (** Indicates Principal Problem)    Diagnosis Date Noted   • Coumadin toxicity  [T45.511A] 06/02/2017   • Elevated liver enzymes [R74.8] 06/02/2017   • Cholelithiases [K80.20] 06/02/2017   • Chronic atrial fibrillation [I48.2] 06/02/2017      Resolved Hospital Problems    Diagnosis Date Noted Date Resolved   No resolved problems to display.       Vitamin K for hypocoag state secondary to Coumadin toxicity  Monitor coags  Monitor transaminases and bili and consult GI  Rate control for a fib and consult cardio prn.    I have examined the patient and reviewed the pertinent diagnostic data. I have discussed the case with the Family Medicine Resident and agree with the assessment and plan of care.    Home Hernandez DO           This document has been electronically signed by Home Hernandez DO on June 2, 2017 5:40 PM       Electronically signed by Home Hernandez DO at 6/2/2017  5:40 PM        Hospital Medications (active)       Dose Frequency Start End    acetaminophen-codeine (TYLENOL #3) 300-30 MG per tablet 1 tablet 1 tablet Every 6 Hours PRN 6/3/2017     Sig - Route: Take 1 tablet by mouth Every 6 (Six) Hours As Needed for Moderate Pain (4-6). - Oral    Cosign for Ordering: Accepted by Aime Moreira MD on 6/3/2017  4:28 PM    albumin human 25 % IV SOLN 12.5 g 12.5 g Once 6/3/2017 6/3/2017    Sig - Route: Infuse 50 mL into a venous catheter 1 (One) Time. - Intravenous    albumin human 25 % IV SOLN 12.5 g 12.5 g Once 6/4/2017 6/4/2017    Sig - Route: Infuse 50 mL into a venous catheter 1 (One) Time. - Intravenous    albumin human 25 % IV SOLN 25 g 25 g As Needed 6/3/2017 6/4/2017    Sig - Route: Infuse 100 mL into a venous catheter As Needed (Hypotension During Dialysis). - Intravenous    Notes to Pharmacy: Please put access in dialysis ADS. Thank you    azithromycin (ZITHROMAX) tablet 250 mg 250 mg Every 24 Hours 6/4/2017     Sig - Route: Take 1 tablet by mouth Daily. - Oral    azithromycin (ZITHROMAX) tablet 500 mg 500 mg Once 6/3/2017 6/3/2017    Sig - Route: Take 2  tablets by mouth 1 (One) Time. - Oral    carvedilol (COREG) tablet 12.5 mg 12.5 mg 2 Times Daily With Meals 6/3/2017     Sig - Route: Take 1 tablet by mouth 2 (Two) Times a Day With Meals. - Oral    Cosign for Ordering: Accepted by Aime Moreira MD on 6/3/2017  4:28 PM    cefTRIAXone (ROCEPHIN) 1 g/100 mL 0.9% NS (MBP) 1 g Every 24 Hours 6/3/2017     Sig - Route: Infuse 100 mL into a venous catheter Daily. - Intravenous    colchicine tablet 0.6 mg 0.6 mg Daily 6/3/2017     Sig - Route: Take 1 tablet by mouth Daily. - Oral    Cosign for Ordering: Accepted by Aime Moreira MD on 6/3/2017  4:28 PM    diltiaZEM CD (CARDIZEM CD) 24 hr capsule 120 mg 120 mg Daily 6/3/2017     Sig - Route: Take 1 capsule by mouth Daily. - Oral    Cosign for Ordering: Accepted by Aime Moreira MD on 6/3/2017  4:28 PM    heparin (porcine) injection 4,100 Units 4,100 Units Daily PRN 6/3/2017     Sig - Route: 4.1 mL by Intracatheter route Daily As Needed (For Vascath packing after HD tx.). - Intracatheter    isosorbide mononitrate (IMDUR) 24 hr tablet 30 mg 30 mg Daily 6/3/2017     Sig - Route: Take 1 tablet by mouth Daily. - Oral    Cosign for Ordering: Accepted by Aime Moreira MD on 6/3/2017  4:28 PM    magic butt ointment  2 Times Daily 6/3/2017     Sig - Route: Apply  topically 2 (Two) Times a Day. - Topical    Cosign for Ordering: Accepted by Aime Moreira MD on 6/3/2017  4:28 PM    magnesium sulfate in D5W 1g/100mL (PREMIX) 1 g Once 6/3/2017 6/3/2017    Sig - Route: Infuse 100 mL into a venous catheter 1 (One) Time. - Intravenous    Cosign for Ordering: Accepted by Aime Moreira MD on 6/3/2017  9:10 PM    ranolazine (RANEXA) 12 hr tablet 500 mg 500 mg 2 Times Daily 6/3/2017     Sig - Route: Take 1 tablet by mouth 2 (Two) Times a Day. - Oral    Cosign for Ordering: Accepted by Aime Moreira MD on 6/3/2017  4:28 PM    sodium chloride 0.9 % bolus 250 mL 250 mL Once 6/4/2017 6/4/2017     Sig - Route: Infuse 250 mL into a venous catheter 1 (One) Time. - Intravenous    sodium chloride 0.9 % flush 1-10 mL 1-10 mL As Needed 6/2/2017     Sig - Route: Infuse 1-10 mL into a venous catheter As Needed for Line Care. - Intravenous    Cosign for Ordering: Accepted by Aime Moreira MD on 6/3/2017 12:22 AM    albumin human 5 % bottle 12.5 g (Discontinued) 12.5 g Once 6/3/2017 6/3/2017    Sig - Route: Infuse 250 mL into a venous catheter 1 (One) Time. - Intravenous    Reason for Discontinue: Discontinued by another clinician    sodium chloride 0.9 % infusion (Discontinued) 125 mL/hr Continuous 6/2/2017 6/3/2017    Sig - Route: Infuse 125 mL/hr into a venous catheter Continuous. - Intravenous          Lab Results (last 72 hours)     Procedure Component Value Units Date/Time    Magnesium [[id number]]  (Normal) Collected:  06/02/17 1144    Specimen:  Blood Updated:  06/02/17 1248     Magnesium 2.2 mg/dL     Amylase [[id number]]  (Normal) Collected:  06/02/17 1144    Specimen:  Blood Updated:  06/02/17 1248     Amylase 56 U/L     Lipase [[id number]]  (Normal) Collected:  06/02/17 1144    Specimen:  Blood Updated:  06/02/17 1248     Lipase 65 U/L     CK [[id number]]  (Abnormal) Collected:  06/02/17 1144    Specimen:  Blood Updated:  06/02/17 1248     Creatine Kinase 233 (H) U/L     Comprehensive Metabolic Panel [[id number]]  (Abnormal) Collected:  06/02/17 1144    Specimen:  Blood Updated:  06/02/17 1252     Glucose 79 mg/dL      BUN 41 (H) mg/dL      Creatinine 4.44 (H) mg/dL      Sodium 131 (L) mmol/L      Potassium 4.3 mmol/L      Chloride 89 (L) mmol/L      CO2 26.0 mmol/L      Calcium 9.1 mg/dL      Total Protein 6.3 g/dL      Albumin 3.50 g/dL      ALT (SGPT) 118 (H) U/L      AST (SGOT) 179 (H) U/L      Alkaline Phosphatase 93 U/L      Total Bilirubin 4.1 (H) mg/dL      eGFR Non African Amer 13 (L) mL/min/1.73      Globulin 2.8 gm/dL      A/G Ratio 1.3 g/dL      BUN/Creatinine Ratio 9.2     Anion Gap 16.0  (H) mmol/L     Narrative:       The MDRD GFR formula is only valid for adults with stable renal function between ages 18 and 70.    CBC Auto Differential [[id number]]  (Abnormal) Collected:  06/02/17 1144    Specimen:  Blood Updated:  06/02/17 1253     WBC 5.64 10*3/mm3      RBC 4.22 (L) 10*6/mm3      Hemoglobin 13.2 (L) g/dL      Hematocrit 39.6 %      MCV 93.8 fL      MCH 31.3 pg      MCHC 33.3 g/dL      RDW 23.1 (H) %      RDW-SD 77.1 (H) fl      MPV -- fL       INSTRUMENT UNABLE TO CALCULATE RESULT        Platelets 125 (L) 10*3/mm3      Neutrophil % 77.0 %      Lymphocyte % 11.7 %      Monocyte % 9.6 %      Eosinophil % 0.5 %      Basophil % 0.5 %      Immature Grans % 0.7 (H) %      Neutrophils, Absolute 4.34 10*3/mm3      Lymphocytes, Absolute 0.66 10*3/mm3      Monocytes, Absolute 0.54 10*3/mm3      Eosinophils, Absolute 0.03 10*3/mm3      Basophils, Absolute 0.03 10*3/mm3      Immature Grans, Absolute 0.04 (H) 10*3/mm3      nRBC 0.0 /100 WBC     CK-MB [[id number]]  (Normal) Collected:  06/02/17 1144    Specimen:  Blood Updated:  06/02/17 1258     CKMB 3.34 ng/mL     Troponin [[id number]]  (Abnormal) Collected:  06/02/17 1144    Specimen:  Blood Updated:  06/02/17 1301     Troponin I 0.404 (C) ng/mL     TSH [[id number]]  (Abnormal) Collected:  06/02/17 1144    Specimen:  Blood Updated:  06/02/17 1317     TSH 6.320 (H) mIU/mL     Protime-INR [[id number]]  (Abnormal) Collected:  06/02/17 1242    Specimen:  Blood Updated:  06/02/17 1323     Protime >120.0 (H) Seconds      INR >18.00 (C)      Results confirmed by recollection       Narrative:       Therapeutic range for most indications is 2.0-3.0 INR,  or 2.5-3.5 for mechanical heart valves.    Extra Tubes [[id number]] Collected:  06/02/17 1243    Specimen:  Blood from Blood, Venous Line Updated:  06/02/17 1401    Narrative:       The following orders were created for panel order Extra Tubes.  Procedure                               Abnormality         Status                      ---------                               -----------         ------                     Green Top (Gel)[[id number]]                                  Final result                 Please view results for these tests on the individual orders.    Green Top (Gel) [[id number]] Collected:  06/02/17 1243    Specimen:  Blood Updated:  06/02/17 1401     Extra Tube Hold for add-ons.      Auto resulted.       CBC & Differential [[id number]] Collected:  06/02/17 1144    Specimen:  Blood Updated:  06/02/17 1434    Narrative:       The following orders were created for panel order CBC & Differential.  Procedure                               Abnormality         Status                     ---------                               -----------         ------                     Scan Slide[[id number]]                                       Final result               CBC Auto Differential[[id number]]        Abnormal            Final result                 Please view results for these tests on the individual orders.    Scan Slide [[id number]] Collected:  06/02/17 1144    Specimen:  Blood Updated:  06/02/17 1434     Anisocytosis Large/3+     Dacrocytes Mod/2+     Polychromasia Slight/1+     Schistocytes Slight/1+     WBC Morphology Normal     Platelet Estimate Decreased     Clumped Platelets --      NONE SEEN       Protime-INR [[id number]]  (Abnormal) Collected:  06/02/17 1544    Specimen:  Blood Updated:  06/02/17 1705     Protime >120.0 (H) Seconds      INR >13.00 (C)    Narrative:       Therapeutic range for most indications is 2.0-3.0 INR,  or 2.5-3.5 for mechanical heart valves.    Protime-INR [[id number]]  (Abnormal) Collected:  06/02/17 1912    Specimen:  Blood Updated:  06/02/17 1953     Protime 16.5 (H) Seconds      INR 1.33 (H)    Narrative:       Therapeutic range for most indications is 2.0-3.0 INR,  or 2.5-3.5 for mechanical heart valves.    Protime-INR [[id number]]  (Abnormal) Collected:  06/03/17 0022    Specimen:  Blood  Updated:  06/03/17 0100     Protime 17.7 (H) Seconds      INR 1.46 (H)    Narrative:       Therapeutic range for most indications is 2.0-3.0 INR,  or 2.5-3.5 for mechanical heart valves.    Troponin [[id number]]  (Abnormal) Collected:  06/03/17 0022    Specimen:  Blood Updated:  06/03/17 0115     Troponin I 0.291 (C) ng/mL     CEA [[id number]] Collected:  06/03/17 0526    Specimen:  Blood Updated:  06/03/17 0552    CBC Auto Differential [[id number]]  (Abnormal) Collected:  06/03/17 0526    Specimen:  Blood Updated:  06/03/17 0605     WBC 4.94 10*3/mm3      RBC 4.15 (L) 10*6/mm3      Hemoglobin 13.0 (L) g/dL      Hematocrit 39.5 %      MCV 95.2 fL      MCH 31.3 pg      MCHC 32.9 g/dL      RDW 22.6 (H) %      RDW-SD 76.0 (H) fl      MPV -- fL       INSTRUMENT UNABLE TO CALCULATE RESULTS        Platelets 145 (L) 10*3/mm3      Neutrophil % 77.6 %      Lymphocyte % 9.9 (L) %      Monocyte % 10.7 %      Eosinophil % 0.6 %      Basophil % 0.4 %      Immature Grans % 0.8 (H) %      Neutrophils, Absolute 3.83 10*3/mm3      Lymphocytes, Absolute 0.49 (L) 10*3/mm3      Monocytes, Absolute 0.53 10*3/mm3      Eosinophils, Absolute 0.03 10*3/mm3      Basophils, Absolute 0.02 10*3/mm3      Immature Grans, Absolute 0.04 (H) 10*3/mm3     Comprehensive Metabolic Panel [[id number]]  (Abnormal) Collected:  06/03/17 0526    Specimen:  Blood Updated:  06/03/17 0626     Glucose 73 mg/dL      BUN 48 (H) mg/dL      Creatinine 5.21 (H) mg/dL      Sodium 129 (L) mmol/L      Potassium 5.0 mmol/L      Chloride 91 (L) mmol/L      CO2 25.0 mmol/L      Calcium 9.4 mg/dL      Total Protein 6.5 g/dL      Albumin 3.60 g/dL      ALT (SGPT) 125 (H) U/L      AST (SGOT) 204 (H) U/L      Alkaline Phosphatase 104 U/L      Total Bilirubin 5.9 (H) mg/dL      eGFR Non African Amer 11 (L) mL/min/1.73      Globulin 2.9 gm/dL      A/G Ratio 1.2 g/dL      BUN/Creatinine Ratio 9.2     Anion Gap 13.0 mmol/L     Narrative:       The MDRD GFR formula is only valid for  adults with stable renal function between ages 18 and 70.    CBC & Differential [[id number]] Collected:  06/03/17 0526    Specimen:  Blood Updated:  06/03/17 0653    Narrative:       The following orders were created for panel order CBC & Differential.  Procedure                               Abnormality         Status                     ---------                               -----------         ------                     Scan Slide[[id number]]                                                                  CBC Auto Differential[[id number]]        Abnormal            Final result                 Please view results for these tests on the individual orders.    Green Top (Gel) [[id number]] Collected:  06/03/17 0554    Specimen:  Blood Updated:  06/03/17 0701     Extra Tube Hold for add-ons.      Auto resulted.       Extra Tubes [[id number]] Collected:  06/03/17 0554    Specimen:  Blood from Blood, Venous Line Updated:  06/03/17 0701    Narrative:       The following orders were created for panel order Extra Tubes.  Procedure                               Abnormality         Status                     ---------                               -----------         ------                     Gold Top - SST[[id number]]                                   Final result               Green Top (Gel)[[id number]]                                  Final result                 Please view results for these tests on the individual orders.    Gold Top - SST [[id number]] Collected:  06/03/17 0554    Specimen:  Blood Updated:  06/03/17 0701     Extra Tube Hold for add-ons.      Auto resulted.       Troponin [[id number]]  (Abnormal) Collected:  06/03/17 0526    Specimen:  Blood Updated:  06/03/17 0739     Troponin I 0.333 (C) ng/mL     Protime-INR [[id number]]  (Abnormal) Collected:  06/03/17 0526    Specimen:  Blood Updated:  06/03/17 0740     Protime 16.0 (H) Seconds      INR 1.28 (H)    Narrative:       Therapeutic range for most indications  is 2.0-3.0 INR,  or 2.5-3.5 for mechanical heart valves.    Lactic Acid, Plasma [[id number]]  (Abnormal) Collected:  06/03/17 1306    Specimen:  Blood Updated:  06/03/17 1401     Lactate 2.1 (C) mmol/L     Troponin [[id number]]  (Abnormal) Collected:  06/03/17 1306    Specimen:  Blood Updated:  06/03/17 1403     Troponin I 0.382 (C) ng/mL     C-reactive Protein [[id number]]  (Abnormal) Collected:  06/03/17 0554    Specimen:  Blood Updated:  06/03/17 1421     C-Reactive Protein 6.10 (H) mg/dL     Troponin [[id number]]  (Abnormal) Collected:  06/03/17 1632    Specimen:  Blood Updated:  06/03/17 1722     Troponin I 0.411 (C) ng/mL     Lactate Acid, Reflex [[id number]]  (Abnormal) Collected:  06/03/17 1632    Specimen:  Blood Updated:  06/03/17 1908     Lactate 2.8 (C) mmol/L     Blood Culture [[id number]] Collected:  06/03/17 2232    Specimen:  Blood from Arm, Right Updated:  06/03/17 2250    Blood Culture [[id number]] Collected:  06/03/17 2243    Specimen:  Blood from Hand, Right Updated:  06/03/17 2250    Troponin [[id number]]  (Abnormal) Collected:  06/03/17 2357    Specimen:  Blood Updated:  06/04/17 0111     Troponin I 0.334 (C) ng/mL     Cancer Antigen 19-9 [[id number]]  (Abnormal) Collected:  06/03/17 0526    Specimen:  Blood Updated:  06/04/17 0539     CA 19-9 85 (H) U/mL       Roche ECLIA methodology       Narrative:       Performed at:  [address] [place] [address]  [id number]  : Ernie Martinez PhD, Phone:  [phone]    Scan Slide [[id number]] Collected:  06/04/17 0716    Specimen:  Blood Updated:  06/04/17 0831    Comprehensive Metabolic Panel [[id number]]  (Abnormal) Collected:  06/04/17 0716    Specimen:  Blood Updated:  06/04/17 0844     Glucose 81 mg/dL      BUN 44 (H) mg/dL      Creatinine 4.54 (H) mg/dL      Sodium 131 (L) mmol/L      Potassium 4.2 mmol/L      Chloride 90 (L) mmol/L      CO2 22.0 mmol/L      Calcium 8.7 mg/dL      Total Protein 6.0 (L) g/dL      Albumin 3.40  g/dL      ALT (SGPT) 113 (H) U/L      AST (SGOT) 166 (H) U/L      Alkaline Phosphatase 90 U/L      Total Bilirubin 6.3 (H) mg/dL      eGFR Non African Amer 12 (L) mL/min/1.73      Globulin 2.6 gm/dL      A/G Ratio 1.3 g/dL      BUN/Creatinine Ratio 9.7     Anion Gap 19.0 (H) mmol/L     Narrative:       The MDRD GFR formula is only valid for adults with stable renal function between ages 18 and 70.    Protime-INR [[id number]]  (Abnormal) Collected:  06/04/17 0716    Specimen:  Blood Updated:  06/04/17 0848     Protime 16.3 (H) Seconds      INR 1.31 (H)    Narrative:       Therapeutic range for most indications is 2.0-3.0 INR,  or 2.5-3.5 for mechanical heart valves.    Troponin [[id number]]  (Abnormal) Collected:  06/04/17 0716    Specimen:  Blood Updated:  06/04/17 0857     Troponin I 0.328 (C) ng/mL     CBC & Differential [[id number]] Collected:  06/04/17 0716    Specimen:  Blood Updated:  06/04/17 0909    Narrative:       The following orders were created for panel order CBC & Differential.  Procedure                               Abnormality         Status                     ---------                               -----------         ------                     Scan Slide[[id number]]                                       In process                 CBC Auto Differential[[id number]]        Abnormal            Final result                 Please view results for these tests on the individual orders.    CBC Auto Differential [[id number]]  (Abnormal) Collected:  06/04/17 0716    Specimen:  Blood Updated:  06/04/17 0909     WBC 3.33 10*3/mm3      RBC 3.92 (L) 10*6/mm3      Hemoglobin 12.2 (L) g/dL      Hematocrit 37.7 (L) %      MCV 96.2 fL      MCH 31.1 pg      MCHC 32.4 g/dL      RDW 22.6 (H) %      RDW-SD 75.3 (H) fl      MPV -- fL       INSTRUMENT UNABLE TO CALCULATE RESULTS        Platelets 95 (L) 10*3/mm3       SPECIMEN REANALYZED TO CONFIRM RESULTS        Neutrophil % 79.0 %      Lymphocyte % 9.3 (L) %      Monocyte %  9.6 %      Eosinophil % 0.0 %      Basophil % 0.9 %      Immature Grans % 1.2 (H) %      Neutrophils, Absolute 2.63 10*3/mm3      Lymphocytes, Absolute 0.31 (L) 10*3/mm3      Monocytes, Absolute 0.32 10*3/mm3      Eosinophils, Absolute 0.00 10*3/mm3      Basophils, Absolute 0.03 10*3/mm3      Immature Grans, Absolute 0.04 (H) 10*3/mm3              Physician Progress Notes (most recent note)      Home Hernandez DO at 6/3/2017  1:15 PM  Version 1 of 1              LOS: 1 day   Patient Care Team:  Dago Lopez MD as PCP - General    Chief Complaint: Coumadin toxicity    Subjective     Interval History: Fatigue and weakness    Patient Complaints: fatigue  Patient Denies:  fever  History taken from: patient chart family RN    Review of Systems:    Review of Systems   Constitutional: Positive for activity change, appetite change and fatigue. Negative for fever.   HENT: Negative for ear pain and sore throat.    Eyes: Negative for pain and visual disturbance.   Respiratory: Negative for cough and shortness of breath.    Cardiovascular: Negative for chest pain and palpitations.   Gastrointestinal: Negative for abdominal pain and nausea.   Endocrine: Negative for cold intolerance and heat intolerance.   Genitourinary: Negative for difficulty urinating and dysuria.   Musculoskeletal: Negative for arthralgias and gait problem.   Skin: Negative for color change and rash.   Neurological: Negative for dizziness, weakness and headaches.   Hematological: Negative for adenopathy. Does not bruise/bleed easily.   Psychiatric/Behavioral: Negative for agitation, confusion and sleep disturbance.       Objective     Vital Signs  Temp:  [96.1 °F (35.6 °C)-97.4 °F (36.3 °C)] 96.1 °F (35.6 °C)  Heart Rate:  [118-133] 118  Resp:  [18-20] 18  BP: (112-126)/(72-82) 126/77    Physical Exam:   Physical Exam   Constitutional: He is oriented to person, place, and time. He appears well-developed and well-nourished. He appears lethargic.   Non-toxic appearance. He has a sickly appearance. He appears ill. No distress.   HENT:   Head: Normocephalic and atraumatic.   Right Ear: External ear normal.   Left Ear: External ear normal.   Nose: Nose normal.   Mouth/Throat: Oropharynx is clear and moist.   Eyes: Conjunctivae and EOM are normal. Pupils are equal, round, and reactive to light. Right eye exhibits no discharge. Left eye exhibits no discharge. No scleral icterus.   Neck: Normal range of motion. Neck supple. No JVD present. No tracheal deviation present. No thyromegaly present.   Cardiovascular: Normal heart sounds and intact distal pulses.  An irregularly irregular rhythm present. Tachycardia present.  Exam reveals no gallop and no friction rub.    No murmur heard.  Pulmonary/Chest: Effort normal and breath sounds normal. No stridor. No respiratory distress. He has no wheezes. He has no rales. He exhibits no tenderness.   Abdominal: Soft. Bowel sounds are normal. He exhibits no distension and no mass. There is no tenderness. There is no rebound and no guarding. No hernia.   Musculoskeletal: Normal range of motion. He exhibits no edema or deformity.   Lymphadenopathy:     He has no cervical adenopathy.   Neurological: He is oriented to person, place, and time. He appears lethargic. He exhibits normal muscle tone. Coordination normal.   Skin: Skin is warm and dry. No erythema.   Psychiatric: He has a normal mood and affect. His behavior is normal. Judgment and thought content normal.   Nursing note and vitals reviewed.       Results Review:       Lab Results (last 24 hours)     Procedure Component Value Units Date/Time    TSH [[id number]]  (Abnormal) Collected:  06/02/17 1144    Specimen:  Blood Updated:  06/02/17 1317     TSH 6.320 (H) mIU/mL     Protime-INR [[id number]]  (Abnormal) Collected:  06/02/17 1242    Specimen:  Blood Updated:  06/02/17 1323     Protime >120.0 (H) Seconds      INR >18.00 (C)      Results confirmed by recollection        Narrative:       Therapeutic range for most indications is 2.0-3.0 INR,  or 2.5-3.5 for mechanical heart valves.    Extra Tubes [[id number]] Collected:  06/02/17 1243    Specimen:  Blood from Blood, Venous Line Updated:  06/02/17 1401    Narrative:       The following orders were created for panel order Extra Tubes.  Procedure                               Abnormality         Status                     ---------                               -----------         ------                     Green Top (Gel)[[id number]]                                  Final result                 Please view results for these tests on the individual orders.    Green Top (Gel) [[id number]] Collected:  06/02/17 1243    Specimen:  Blood Updated:  06/02/17 1401     Extra Tube Hold for add-ons.      Auto resulted.       CBC & Differential [[id number]] Collected:  06/02/17 1144    Specimen:  Blood Updated:  06/02/17 1434    Narrative:       The following orders were created for panel order CBC & Differential.  Procedure                               Abnormality         Status                     ---------                               -----------         ------                     Scan Slide[[id number]]                                       Final result               CBC Auto Differential[[id number]]        Abnormal            Final result                 Please view results for these tests on the individual orders.    Scan Slide [[id number]] Collected:  06/02/17 1144    Specimen:  Blood Updated:  06/02/17 1434     Anisocytosis Large/3+     Dacrocytes Mod/2+     Polychromasia Slight/1+     Schistocytes Slight/1+     WBC Morphology Normal     Platelet Estimate Decreased     Clumped Platelets --      NONE SEEN       Protime-INR [[id number]]  (Abnormal) Collected:  06/02/17 1544    Specimen:  Blood Updated:  06/02/17 1705     Protime >120.0 (H) Seconds      INR >13.00 (C)    Narrative:       Therapeutic range for most indications is 2.0-3.0 INR,  or  2.5-3.5 for mechanical heart valves.    Protime-INR [[id number]]  (Abnormal) Collected:  06/02/17 1912    Specimen:  Blood Updated:  06/02/17 1953     Protime 16.5 (H) Seconds      INR 1.33 (H)    Narrative:       Therapeutic range for most indications is 2.0-3.0 INR,  or 2.5-3.5 for mechanical heart valves.    Protime-INR [[id number]]  (Abnormal) Collected:  06/03/17 0022    Specimen:  Blood Updated:  06/03/17 0100     Protime 17.7 (H) Seconds      INR 1.46 (H)    Narrative:       Therapeutic range for most indications is 2.0-3.0 INR,  or 2.5-3.5 for mechanical heart valves.    Troponin [[id number]]  (Abnormal) Collected:  06/03/17 0022    Specimen:  Blood Updated:  06/03/17 0115     Troponin I 0.291 (C) ng/mL     Cancer Antigen 19-9 [[id number]] Collected:  06/03/17 0526    Specimen:  Blood Updated:  06/03/17 0551    CEA [[id number]] Collected:  06/03/17 0526    Specimen:  Blood Updated:  06/03/17 0552    CBC Auto Differential [[id number]]  (Abnormal) Collected:  06/03/17 0526    Specimen:  Blood Updated:  06/03/17 0605     WBC 4.94 10*3/mm3      RBC 4.15 (L) 10*6/mm3      Hemoglobin 13.0 (L) g/dL      Hematocrit 39.5 %      MCV 95.2 fL      MCH 31.3 pg      MCHC 32.9 g/dL      RDW 22.6 (H) %      RDW-SD 76.0 (H) fl      MPV -- fL       INSTRUMENT UNABLE TO CALCULATE RESULTS        Platelets 145 (L) 10*3/mm3      Neutrophil % 77.6 %      Lymphocyte % 9.9 (L) %      Monocyte % 10.7 %      Eosinophil % 0.6 %      Basophil % 0.4 %      Immature Grans % 0.8 (H) %      Neutrophils, Absolute 3.83 10*3/mm3      Lymphocytes, Absolute 0.49 (L) 10*3/mm3      Monocytes, Absolute 0.53 10*3/mm3      Eosinophils, Absolute 0.03 10*3/mm3      Basophils, Absolute 0.02 10*3/mm3      Immature Grans, Absolute 0.04 (H) 10*3/mm3     Comprehensive Metabolic Panel [[id number]]  (Abnormal) Collected:  06/03/17 0526    Specimen:  Blood Updated:  06/03/17 0626     Glucose 73 mg/dL      BUN 48 (H) mg/dL      Creatinine 5.21 (H) mg/dL      Sodium  129 (L) mmol/L      Potassium 5.0 mmol/L      Chloride 91 (L) mmol/L      CO2 25.0 mmol/L      Calcium 9.4 mg/dL      Total Protein 6.5 g/dL      Albumin 3.60 g/dL      ALT (SGPT) 125 (H) U/L      AST (SGOT) 204 (H) U/L      Alkaline Phosphatase 104 U/L      Total Bilirubin 5.9 (H) mg/dL      eGFR Non African Amer 11 (L) mL/min/1.73      Globulin 2.9 gm/dL      A/G Ratio 1.2 g/dL      BUN/Creatinine Ratio 9.2     Anion Gap 13.0 mmol/L     Narrative:       The MDRD GFR formula is only valid for adults with stable renal function between ages 18 and 70.    CBC & Differential [[id number]] Collected:  06/03/17 0526    Specimen:  Blood Updated:  06/03/17 0653    Narrative:       The following orders were created for panel order CBC & Differential.  Procedure                               Abnormality         Status                     ---------                               -----------         ------                     Scan Slide[[id number]]                                                                  CBC Auto Differential[[id number]]        Abnormal            Final result                 Please view results for these tests on the individual orders.    Green Top (Gel) [[id number]] Collected:  06/03/17 0554    Specimen:  Blood Updated:  06/03/17 0701     Extra Tube Hold for add-ons.      Auto resulted.       Extra Tubes [[id number]] Collected:  06/03/17 0554    Specimen:  Blood from Blood, Venous Line Updated:  06/03/17 0701    Narrative:       The following orders were created for panel order Extra Tubes.  Procedure                               Abnormality         Status                     ---------                               -----------         ------                     Gold Top - SST[[id number]]                                   Final result               Green Top (Gel)[[id number]]                                  Final result                 Please view results for these tests on the individual orders.    Gold Top  - SST [[id number]] Collected:  06/03/17 0554    Specimen:  Blood Updated:  06/03/17 0701     Extra Tube Hold for add-ons.      Auto resulted.       Troponin [[id number]]  (Abnormal) Collected:  06/03/17 0526    Specimen:  Blood Updated:  06/03/17 0739     Troponin I 0.333 (C) ng/mL     Protime-INR [[id number]]  (Abnormal) Collected:  06/03/17 0526    Specimen:  Blood Updated:  06/03/17 0740     Protime 16.0 (H) Seconds      INR 1.28 (H)    Narrative:       Therapeutic range for most indications is 2.0-3.0 INR,  or 2.5-3.5 for mechanical heart valves.        EXAMINATION: Computed Tomography   REGION: Abdomen / Pelvis   INDICATION: Nausea/vomiting  HISTORY:  LOAN. IMAGING: CT A/P 12/22/09 (report)   TECHNIQUE:  - reconstructions: axial, coronal, sagittal  - contrast: oral: Yes ; intravenous: no  - Please note:  - Lack of IV contrast limits assessment of solid organ  parenchyma, urinary system, or vascular structures.  This exam was performed according to the departmental  dose-optimization program which includes automated exposure  control, adjustment of the mA and/or kV according to patient size  and/or use of iterative reconstruction technique.       COMMENTS:     THORAX (INFERIOR):  The lung bases are clear.   There are moderate-sized bilateral pleural fluid collections.  The heart size is normal size and there is no pericardial fluid.     ABDOMEN:  Limited assessment of the solid organ parenchyma is grossly  unremarkable demonstrating no evidence of organomegaly.   Cholelithiasis without gross evidence of wall thickening.  Limited assessment of the viscera is grossly unremarkable  demonstrating normal caliber bowel loops.   No evidence of free fluid or free intraperitoneal air.   The osseous structures are grossly unremarkable for age.     RETROPERITONEUM:  Unenhanced assessment of the kidneys:  Right: displays an attenuated right kidney with a 1.1 cm calculus  in the central collecting system possibly representing  an  early/small staghorn calculus. A 1.8 cm focal hypoattenuating  defect is noted in the interpolar region.  Left: Native renal tissue is not visualized. There is a 12.6 cm  hypoattenuating lesion in the region of the left renal bed,  presumably of renal etiology. Additional septated hypoattenuating  lesions are noted more superiorly..     Limited assessment of the ureters demonstrates normal caliber and  course.   The adrenal glands are of normal size and contour.   No gross evidence of significant retroperitoneal adenopathy.  The vascular structures are grossly within normal limits for age.     PELVIS:  Limited assessment of the viscera is grossly unremarkable  demonstrating normal caliber bowel loops.   No evidence of free fluid or free intraperitoneal air.   The osseous structures are grossly unremarkable for age.   The vascular structures are grossly within normal limits for age.  Markedly enlarged prostate.      .   IMPRESSION:  CONCLUSION:   1. Moderate-sized bilateral pleural fluid collections with  associated extrinsic compressive atelectasis.  2. Diminished right renal size, and native left renal parenchyma  is not visualized. Multiple left-sided hypoattenuating lesions as  described above, presumably of left renal origin.  3. Cholelithiasis.            Electronically signed by: SAROJ Ortega MD 5/16/2017 2:40  PM CDT Workstation: [id number]    Medication Review:   Current Facility-Administered Medications   Medication Dose Route Frequency Provider Last Rate Last Dose   • acetaminophen-codeine (TYLENOL #3) 300-30 MG per tablet 1 tablet  1 tablet Oral Q6H PRN Paulina Samuel MD   1 tablet at 06/03/17 1252   • albumin human 25 % IV SOLN 25 g  25 g Intravenous PRN Ezio Sweeney MD       • carvedilol (COREG) tablet 12.5 mg  12.5 mg Oral BID With Meals Paulina Samuel MD   12.5 mg at 06/03/17 1251   • colchicine tablet 0.6 mg  0.6 mg Oral Daily Paulina Samuel MD   0.6 mg at  06/03/17 1252   • diltiaZEM CD (CARDIZEM CD) 24 hr capsule 120 mg  120 mg Oral Daily Paulina Samuel MD   120 mg at 06/03/17 1251   • heparin (porcine) injection 4,100 Units  4,100 Units Intracatheter Daily PRN Ezio MD Zahra   4,100 Units at 06/03/17 1040   • isosorbide mononitrate (IMDUR) 24 hr tablet 30 mg  30 mg Oral Daily Paulina Samuel MD   30 mg at 06/03/17 1251   • ranolazine (RANEXA) 12 hr tablet 500 mg  500 mg Oral BID Paulina Samuel MD   500 mg at 06/03/17 1251   • sodium chloride 0.9 % flush 1-10 mL  1-10 mL Intravenous PRN Paulina Samuel MD           Assessment/Plan     Principal Problem:    Coumadin toxicity  Active Problems:    PAF (paroxysmal atrial fibrillation)    Elevated liver enzymes    Cholelithiases    Chronic atrial fibrillation    ESRD (end stage renal disease) on dialysis    Plan    Coumadin toxicity resolved  Eval of elevated transaminases and hyperbilirubinemia underway  Consult GI    I have examined the patient and reviewed the pertinent diagnostic data. I have discussed the case with the Family Medicine Resident and agree with the assessment and plan of care.    Home Hernandez DO             This document has been electronically signed by Home Hernandez DO on Nena 3, 2017 1:20 PM         Electronically signed by Home Hernandez DO at 6/3/2017  1:20 PM

## 2017-06-04 NOTE — PROGRESS NOTES
Alvaro Hoang DO,[facility]  Gastroenterology  Hepatology  Endoscopy  Board Certified in Internal Medicine and gastroenterology  [address], [address]  [place]. [address]  T- [phone]   F - [phone]     GASTROENTEROLOGY PROGRESS NOTE   ALVARO HOANG DO.         SUBJECTIVE:   6/4/2017  Chief Complaint:     Subjective  : Continued decline in his health with reduction in his stamina.  Pain in left shoulder.  Anorexia, with progressive jaundice.    Patient is 83 y.o. male presents with generalized fatigue and malaise and anorexia.  The patient was noted to have jaundice with over anticoagulation.    I reviewed today with Dr. Samuel that this appears to be a progressive intrahepatic process.  The patient has no evidence of any common bile duct stone that is present.  The patient has no evidence of any pancreatic mass.  The patient has a elevated CA 19-9 that is minimally elevated at 85.  There is no evidence of any metastatic disease  within the patient's liver.    I reviewed with the patient as well as the wife.  The patient's situation is indicative of multiorgan failure.     CURRENT MEDICATIONS/OBJECTIVE/VS/PE:     Current Medications:     Current Facility-Administered Medications   Medication Dose Route Frequency Provider Last Rate Last Dose   • acetaminophen-codeine (TYLENOL #3) 300-30 MG per tablet 1 tablet  1 tablet Oral Q6H PRN Paulina Samuel MD   1 tablet at 06/03/17 2142   • albumin human 25 % IV SOLN 25 g  25 g Intravenous PRN Ezio Sweeney MD       • azithromycin (ZITHROMAX) tablet 250 mg  250 mg Oral Q24H Aime Moreira MD       • carvedilol (COREG) tablet 12.5 mg  12.5 mg Oral BID With Meals Paulina Samuel MD   12.5 mg at 06/03/17 1811   • cefTRIAXone (ROCEPHIN) 1 g/100 mL 0.9% NS (MBP)  1 g Intravenous Q24H Aime Moreira MD   1 g at 06/03/17 2133   • colchicine tablet 0.6 mg  0.6 mg Oral Daily Paulina Samuel MD   0.6 mg at  06/04/17 1002   • diltiaZEM CD (CARDIZEM CD) 24 hr capsule 120 mg  120 mg Oral Daily Paulina Samuel MD   120 mg at 06/04/17 1002   • heparin (porcine) injection 4,100 Units  4,100 Units Intracatheter Daily PRN Ezio MD Zahra   4,100 Units at 06/03/17 1040   • isosorbide mononitrate (IMDUR) 24 hr tablet 30 mg  30 mg Oral Daily Paulina Samuel MD   30 mg at 06/03/17 1251   • magic butt ointment   Topical BID Paulina Samuel MD       • ranolazine (RANEXA) 12 hr tablet 500 mg  500 mg Oral BID Paulina Samuel MD   500 mg at 06/04/17 1002   • sodium chloride 0.9 % flush 1-10 mL  1-10 mL Intravenous PRN Paulina Samuel MD           Objective     Physical Exam:   Temp:  [96.1 °F (35.6 °C)-96.8 °F (36 °C)] 96.3 °F (35.7 °C)  Heart Rate:  [113-160] 122  Resp:  [16-18] 16  BP: ()/(55-85) 116/62     Physical Exam:  General Appearance:    Alert, cooperative, in no acute distress-Protein calorie malnutrition    Head:    Normocephalic, without obvious abnormality, atraumatic   Eyes:            Lids and lashes normal, conjunctivae and sclerae normal,   icterus, no pallor, corneas clear, PERRLA   Ears:    Ears appear intact with no abnormalities noted   Throat:   No oral lesions, no thrush, oral mucosa moist   Neck:   No adenopathy, supple, trachea midline, no thyromegaly, no     carotid bruit, no JVD   Back:     No kyphosis present, no scoliosis present, no skin lesions,       erythema or scars, no tenderness to percussion or                   palpation,   range of motion normal   Lungs:   Rhonchi    Heart:    Regular rhythm and normal rate, normal S1 and S2, no            murmur, no gallop, no rub, no click   Breast Exam:    Deferred   Abdomen:     Normal bowel sounds, no masses, no organomegaly, soft        non-tender, non-distended, no guarding, no rebound                 tenderness   Genitalia:    Deferred   Extremities:   Moves all extremities well, 1+ edema, no cyanosis,  no              redness   Pulses:   Pulses palpable and equal bilaterally   Skin:   No bleeding, bruising or rash   Lymph nodes:   No palpable adenopathy   Neurologic:   Cranial nerves 2 - 12 grossly intact, sensation intact, DTR        present and equal bilaterally      Results Review:     Lab Results (last 24 hours)     Procedure Component Value Units Date/Time    Lactic Acid, Plasma [[id number]]  (Abnormal) Collected:  06/03/17 1306    Specimen:  Blood Updated:  06/03/17 1401     Lactate 2.1 (C) mmol/L     Troponin [[id number]]  (Abnormal) Collected:  06/03/17 1306    Specimen:  Blood Updated:  06/03/17 1403     Troponin I 0.382 (C) ng/mL     C-reactive Protein [[id number]]  (Abnormal) Collected:  06/03/17 0554    Specimen:  Blood Updated:  06/03/17 1421     C-Reactive Protein 6.10 (H) mg/dL     Troponin [[id number]]  (Abnormal) Collected:  06/03/17 1632    Specimen:  Blood Updated:  06/03/17 1722     Troponin I 0.411 (C) ng/mL     Lactate Acid, Reflex [[id number]]  (Abnormal) Collected:  06/03/17 1632    Specimen:  Blood Updated:  06/03/17 1908     Lactate 2.8 (C) mmol/L     Blood Culture [[id number]] Collected:  06/03/17 2232    Specimen:  Blood from Arm, Right Updated:  06/03/17 2250    Blood Culture [[id number]] Collected:  06/03/17 2243    Specimen:  Blood from Hand, Right Updated:  06/03/17 2250    Troponin [[id number]]  (Abnormal) Collected:  06/03/17 2357    Specimen:  Blood Updated:  06/04/17 0111     Troponin I 0.334 (C) ng/mL     Cancer Antigen 19-9 [[id number]]  (Abnormal) Collected:  06/03/17 0526    Specimen:  Blood Updated:  06/04/17 0539     CA 19-9 85 (H) U/mL       Roche ECLIA methodology       Narrative:       Performed at:  [address] [place] [address]  [id number]  : Ernie Martinez PhD, Phone:  [phone]    Scan Slide [[id number]] Collected:  06/04/17 0716    Specimen:  Blood Updated:  06/04/17 0831    Comprehensive Metabolic Panel [[id number]]  (Abnormal) Collected:   06/04/17 0716    Specimen:  Blood Updated:  06/04/17 0844     Glucose 81 mg/dL      BUN 44 (H) mg/dL      Creatinine 4.54 (H) mg/dL      Sodium 131 (L) mmol/L      Potassium 4.2 mmol/L      Chloride 90 (L) mmol/L      CO2 22.0 mmol/L      Calcium 8.7 mg/dL      Total Protein 6.0 (L) g/dL      Albumin 3.40 g/dL      ALT (SGPT) 113 (H) U/L      AST (SGOT) 166 (H) U/L      Alkaline Phosphatase 90 U/L      Total Bilirubin 6.3 (H) mg/dL      eGFR Non African Amer 12 (L) mL/min/1.73      Globulin 2.6 gm/dL      A/G Ratio 1.3 g/dL      BUN/Creatinine Ratio 9.7     Anion Gap 19.0 (H) mmol/L     Narrative:       The MDRD GFR formula is only valid for adults with stable renal function between ages 18 and 70.    Protime-INR [[id number]]  (Abnormal) Collected:  06/04/17 0716    Specimen:  Blood Updated:  06/04/17 0848     Protime 16.3 (H) Seconds      INR 1.31 (H)    Narrative:       Therapeutic range for most indications is 2.0-3.0 INR,  or 2.5-3.5 for mechanical heart valves.    Troponin [[id number]]  (Abnormal) Collected:  06/04/17 0716    Specimen:  Blood Updated:  06/04/17 0857     Troponin I 0.328 (C) ng/mL     CBC & Differential [[id number]] Collected:  06/04/17 0716    Specimen:  Blood Updated:  06/04/17 0909    Narrative:       The following orders were created for panel order CBC & Differential.  Procedure                               Abnormality         Status                     ---------                               -----------         ------                     Scan Slide[[id number]]                                       In process                 CBC Auto Differential[[id number]]        Abnormal            Final result                 Please view results for these tests on the individual orders.    CBC Auto Differential [[id number]]  (Abnormal) Collected:  06/04/17 0716    Specimen:  Blood Updated:  06/04/17 0909     WBC 3.33 10*3/mm3      RBC 3.92 (L) 10*6/mm3      Hemoglobin 12.2 (L) g/dL      Hematocrit 37.7 (L) %       MCV 96.2 fL      MCH 31.1 pg      MCHC 32.4 g/dL      RDW 22.6 (H) %      RDW-SD 75.3 (H) fl      MPV -- fL       INSTRUMENT UNABLE TO CALCULATE RESULTS        Platelets 95 (L) 10*3/mm3       SPECIMEN REANALYZED TO CONFIRM RESULTS        Neutrophil % 79.0 %      Lymphocyte % 9.3 (L) %      Monocyte % 9.6 %      Eosinophil % 0.0 %      Basophil % 0.9 %      Immature Grans % 1.2 (H) %      Neutrophils, Absolute 2.63 10*3/mm3      Lymphocytes, Absolute 0.31 (L) 10*3/mm3      Monocytes, Absolute 0.32 10*3/mm3      Eosinophils, Absolute 0.00 10*3/mm3      Basophils, Absolute 0.03 10*3/mm3      Immature Grans, Absolute 0.04 (H) 10*3/mm3            I reviewed the patient's new clinical results.  I reviewed the patient's new imaging results and agree with the interpretation.     ASSESSMENT/PLAN:   ASSESSMENT:   1. Gallstones with no evidence of cholecystitis  2. Jaundice.  Secondary to intrahepatic cholestasis due to multiorgan failure   3. Protein calorie malnutrition    PLAN:   1.  ERCP is not indicated at this time and would not be of any benefit to the patient  2.  I reviewed with the patient and the wife regarding this.  They understand that there is nothing that can be done endoscopically or surgically for this particular problem that the patient has  3.  I would treat the patient for the pulmonary process.  I would suspect that this patient will continue to decline.     Alvaro Hoang DO  06/04/17  10:23 AM

## 2017-06-05 NOTE — DISCHARGE SUMMARY
DISCHARGE SUMMARY    NAME: Roni Middleton   PHYSICIAN: Paulina Samuel MD  : 1933  MRN: [id number]    ADMITTED: 2017   DISCHARGED: 17    ADMISSION DIAGNOSES:  Active Hospital Problems (** Indicates Principal Problem)    Diagnosis Date Noted   • **Pneumonia due to infectious organism [J18.9] 2017   • Elevated liver enzymes [R74.8] 2017   • Cholelithiases [K80.20] 2017   • Chronic atrial fibrillation [I48.2] 2017   • ESRD (end stage renal disease) on dialysis [N18.6, Z99.2] 2017   • PAF (paroxysmal atrial fibrillation) [I48.0] 2017      Resolved Hospital Problems    Diagnosis Date Noted Date Resolved   • Coumadin toxicity [T45.511A] 2017     DISCHARGE DIAGNOSES:   Active Hospital Problems (** Indicates Principal Problem)    Diagnosis Date Noted   • **Pneumonia due to infectious organism [J18.9] 2017   • Elevated liver enzymes [R74.8] 2017   • Cholelithiases [K80.20] 2017   • Chronic atrial fibrillation [I48.2] 2017   • ESRD (end stage renal disease) on dialysis [N18.6, Z99.2] 2017   • PAF (paroxysmal atrial fibrillation) [I48.0] 2017      Resolved Hospital Problems    Diagnosis Date Noted Date Resolved   • Coumadin toxicity [T45.511A] 2017       SERVICE: Family Medicine. Attending Dr. Hernandez, Resident Paulina Samuel MD    CONSULTS:   Consult Orders (all)     Start     Ordered    17 0937  Inpatient Consult to Case Management   Once     Provider:  (Not yet assigned)    17 0936    17 2208  Inpatient Consult to Nutrition  Once     Provider:  (Not yet assigned)    17 190  Inpatient Consult to Cardiology  Once     Specialty:  Cardiology  Provider:  Geronimo Jenkins MD    17 19017 190  Inpatient Consult to Gastroenterology  Once     Specialty:  Gastroenterology  Provider:  Alvaro Hoang DO    17  1908 06/02/17 1909  Inpatient Consult to Nephrology  Once     Specialty:  Nephrology  Provider:  Ezio Sweeney MD    06/02/17 1908 06/02/17 1510  Family Practice - Resident (on-call MD unless specified)  Once     Specialty:  Family Medicine  Provider:  Aime Moreira MD    06/02/17 1510          PROCEDURES:   Ct Abdomen Pelvis Without Contrast    Result Date: 5/16/2017  EXAMINATION:  Computed Tomography         REGION:    Abdomen / Pelvis                 INDICATION:  Nausea/vomiting HISTORY: LOAN. IMAGING:    CT A/P 12/22/09 (report)        TECHNIQUE:     - reconstructions:    axial, coronal, sagittal     - contrast:      oral:  Yes ;   intravenous:  no    - Please note:       - Lack of IV contrast limits assessment of solid organ parenchyma, urinary system, or vascular structures. This exam was performed according to the departmental dose-optimization program which includes automated exposure control, adjustment of the mA and/or kV according to patient size and/or use of iterative reconstruction technique.     COMMENTS: THORAX (INFERIOR): The lung bases are clear.   There are moderate-sized bilateral pleural fluid collections. The heart size is normal size and there is no pericardial fluid. ABDOMEN: Limited assessment of the solid organ parenchyma is grossly unremarkable demonstrating no evidence of organomegaly.  Cholelithiasis without gross evidence of wall thickening. Limited assessment of the viscera is grossly unremarkable demonstrating normal caliber bowel loops.  No evidence of free fluid or free intraperitoneal air.   The osseous structures are grossly unremarkable for age. RETROPERITONEUM: Unenhanced assessment of the kidneys: Right: displays an attenuated right kidney with a 1.1 cm calculus in the central collecting system possibly representing an early/small staghorn calculus. A 1.8 cm focal hypoattenuating defect is noted in the interpolar region. Left: Native renal tissue is not  visualized. There is a 12.6 cm hypoattenuating lesion in the region of the left renal bed, presumably of renal etiology. Additional septated hypoattenuating lesions are noted more superiorly.. Limited assessment of the ureters demonstrates normal caliber and course.  The adrenal glands are of normal size and contour.   No gross evidence of significant retroperitoneal adenopathy. The vascular structures are grossly within normal limits for age. PELVIS: Limited assessment of the viscera is grossly unremarkable demonstrating normal caliber bowel loops.   No evidence of free fluid or free intraperitoneal air.   The osseous structures are grossly unremarkable for age.   The vascular structures are grossly within normal limits for age. Markedly enlarged prostate.       .          CONCLUSION:   1. Moderate-sized bilateral pleural fluid collections with associated extrinsic compressive atelectasis. 2. Diminished right renal size, and native left renal parenchyma is not visualized. Multiple left-sided hypoattenuating lesions as described above, presumably of left renal origin. 3. Cholelithiasis.                       Electronically signed by:  SAROJ Ortega MD  5/16/2017 2:40 PM CDT Workstation: [employer]    Xr Chest 1 View    Result Date: 6/3/2017  PROCEDURE: XR CHEST 1 VIEW INDICATION FOR PROCEDURE:  83 years -old patient presents for evaluation of shortness of breath. COMPARISON:  June 2, 2017. FINDINGS: XR CHEST 1 view  reveals the lungs are expanded. Right-sided dialysis catheter is visible within the subclavian vein and the tips of the catheters are in the right atrium. Cardiac monitoring leads are present. Patient has had a sternotomy. There is no radiographic evidence for pneumothorax. The cardiac silhouette is mildly enlarged. Mediastinal silhouette is within normal limits. There are vascular calcifications of the thoracic aorta. There is bilateral basilar airspace consolidation and atelectasis. There are  pleural effusions. The skeletal structures are within normal limits.     1.  Cardiomegaly with mild pulmonary congestion. 2.  Bilateral pleural effusions and possible bilateral basilar airspace disease and/or atelectasis. Electronically signed by:  Tori Arteaga MD  6/3/2017 7:09 PM CDT Workstation: [email or web address]    Xr Chest 1 View    Result Date: 6/2/2017  EXAM:         Radiograph(s), Chest VIEWS:   Portable ; 1     DATE/TIME: 6/2/2017 12:49 PM CDT           INDICATION:     Weakness    COMPARISON:   CXR: 3/13/17      FINDINGS:       - lines/tubes:     Right approach large caliber central venous catheter in standard position.   - cardiac:          size within normal limits       - mediastinum:  contour within normal limits       - lungs:          no focal air space process, pulmonary interstitial edema, nodule(s)/mass           - pleura:          Tiny left pleural fluid collection of doubtful clinical significance.                - osseous:          Status post CABG                - misc.:       CONCLUSION:    1. No evidence of an acute cardiopulmonary process.            Electronically signed by:  SAROJ Ortega MD  6/2/2017 12:52 PM CDT Workstation: [email or web address]    Mri Abdomen Wo Contrast Mrcp    Result Date: 6/4/2017  Patient Name:  MR. SARITA SMITH Patient ID:  [id number] Ordering:  DANIS ARRIETA Attending:  DANIS ARRIETA Referring:  DANIS ARRIETA ------------------------------------------------ MRI of the abdomen without contrast and including MRCP HISTORY: Transaminitis. Cholelithiasis. Poisoning by anticoagulants. Multisequence multiplanar images of the abdomen were obtained without contrast. MRCP performed including 3-D reconstructions. COMPARISON: None. Correlation with CT May 16, 2017 and ultrasound Nena 3, 2017. Minimal cardiomegaly. Moderate-sized right pleural effusion with associated compressive atelectasis right lower lobe. Small left pleural effusion. Cholelithiasis. No  choledocholithiasis. No intrahepatic or extrahepatic biliary dilatation. No stricture of the common bile duct. No pancreatic mass. Unremarkable liver and spleen. No adrenal mass. 1 cm calculus right renal pelvis. Minimal right hydronephrosis with peripelvic stranding. Small right renal cysts. Left kidney entirely replaced with cysts, largest 10.5 cm. No abdominal aortic aneurysm. No adenopathy. Very minimal free fluid anterior to the liver. Dependent subcutaneous edema. No bowel obstruction.     CONCLUSION: Minimal cardiomegaly. Moderate-sized right pleural effusion with associated compressive atelectasis right lower lobe. Small left pleural effusion. Cholelithiasis. No choledocholithiasis. No intrahepatic or extrahepatic biliary dilatation. No stricture of the common bile duct. 1 cm calculus right renal pelvis. Minimal right hydronephrosis with peripelvic stranding. Small right renal cysts. Left kidney entirely replaced with cysts, largest 10.5 cm. Very minimal free fluid anterior to the liver. Dependent subcutaneous edema. [id number] Electronically signed by:  Abisai Arteaga MD  6/4/2017 8:40 AM CDT Workstation: [email or web address]    Us Abdomen Limited    Result Date: 6/3/2017  DATE OF EXAM: 6/3/2017 11:06 AM CDT PROCEDURE: US ABDOMEN LIMITED INDICATION FOR PROCEDURE: 83 years old patient presents for initial evaluation of transaminitis. TECHNIQUE: Multiple static grayscale images are obtained transabdominally. COMPARISON:  Renal ultrasound dated March 6, 2017 and CT the abdomen and pelvis dated May 16, 2017. FINDINGS: Images of the liver reveal normal homogeneous echogenicity. No masses are identified..  There are no dilated intrahepatic biliary ducts. Color Doppler documents hepatopedal blood flow within the portal vein. The gallbladder is present. Multiple gallstones are visible. There is no pericholecystic fluid or gallbladder wall thickening.  Common bile duct measures 6.8 mm  in greatest transverse dimension. The  right kidney is small in size with echogenic renal parenchyma without evidence for perinephric fluid or hydronephrosis. The right kidney measures 8.8 x 5.6 x 4.7 cm.  There are small right-sided renal cysts within the mid lower pole which measure 1.9 x 1.8 x 2.3 cm. The smaller cyst measures up to 10 mm in size. The left kidney was also imaged. Ultrasound reveals only what appears to be end-stage renal insufficiency and multiple cysts. The pancreas is obscured by bowel gas.. Abdominal aorta has a mildly irregular contour probably related to atherosclerotic disease.. Inferior vena cava is not visualized.. No ascites is visualized. Large right-sided pleural effusion is visualized.     1.  Echogenic right-sided renal parenchyma suggests sequela of chronic renal sufficiency. 2.  Cholelithiasis. 3.  Nonvisualization of pancreas. Electronically signed by:  Tori Arteaga MD  6/3/2017 2:42 PM CDT Workstation: [employer]      HISTORY OF PRESENT ILLNESS:   [place] He[place] Middleton is a 83 y.o. male who presents with increased generalized weakness. Family was trying to get him to go doctor today and could not raise arms or hold his own body weight up. This has been getting progressively worse for the past 4 days. Last week he was able to walk. Today they were trying to get to the [facility]. Patient went to the coumadin clinic 5/24/17. Recently he has had to go weekly as he has been having problems controlling it recently.     DIAGNOSTIC DATA:   PT/INR 6/2/17: >120/>18.0--> 6/3/17 16/1.28  CKMB 6/2/17: 3.34  Troponin 6/2/17: 0.404->0.291->0.333->0.382->0.411->0.334->0.328->0.297->0.326     HOSPITAL COURSE:  Patient came in with weakness and was found to have coumadin toxicity with an INR greater than 18 and was given vitamin K which resolved this, coumadin was held.  He was also found to have transaminitis and so Dr. Hoang was consulted, RUQ U/S was done which showed cholelithiasis.  MRCP was done which showed no  choledocholithiasis.  Patient then proceeded to develop pneumonia and was given azithromycin.  Patient continued to decline and then decided to go home on hospice care.  Patient was stable and ready for discharge to hospice on day of admission.  On day of discharge patient declined dialysis and all medications except for tylenol #3.            DISCHARGE CONDITION:   Stable.    DISPOSITION:  Hospice/Home    DISCHARGE MEDICATIONS   Roni Middleton   Home Medication Instructions NOVA:[id number]    Printed on:06/06/17 0047   Medication Information                      acetaminophen (TYLENOL) 650 MG suppository  Insert 1 suppository into the rectum Every 4 (Four) Hours As Needed for Mild Pain (1-3) or Fever.             LORazepam (ATIVAN) 1 MG tablet  Take 1 tablet by mouth Every 6 (Six) Hours As Needed for Anxiety.             Morphine 20 MG/ML concentrated solution  Take 0.25 mL by mouth Every 4 (Four) Hours As Needed for Severe Pain (7-10) (shortness of breath).             prochlorperazine (COMPAZINE) 25 MG suppository  Insert 1 suppository into the rectum Every 12 (Twelve) Hours As Needed for Nausea or Vomiting.             sucralfate (CARAFATE) 1 G tablet  Take 1 g by mouth 3 (Three) Times a Day. For 14 days.                 INSTRUCTIONS:  Activity:   Activity Instructions     Activity as Tolerated                   Diet:   Diet Instructions     Diet: Regular; Thin Liquids, No Restrictions       Discharge Diet:  Regular   Fluid Consistency:  Thin Liquids, No Restrictions               Special instructions: Patient instructed to call MD or return to ED with worsening shortness of breath, chest pain, fever greater than 100.4 degrees F or any other medical concerns..    FOLLOW UP:   Follow-up Information     Follow up with [facility] .    Specialty:  Hospice    Contact information:    [facility] [address] [phone]        Follow up with Dago Lopez MD .     Specialty:  Family Medicine    Contact information:    [address] [facility] [address]  [phone]            PENDING TEST RESULTS AT DISCHARGE   Order Current Status    CEA In process    Blood Culture Preliminary result    Blood Culture Preliminary result          Time: 30 minutes    Dr. Hernandez is the attending at time of discharge, he is aware of the patient's status and agrees with the above discharge summary.            This document has been electronically signed by Paulina Samuel MD on June 6, 2017 1:18 PM        I have examined the patient and reviewed the pertinent diagnostic data. I have discussed the case with the Family Medicine Resident and agree with the assessment and plan of care.    Home Hernandez DO           This document has been electronically signed by Home Hernandez DO on June 6, 2017 4:15 PM

## 2017-06-05 NOTE — PROGRESS NOTES
LOS: 3 days   Patient Care Team:  Dago Lopez MD as PCP - General    Chief Complaint: Pneumonia due to infectious organism    Subjective     Interval History: Declining and nonverbal    Patient Complaints: none  Patient Denies:  pain  History taken from: chart family RN    Review of Systems:    Review of Systems   Unable to perform ROS: Patient nonverbal       Objective     Vital Signs  Temp:  [96.4 °F (35.8 °C)-97 °F (36.1 °C)] 96.4 °F (35.8 °C)  Heart Rate:  [103-131] 124  Resp:  [18] 18  BP: (83-98)/(56-71) 86/56    Physical Exam:   Physical Exam   Constitutional: He appears well-developed and well-nourished. He appears cachectic. He appears toxic. He has a sickly appearance. He appears ill. He appears distressed.   HENT:   Head: Normocephalic and atraumatic.   Right Ear: External ear normal.   Left Ear: External ear normal.   Nose: Nose normal.   Mouth/Throat: Oropharynx is clear and moist.   Eyes: Conjunctivae and EOM are normal. Pupils are equal, round, and reactive to light. Right eye exhibits no discharge. Left eye exhibits no discharge. No scleral icterus.   Neck: Normal range of motion. Neck supple. No JVD present. No tracheal deviation present. No thyromegaly present.   Cardiovascular: Regular rhythm, normal heart sounds and intact distal pulses.  Tachycardia present.  Exam reveals no gallop and no friction rub.    No murmur heard.  Pulmonary/Chest: Effort normal. No stridor. No respiratory distress. He has no wheezes. He has rhonchi. He has no rales. He exhibits no tenderness.   Abdominal: Soft. Bowel sounds are normal. He exhibits no distension and no mass. There is no tenderness. There is no rebound and no guarding. No hernia.   Musculoskeletal: Normal range of motion. He exhibits no edema or deformity.   Lymphadenopathy:     He has no cervical adenopathy.   Neurological: He is unresponsive. He exhibits abnormal muscle tone. Coordination normal.   Skin: Skin is warm and dry. No erythema.    jaundiced   Psychiatric: He has a normal mood and affect. His behavior is normal. Judgment and thought content normal.   Nursing note and vitals reviewed.       Results Review:       Lab Results (last 24 hours)     Procedure Component Value Units Date/Time    Troponin [[id number]]  (Abnormal) Collected:  06/04/17 1232    Specimen:  Blood Updated:  06/04/17 1314     Troponin I 0.297 (C) ng/mL     Troponin [[id number]]  (Abnormal) Collected:  06/04/17 1745    Specimen:  Blood Updated:  06/04/17 1820     Troponin I 0.326 (C) ng/mL     Blood Culture [[id number]]  (Normal) Collected:  06/03/17 2232    Specimen:  Blood from Arm, Right Updated:  06/04/17 2302     Blood Culture No growth at 24 hours    Blood Culture [[id number]]  (Normal) Collected:  06/03/17 2243    Specimen:  Blood from Hand, Right Updated:  06/04/17 2302     Blood Culture No growth at 24 hours          Medication Review:   Current Facility-Administered Medications   Medication Dose Route Frequency Provider Last Rate Last Dose   • acetaminophen-codeine (TYLENOL #3) 300-30 MG per tablet 1 tablet  1 tablet Oral Q4H PRN Aime Moreira MD   1 tablet at 06/05/17 0356   • azithromycin (ZITHROMAX) tablet 250 mg  250 mg Oral Q24H Aime Moreira MD       • carvedilol (COREG) tablet 12.5 mg  12.5 mg Oral BID With Meals Paulina Samuel MD   12.5 mg at 06/03/17 1811   • cefTRIAXone (ROCEPHIN) 1 g/100 mL 0.9% NS (MBP)  1 g Intravenous Q24H Aime Moreira MD   1 g at 06/03/17 2133   • colchicine tablet 0.6 mg  0.6 mg Oral Daily Paulina Samuel MD   0.6 mg at 06/04/17 1002   • diltiaZEM CD (CARDIZEM CD) 24 hr capsule 120 mg  120 mg Oral Daily Paulina Samuel MD   120 mg at 06/04/17 1002   • heparin (porcine) injection 4,100 Units  4,100 Units Intracatheter Daily PRN Ezio Sweeney MD   4,100 Units at 06/03/17 1040   • isosorbide mononitrate (IMDUR) 24 hr tablet 30 mg  30 mg Oral Daily Paulina Samuel MD   30 mg at  06/03/17 1251   • magic butt ointment   Topical BID Paulina Samuel MD       • Morphine sulfate (PF) injection 2 mg  2 mg Intravenous Q4H PRN Aime Moreira MD   2 mg at 06/05/17 0813   • ranolazine (RANEXA) 12 hr tablet 500 mg  500 mg Oral BID Paulina Samuel MD   500 mg at 06/03/17 1811   • sodium chloride 0.9 % flush 1-10 mL  1-10 mL Intravenous PRN Paulina Samuel MD   10 mL at 06/05/17 0814       Assessment/Plan     Principal Problem:    Pneumonia due to infectious organism  Active Problems:    PAF (paroxysmal atrial fibrillation)    Elevated liver enzymes    Cholelithiases    Chronic atrial fibrillation    ESRD (end stage renal disease) on dialysis    Plan    Terminally ill secondary to multi organ failure  Home with hospice.      I have examined the patient and reviewed the pertinent diagnostic data. I have discussed the case with the Family Medicine Resident and agree with the assessment and plan of care.    Home Hernandez DO           This document has been electronically signed by Home Hernandez DO on June 5, 2017 11:14 AM

## 2017-06-05 NOTE — PAYOR COMM NOTE
"Sarita Middleton (83 y.o. Male)     Date of Birth Social Security Number Address Home Phone MRN    1933  [address]  [place] [address] [phone] [id number]    Adventism Marital Status          None        Admission Date Admission Type Admitting Provider Attending Provider Department, Room/Bed    17 [facility] DO Saunders, Stacey Dianne, MD [address], 311/1    Discharge Date Discharge Disposition Discharge Destination         Hospice/Home             Attending Provider: Paulina Samuel MD     Allergies:  Flomax [Tamsulosin Hcl], Motrin [Ibuprofen]    Isolation:  None   Infection:  None   Code Status:  Comfort Analia    Ht:  74\" (188 cm)   Wt:  147 lb (66.7 kg)    Admission Cmt:  None   Principal Problem:  Pneumonia due to infectious organism [J18.9] More...                 Active Insurance as of 2017     Primary Coverage     Payor Plan Insurance Group Employer/Plan Group    HUMANA MEDICARE REPLACEMENT HUMANA MEDICARE REPL [id number]     Payor Plan Address Payor Plan Phone Number Effective From Effective To    [address] [address] [phone] 2016     [place] [address]       Subscriber Name Subscriber Birth Date Member ID       SARITA MIDDLETON 1933 [id number]                 Emergency Contacts      (Rel.) Home Phone Work Phone Mobile Phone    Madai Middleton (Spouse) [phone] -- [phone]               History & Physical      Home Hernandez DO at 2017  2:53 PM              HISTORY AND PHYSICAL  NAME: Sarita Middleton  : 1933  MRN: [id number]    DATE OF ADMISSION: 17    DATE & TIME SEEN: 17 6:45 PM    PCP: Dago Lopez MD    CODE STATUS: Prior    CHIEF COMPLAINT Increasing weakness.     HPI:  Sarita Middleton is a 83 y.o. male who presents with increased generalized weakness. Family was trying to get him to go doctor today and could not raise arms " or hold his own body weight up.  This has been getting progressively worse for the past 4 days.  Last week he was able to walk.  Today they were trying to get to the [facility].  Patient went to the coumadin clinic 5/24/17.  Recently he has had to go weekly as he has been having problems controlling it recently.     CONCURRENT MEDICAL HISTORY:  Past Medical History:   Diagnosis Date   • A-fib    • Bleeding ulcer     approx 9 years ago prior to by pass surgery   • BPH (benign prostatic hyperplasia)    • Coagulopathy     coumadin   • Coronary artery disease     pt relates has been told in past has leaky valve   • Hearing loss    • History of echocardiogram 07/16/2013    flow 04764 (1)    Biatrial enlargement with mild CLVH with normal aortic root size. LV systolic function well preserved with EF of 55-60%. Mitral and AV thickened. Tricuspid valve intact. Diastolic dysfunction.    • Hypertension     since dialysis has placed some of bp meds on hold   • Kidney disease    • Kidney failure    • Macular degeneration    • Macular degeneration    • Nausea & vomiting    • Stroke     mini   • Wears glasses        PAST SURGICAL HISTORY:  Past Surgical History:   Procedure Laterality Date   • ARTERIOVENOUS FISTULA/SHUNT SURGERY Left 5/2/2017    Procedure: LEFT BRACHIAL ARTERY TO AXILLARY VEIN  ARTERIOVENOUS GRAFT     (artegraft);  Surgeon: Jovanni Esparza MD;  Location: [facility] OR;  Service:    • BACK SURGERY     • CAROTID ENDARTERECTOMY     • CORONARY ARTERY BYPASS GRAFT     • EYE SURGERY     • FOREIGN BODY REMOVAL Left     foot   • HERNIA REPAIR      ingunial right and left   • INTERVENTIONAL RADIOLOGY PROCEDURE N/A 3/14/2017    Procedure: tunneled central venous catheter placement;  Surgeon: Jovanni Esparza MD;  Location: [facility] LOCATION;  Service:        FAMILY HISTORY:  Family History   Problem Relation Age of Onset   • Heart disease Father    • Alcohol abuse Son         SOCIAL  HISTORY:  Social History     Social History   • Marital status:      Spouse name: N/A   • Number of children: N/A   • Years of education: N/A     Occupational History   • Not on file.     Social History Main Topics   • Smoking status: Former Smoker     Quit date: 2/5/1970   • Smokeless tobacco: Never Used   • Alcohol use No   • Drug use: No      Comment: prescrition   • Sexual activity: Defer     Other Topics Concern   • Not on file     Social History Narrative       HOME MEDICATIONS:    Current Facility-Administered Medications:   •  sodium chloride 0.9 % infusion, 125 mL/hr, Intravenous, Continuous, Chivo Fernandez MD, Last Rate: 125 mL/hr at 06/02/17 1319, 125 mL/hr at 06/02/17 1319    Current Outpatient Prescriptions:   •  acetaminophen-codeine (TYLENOL #3) 300-30 MG per tablet, Take 1 tablet by mouth Every 6 (Six) Hours As Needed for moderate pain (4-6)., Disp: , Rfl:   •  carvedilol (COREG) 12.5 MG tablet, Take 1 tablet by mouth 2 (Two) Times a Day With Meals. (Patient not taking: Reported on 6/2/2017), Disp: 60 tablet, Rfl: 0  •  colchicine 0.6 MG tablet, Take 0.6 mg by mouth Daily., Disp: , Rfl:   •  diltiaZEM CD (CARDIZEM CD) 120 MG 24 hr capsule, Take 1 capsule by mouth Daily., Disp: 30 capsule, Rfl: 0  •  isosorbide mononitrate (IMDUR) 30 MG 24 hr tablet, Take 30 mg by mouth Daily., Disp: , Rfl:   •  Misc Natural Products (BLACK CHERRY CONCENTRATE PO), Take 1 capsule by mouth Daily. For gout (1 capsule = 500 mg), Disp: , Rfl:   •  ranolazine (RANEXA) 500 MG 12 hr tablet, Take 500 mg by mouth 2 (Two) Times a Day., Disp: , Rfl:   •  sucralfate (CARAFATE) 1 G tablet, Take 1 g by mouth 3 (Three) Times a Day. For 14 days., Disp: , Rfl:   •  warfarin (COUMADIN) 2.5 MG tablet, Take one tablet daily except on Monday, take two tablets or as Directed by Coumadin Clinic, Disp: 100 tablet, Rfl: 1    ALLERGIES:  Flomax [tamsulosin hcl] and Motrin [ibuprofen]    REVIEW OF SYSTEMS  Review of Systems    Constitutional: Positive for appetite change (throws up most of his food for the past month) and fatigue. Negative for chills, diaphoresis, fever and unexpected weight change.   HENT: Positive for hearing loss, sore throat and tinnitus. Negative for congestion, rhinorrhea, sinus pressure and sneezing.    Eyes: Positive for itching. Negative for pain and redness.   Respiratory: Positive for cough (non productive) and shortness of breath. Negative for chest tightness.    Cardiovascular: Positive for leg swelling. Negative for chest pain.   Gastrointestinal: Positive for constipation (no bowel movement in 5 days), nausea and vomiting (mainly dry heaves). Negative for abdominal pain, blood in stool and diarrhea.   Endocrine: Negative.    Genitourinary: Negative for dysuria and hematuria.   Skin: Positive for wound (coccyx pressure deterioration). Negative for rash.   Neurological: Positive for weakness. Negative for dizziness, syncope, light-headedness and numbness.   Psychiatric/Behavioral: Positive for confusion and hallucinations (for the past month). Negative for suicidal ideas.       PHYSICAL EXAM:  Temp:  [97.3 °F (36.3 °C)] 97.3 °F (36.3 °C)  Heart Rate:  [] 128  Resp:  [18] 18  BP: (103-124)/(66-79) 123/79  Body mass index is 21.18 kg/(m^2).  Physical Exam   Constitutional: He is oriented to person, place, and time. He appears cachectic. He is active.   HENT:   Head: Normocephalic and atraumatic.   Eyes: No scleral icterus.   Cardiovascular: An irregularly irregular rhythm present. Tachycardia present.  Exam reveals no gallop and no friction rub.    No murmur heard.  Pulses:       Carotid pulses are 3+ on the right side       Radial pulses are 2+ on the right side, and 2+ on the left side.        Dorsalis pedis pulses are 1+ on the right side, and 1+ on the left side.   Pulmonary/Chest: Effort normal and breath sounds normal. No respiratory distress. He has no wheezes. He has no rales. He exhibits no  tenderness.   Abdominal: Soft. Bowel sounds are normal. He exhibits no distension and no mass. There is no tenderness. There is no rebound and no guarding. No hernia.   Neurological: He is alert and oriented to person, place, and time.       DIAGNOSTIC DATA:   Lab Results (last 24 hours)     Procedure Component Value Units Date/Time    Magnesium [[id number]]  (Normal) Collected:  06/02/17 1144    Specimen:  Blood Updated:  06/02/17 1248     Magnesium 2.2 mg/dL     Amylase [[id number]]  (Normal) Collected:  06/02/17 1144    Specimen:  Blood Updated:  06/02/17 1248     Amylase 56 U/L     Lipase [[id number]]  (Normal) Collected:  06/02/17 1144    Specimen:  Blood Updated:  06/02/17 1248     Lipase 65 U/L     CK [[id number]]  (Abnormal) Collected:  06/02/17 1144    Specimen:  Blood Updated:  06/02/17 1248     Creatine Kinase 233 (H) U/L     Comprehensive Metabolic Panel [[id number]]  (Abnormal) Collected:  06/02/17 1144    Specimen:  Blood Updated:  06/02/17 1252     Glucose 79 mg/dL      BUN 41 (H) mg/dL      Creatinine 4.44 (H) mg/dL      Sodium 131 (L) mmol/L      Potassium 4.3 mmol/L      Chloride 89 (L) mmol/L      CO2 26.0 mmol/L      Calcium 9.1 mg/dL      Total Protein 6.3 g/dL      Albumin 3.50 g/dL      ALT (SGPT) 118 (H) U/L      AST (SGOT) 179 (H) U/L      Alkaline Phosphatase 93 U/L      Total Bilirubin 4.1 (H) mg/dL      eGFR Non African Amer 13 (L) mL/min/1.73      Globulin 2.8 gm/dL      A/G Ratio 1.3 g/dL      BUN/Creatinine Ratio 9.2     Anion Gap 16.0 (H) mmol/L     Narrative:       The MDRD GFR formula is only valid for adults with stable renal function between ages 18 and 70.    CBC Auto Differential [[id number]]  (Abnormal) Collected:  06/02/17 1144    Specimen:  Blood Updated:  06/02/17 1253     WBC 5.64 10*3/mm3      RBC 4.22 (L) 10*6/mm3      Hemoglobin 13.2 (L) g/dL      Hematocrit 39.6 %      MCV 93.8 fL      MCH 31.3 pg      MCHC 33.3 g/dL      RDW 23.1 (H) %      RDW-SD 77.1 (H) fl      MPV -- fL        INSTRUMENT UNABLE TO CALCULATE RESULT        Platelets 125 (L) 10*3/mm3      Neutrophil % 77.0 %      Lymphocyte % 11.7 %      Monocyte % 9.6 %      Eosinophil % 0.5 %      Basophil % 0.5 %      Immature Grans % 0.7 (H) %      Neutrophils, Absolute 4.34 10*3/mm3      Lymphocytes, Absolute 0.66 10*3/mm3      Monocytes, Absolute 0.54 10*3/mm3      Eosinophils, Absolute 0.03 10*3/mm3      Basophils, Absolute 0.03 10*3/mm3      Immature Grans, Absolute 0.04 (H) 10*3/mm3      nRBC 0.0 /100 WBC     CK-MB [[id number]]  (Normal) Collected:  06/02/17 1144    Specimen:  Blood Updated:  06/02/17 1258     CKMB 3.34 ng/mL     Troponin [[id number]]  (Abnormal) Collected:  06/02/17 1144    Specimen:  Blood Updated:  06/02/17 1301     Troponin I 0.404 (C) ng/mL     TSH [[id number]]  (Abnormal) Collected:  06/02/17 1144    Specimen:  Blood Updated:  06/02/17 1317     TSH 6.320 (H) mIU/mL     Protime-INR [[id number]]  (Abnormal) Collected:  06/02/17 1242    Specimen:  Blood Updated:  06/02/17 1323     Protime >120.0 (H) Seconds      INR >18.00 (C)      Results confirmed by recollection       Narrative:       Therapeutic range for most indications is 2.0-3.0 INR,  or 2.5-3.5 for mechanical heart valves.    Extra Tubes [[id number]] Collected:  06/02/17 1243    Specimen:  Blood from Blood, Venous Line Updated:  06/02/17 1401    Narrative:       The following orders were created for panel order Extra Tubes.  Procedure                               Abnormality         Status                     ---------                               -----------         ------                     Green Top (Gel)[[id number]]                                  Final result                 Please view results for these tests on the individual orders.    Green Top (Gel) [[id number]] Collected:  06/02/17 1243    Specimen:  Blood Updated:  06/02/17 1401     Extra Tube Hold for add-ons.      Auto resulted.       CBC & Differential [[id number]] Collected:  06/02/17  1144    Specimen:  Blood Updated:  06/02/17 1434    Narrative:       The following orders were created for panel order CBC & Differential.  Procedure                               Abnormality         Status                     ---------                               -----------         ------                     Scan Slide[[id number]]                                       Final result               CBC Auto Differential[[id number]]        Abnormal            Final result                 Please view results for these tests on the individual orders.    Scan Slide [[id number]] Collected:  06/02/17 1144    Specimen:  Blood Updated:  06/02/17 1434     Anisocytosis Large/3+     Dacrocytes Mod/2+     Polychromasia Slight/1+     Schistocytes Slight/1+     WBC Morphology Normal     Platelet Estimate Decreased     Clumped Platelets --      NONE SEEN       Protime-INR [[id number]]  (Abnormal) Collected:  06/02/17 1544    Specimen:  Blood Updated:  06/02/17 1705     Protime >120.0 (H) Seconds      INR >13.00 (C)    Narrative:       Therapeutic range for most indications is 2.0-3.0 INR,  or 2.5-3.5 for mechanical heart valves.           Imaging Results (last 24 hours)     Procedure Component Value Units Date/Time    XR Chest 1 View [[id number]] Collected:  06/02/17 1235     Updated:  06/02/17 1252    Narrative:         EXAM:         Radiograph(s), Chest   VIEWS:   Portable ; 1       DATE/TIME: 6/2/2017 12:49 PM CDT               INDICATION:     Weakness      COMPARISON:   CXR: 3/13/17          FINDINGS:           - lines/tubes:     Right approach large caliber central venous  catheter in standard position.     - cardiac:          size within normal limits         - mediastinum:  contour within normal limits         - lungs:          no focal air space process, pulmonary  interstitial edema, nodule(s)/mass             - pleura:          Tiny left pleural fluid collection of doubtful  clinical significance.                  - osseous:           Status post CABG                  - misc.:        Impression:       CONCLUSION:        1. No evidence of an acute cardiopulmonary process.                      Electronically signed by:  SAROJ Ortega MD  6/2/2017 12:52  PM CDT Workstation: [id number]          I reviewed the patient's new clinical results.    ASSESSMENT AND PLAN: This is a 83 y.o. male with:    Active Hospital Problems (** Indicates Principal Problem)    Diagnosis Date Noted   • Coumadin toxicity [T45.511A] 06/02/2017     -Will Hold coumadin  -Vitamin K protocol  -Daily INR  -INR on admission >18     • Elevated liver enzymes [R74.8] 06/02/2017     -f/u RUQ U/S  -Dr. Hoang consulted, agreed to see patient  -MRCP tomorrow morning       • Cholelithiases [K80.20] 06/02/2017     -seen on CT 2 weeks ago  -RUQ ultrasound     • Chronic atrial fibrillation [I48.2] 06/02/2017     -continue home meds     • ESRD (end stage renal disease) on dialysis [N18.6, Z99.2] 06/02/2017     Dr. Holguin Consulted will dialyze patient tomorrow  MWF dialysis normally, missed dialysis today     • PAF (paroxysmal atrial fibrillation) [I48.0] 03/09/2017     Continue home medications        Resolved Hospital Problems    Diagnosis Date Noted Date Resolved   No resolved problems to display.       DVT prophylaxis: SCD/RAYRAY  Code status is Prior DNI  BOAZ # [id number], reviewed and consistent with patient reported medications.      I discussed the patients findings and my recommendations with patient and family.     Dr. Hernandez is the attending on record at time of admission, he is aware of the patient's status and agrees with the above history and physical.          This document has been electronically signed by Paulina Samuel MD on June 2, 2017 6:45 PM      I have examined the patient and reviewed the pertinent diagnostic data. I have discussed the case with the Family Medicine Resident and agree with the assessment and plan of care.    Home Hernandez, DO  "          This document has been electronically signed by Home Hernandez DO on Nena 3, 2017 2:04 PM       Electronically signed by Home Hernandez DO at 6/3/2017  2:04 PM      Home Hernandez DO at 2017  5:29 PM              HISTORY AND PHYSICAL  NAME: Roni Middleton  : 1933  MRN: [id number]    DATE OF ADMISSION: 17    DATE & TIME SEEN: 17 5:38 PM    PCP: Dago Lopez MD    CODE STATUS: Full    CHIEF COMPLAINT fatigue, anorexia, listlessness for past few days.    HPI:      Roni Middleton is a 83 y.o. male who presents with several days of \"feeling bad\". He is not eating and has very little energy. He spends most of his time sleeping. He cannot support his own weight.    CONCURRENT MEDICAL HISTORY:  Past Medical History:   Diagnosis Date   • A-fib    • Bleeding ulcer     approx 9 years ago prior to by pass surgery   • BPH (benign prostatic hyperplasia)    • Coagulopathy     coumadin   • Coronary artery disease     pt relates has been told in past has leaky valve   • Hearing loss    • History of echocardiogram 2013    flow 58219 (1)    Biatrial enlargement with mild CLVH with normal aortic root size. LV systolic function well preserved with EF of 55-60%. Mitral and AV thickened. Tricuspid valve intact. Diastolic dysfunction.    • Hypertension     since dialysis has placed some of bp meds on hold   • Kidney disease    • Kidney failure    • Macular degeneration    • Macular degeneration    • Nausea & vomiting    • Stroke     mini   • Wears glasses        PAST SURGICAL HISTORY:  Past Surgical History:   Procedure Laterality Date   • ARTERIOVENOUS FISTULA/SHUNT SURGERY Left 2017    Procedure: LEFT BRACHIAL ARTERY TO AXILLARY VEIN  ARTERIOVENOUS GRAFT     (artegraft);  Surgeon: Jovanni Esparza MD;  Location: [facility];  Service:    • BACK SURGERY     • CAROTID ENDARTERECTOMY     • CORONARY ARTERY BYPASS GRAFT     • EYE SURGERY     • FOREIGN BODY " REMOVAL Left     foot   • HERNIA REPAIR      ingunial right and left   • INTERVENTIONAL RADIOLOGY PROCEDURE N/A 3/14/2017    Procedure: tunneled central venous catheter placement;  Surgeon: Jovanni Esparza MD;  Location: [facility] LOC[facility];  Service:        FAMILY HISTORY:  Family History   Problem Relation Age of Onset   • Heart disease Father    • Alcohol abuse Son         SOCIAL HISTORY:  Social History     Social History   • Marital status:      Spouse name: N/A   • Number of children: N/A   • Years of education: N/A     Occupational History   • Not on file.     Social History Main Topics   • Smoking status: Former Smoker     Quit date: 2/5/1970   • Smokeless tobacco: Never Used   • Alcohol use No   • Drug use: No      Comment: prescrition   • Sexual activity: Defer     Other Topics Concern   • Not on file     Social History Narrative       HOME MEDICATIONS:    Current Facility-Administered Medications:   •  prothrombin complex conc human (KCentra) IV solution 3,826 Units, 3,826 Units, Intravenous, Once **AND** phytonadione (AQUA-MEPHYTON, VITAMIN K) 10 mg in sodium chloride 0.9 % 50 mL IVPB, 10 mg, Intravenous, Once **AND** Protime-INR, , , Once **AND** Protime-INR, , , Once **AND** [START ON 6/3/2017] Protime-INR, , , Once **AND** Monitor for signs for thromboembolic events, , , Continuous, Chivo Fernandez MD  •  sodium chloride 0.9 % infusion, 125 mL/hr, Intravenous, Continuous, Chivo Fernandez MD, Last Rate: 125 mL/hr at 06/02/17 1319, 125 mL/hr at 06/02/17 1319    Current Outpatient Prescriptions:   •  acetaminophen-codeine (TYLENOL #3) 300-30 MG per tablet, Take 1 tablet by mouth Every 6 (Six) Hours As Needed for moderate pain (4-6)., Disp: , Rfl:   •  carvedilol (COREG) 12.5 MG tablet, Take 1 tablet by mouth 2 (Two) Times a Day With Meals. (Patient not taking: Reported on 6/2/2017), Disp: 60 tablet, Rfl: 0  •  colchicine 0.6 MG tablet, Take 0.6 mg by mouth Daily., Disp:  , Rfl:   •  diltiaZEM CD (CARDIZEM CD) 120 MG 24 hr capsule, Take 1 capsule by mouth Daily., Disp: 30 capsule, Rfl: 0  •  isosorbide mononitrate (IMDUR) 30 MG 24 hr tablet, Take 30 mg by mouth Daily., Disp: , Rfl:   •  Misc Natural Products (BLACK CHERRY CONCENTRATE PO), Take 1 capsule by mouth Daily. For gout (1 capsule = 500 mg), Disp: , Rfl:   •  ranolazine (RANEXA) 500 MG 12 hr tablet, Take 500 mg by mouth 2 (Two) Times a Day., Disp: , Rfl:   •  sucralfate (CARAFATE) 1 G tablet, Take 1 g by mouth 3 (Three) Times a Day. For 14 days., Disp: , Rfl:   •  warfarin (COUMADIN) 2.5 MG tablet, Take one tablet daily except on Monday, take two tablets or as Directed by Coumadin Clinic, Disp: 100 tablet, Rfl: 1    ALLERGIES:  Flomax [tamsulosin hcl] and Motrin [ibuprofen]    REVIEW OF SYSTEMS  Review of Systems   Constitutional: Positive for appetite change and fatigue. Negative for activity change, chills and fever.   HENT: Negative for ear pain and sore throat.    Eyes: Negative for pain and visual disturbance.   Respiratory: Positive for shortness of breath. Negative for cough.    Cardiovascular: Positive for chest pain. Negative for palpitations.   Gastrointestinal: Negative for abdominal pain and nausea.   Endocrine: Negative for cold intolerance and heat intolerance.   Genitourinary: Negative for difficulty urinating and dysuria.   Musculoskeletal: Negative for arthralgias and gait problem.   Skin: Negative for color change and rash.   Neurological: Negative for dizziness, weakness and headaches.   Hematological: Negative for adenopathy. Does not bruise/bleed easily.   Psychiatric/Behavioral: Negative for agitation, confusion and sleep disturbance.       PHYSICAL EXAM:  Temp:  [97.3 °F (36.3 °C)] 97.3 °F (36.3 °C)  Heart Rate:  [] 127  Resp:  [18] 18  BP: (103-124)/(66-78) 124/78  Body mass index is 21.18 kg/(m^2).  Physical Exam   Constitutional: He is oriented to person, place, and time. He appears  well-developed and well-nourished. He appears cachectic. He has a sickly appearance. He appears ill.   HENT:   Head: Normocephalic and atraumatic.   Right Ear: External ear normal.   Left Ear: External ear normal.   Nose: Nose normal.   Mouth/Throat: Oropharynx is clear and moist.   Eyes: Conjunctivae and EOM are normal. Pupils are equal, round, and reactive to light. Right eye exhibits no discharge. Left eye exhibits no discharge. No scleral icterus.   Neck: Normal range of motion. Neck supple. No JVD present. No tracheal deviation present. No thyromegaly present.   Cardiovascular: Normal heart sounds and intact distal pulses.  An irregularly irregular rhythm present. Tachycardia present.  Exam reveals no gallop and no friction rub.    No murmur heard.  Bilateral pitting edema in lower extremities.   Pulmonary/Chest: Effort normal and breath sounds normal. No stridor. No respiratory distress. He has no wheezes. He has no rales. He exhibits no tenderness.   Abdominal: Soft. Bowel sounds are normal. He exhibits no distension and no mass. There is no tenderness. There is no rebound and no guarding. No hernia.   Musculoskeletal: Normal range of motion. He exhibits no edema or deformity.   Lymphadenopathy:     He has no cervical adenopathy.   Neurological: He is alert and oriented to person, place, and time. No sensory deficit. He exhibits normal muscle tone. Coordination normal.   Skin: Skin is warm and dry. No erythema.   Psychiatric: He has a normal mood and affect. His behavior is normal. Judgment and thought content normal.   Nursing note and vitals reviewed.      DIAGNOSTIC DATA:   Lab Results (last 24 hours)     Procedure Component Value Units Date/Time    Magnesium [[id number]]  (Normal) Collected:  06/02/17 1144    Specimen:  Blood Updated:  06/02/17 1248     Magnesium 2.2 mg/dL     Amylase [[id number]]  (Normal) Collected:  06/02/17 1144    Specimen:  Blood Updated:  06/02/17 1248     Amylase 56 U/L     Lipase  [[id number]]  (Normal) Collected:  06/02/17 1144    Specimen:  Blood Updated:  06/02/17 1248     Lipase 65 U/L     CK [[id number]]  (Abnormal) Collected:  06/02/17 1144    Specimen:  Blood Updated:  06/02/17 1248     Creatine Kinase 233 (H) U/L     Comprehensive Metabolic Panel [[id number]]  (Abnormal) Collected:  06/02/17 1144    Specimen:  Blood Updated:  06/02/17 1252     Glucose 79 mg/dL      BUN 41 (H) mg/dL      Creatinine 4.44 (H) mg/dL      Sodium 131 (L) mmol/L      Potassium 4.3 mmol/L      Chloride 89 (L) mmol/L      CO2 26.0 mmol/L      Calcium 9.1 mg/dL      Total Protein 6.3 g/dL      Albumin 3.50 g/dL      ALT (SGPT) 118 (H) U/L      AST (SGOT) 179 (H) U/L      Alkaline Phosphatase 93 U/L      Total Bilirubin 4.1 (H) mg/dL      eGFR Non African Amer 13 (L) mL/min/1.73      Globulin 2.8 gm/dL      A/G Ratio 1.3 g/dL      BUN/Creatinine Ratio 9.2     Anion Gap 16.0 (H) mmol/L     Narrative:       The MDRD GFR formula is only valid for adults with stable renal function between ages 18 and 70.    CBC Auto Differential [[id number]]  (Abnormal) Collected:  06/02/17 1144    Specimen:  Blood Updated:  06/02/17 1253     WBC 5.64 10*3/mm3      RBC 4.22 (L) 10*6/mm3      Hemoglobin 13.2 (L) g/dL      Hematocrit 39.6 %      MCV 93.8 fL      MCH 31.3 pg      MCHC 33.3 g/dL      RDW 23.1 (H) %      RDW-SD 77.1 (H) fl      MPV -- fL       INSTRUMENT UNABLE TO CALCULATE RESULT        Platelets 125 (L) 10*3/mm3      Neutrophil % 77.0 %      Lymphocyte % 11.7 %      Monocyte % 9.6 %      Eosinophil % 0.5 %      Basophil % 0.5 %      Immature Grans % 0.7 (H) %      Neutrophils, Absolute 4.34 10*3/mm3      Lymphocytes, Absolute 0.66 10*3/mm3      Monocytes, Absolute 0.54 10*3/mm3      Eosinophils, Absolute 0.03 10*3/mm3      Basophils, Absolute 0.03 10*3/mm3      Immature Grans, Absolute 0.04 (H) 10*3/mm3      nRBC 0.0 /100 WBC     CK-MB [[id number]]  (Normal) Collected:  06/02/17 1144    Specimen:  Blood Updated:  06/02/17  1258     CKMB 3.34 ng/mL     Troponin [[id number]]  (Abnormal) Collected:  06/02/17 1144    Specimen:  Blood Updated:  06/02/17 1301     Troponin I 0.404 (C) ng/mL     TSH [[id number]]  (Abnormal) Collected:  06/02/17 1144    Specimen:  Blood Updated:  06/02/17 1317     TSH 6.320 (H) mIU/mL     Protime-INR [[id number]]  (Abnormal) Collected:  06/02/17 1242    Specimen:  Blood Updated:  06/02/17 1323     Protime >120.0 (H) Seconds      INR >18.00 (C)      Results confirmed by recollection       Narrative:       Therapeutic range for most indications is 2.0-3.0 INR,  or 2.5-3.5 for mechanical heart valves.    Extra Tubes [[id number]] Collected:  06/02/17 1243    Specimen:  Blood from Blood, Venous Line Updated:  06/02/17 1401    Narrative:       The following orders were created for panel order Extra Tubes.  Procedure                               Abnormality         Status                     ---------                               -----------         ------                     Green Top (Gel)[[id number]]                                  Final result                 Please view results for these tests on the individual orders.    Green Top (Gel) [[id number]] Collected:  06/02/17 1243    Specimen:  Blood Updated:  06/02/17 1401     Extra Tube Hold for add-ons.      Auto resulted.       CBC & Differential [[id number]] Collected:  06/02/17 1144    Specimen:  Blood Updated:  06/02/17 1434    Narrative:       The following orders were created for panel order CBC & Differential.  Procedure                               Abnormality         Status                     ---------                               -----------         ------                     Scan Slide[[id number]]                                       Final result               CBC Auto Differential[[id number]]        Abnormal            Final result                 Please view results for these tests on the individual orders.    Scan Slide [[id number]] Collected:  06/02/17  1144    Specimen:  Blood Updated:  06/02/17 1434     Anisocytosis Large/3+     Dacrocytes Mod/2+     Polychromasia Slight/1+     Schistocytes Slight/1+     WBC Morphology Normal     Platelet Estimate Decreased     Clumped Platelets --      NONE SEEN       Protime-INR [[id number]]  (Abnormal) Collected:  06/02/17 1544    Specimen:  Blood Updated:  06/02/17 1705     Protime >120.0 (H) Seconds      INR >13.00 (C)    Narrative:       Therapeutic range for most indications is 2.0-3.0 INR,  or 2.5-3.5 for mechanical heart valves.           Imaging Results (last 24 hours)     Procedure Component Value Units Date/Time    XR Chest 1 View [[id number]] Collected:  06/02/17 1235     Updated:  06/02/17 1252    Narrative:         EXAM:         Radiograph(s), Chest   VIEWS:   Portable ; 1       DATE/TIME: 6/2/2017 12:49 PM CDT               INDICATION:     Weakness      COMPARISON:   CXR: 3/13/17          FINDINGS:           - lines/tubes:     Right approach large caliber central venous  catheter in standard position.     - cardiac:          size within normal limits         - mediastinum:  contour within normal limits         - lungs:          no focal air space process, pulmonary  interstitial edema, nodule(s)/mass             - pleura:          Tiny left pleural fluid collection of doubtful  clinical significance.                  - osseous:          Status post CABG                  - misc.:        Impression:       CONCLUSION:        1. No evidence of an acute cardiopulmonary process.                      Electronically signed by:  SAROJ Ortega MD  6/2/2017 12:52  PM CDT Workstation: [email or web address]          I reviewed the patient's new clinical results.  I reviewed the patient's new imaging results and agree with the interpretation.  I reviewed the patient's other test results and agree with the interpretation    ASSESSMENT AND PLAN: This is a 83 y.o. male with:    Active Hospital Problems (** Indicates Principal Problem)     Diagnosis Date Noted   • Coumadin toxicity [T45.511A] 06/02/2017   • Elevated liver enzymes [R74.8] 06/02/2017   • Cholelithiases [K80.20] 06/02/2017   • Chronic atrial fibrillation [I48.2] 06/02/2017      Resolved Hospital Problems    Diagnosis Date Noted Date Resolved   No resolved problems to display.       Vitamin K for hypocoag state secondary to Coumadin toxicity  Monitor coags  Monitor transaminases and bili and consult GI  Rate control for a fib and consult cardio prn.    I have examined the patient and reviewed the pertinent diagnostic data. I have discussed the case with the Family Medicine Resident and agree with the assessment and plan of care.    Home Hernandez DO           This document has been electronically signed by Home Hernandez DO on June 2, 2017 5:40 PM       Electronically signed by Home Hernandez DO at 6/2/2017  5:40 PM           Emergency Department Notes      Chivo Fernandez MD at 6/2/2017 12:14 PM      Procedure Orders:    1. ECG 12 Lead [[id number]] ordered by Chivo Fernandez MD at 06/02/17 1103                Subjective   HPI Comments: Progressive generalized weakness for the past week. Decreased urine output and appetite. Pt states that everytime he eats, he vomits.   CABG 8 years ago. Dialysis x 2 months with Dr. Holguin.    Patient is a 83 y.o. male presenting with weakness.   Weakness - Generalized   Severity:  Moderate  Onset quality:  Gradual  Duration:  1 week  Timing:  Constant  Progression:  Worsening  Chronicity:  New  Relieved by:  Nothing  Worsened by:  Nothing  Ineffective treatments:  None tried  Associated symptoms: nausea, shortness of breath and vomiting    Associated symptoms: no abdominal pain, no chest pain, no cough, no diarrhea, no dizziness, no dysuria, no fever, no frequency, no headaches, no myalgias, no near-syncope, no seizures and no urgency        Review of Systems   Constitutional: Negative for appetite change, chills, diaphoresis,  fatigue and fever.   HENT: Negative for congestion, ear discharge, ear pain, nosebleeds, rhinorrhea, sinus pressure, sore throat and trouble swallowing.    Eyes: Negative for discharge and redness.   Respiratory: Positive for shortness of breath. Negative for apnea, cough, chest tightness and wheezing.    Cardiovascular: Negative for chest pain and near-syncope.   Gastrointestinal: Positive for nausea and vomiting. Negative for abdominal pain and diarrhea.   Endocrine: Negative for polyuria.   Genitourinary: Negative for dysuria, frequency and urgency.   Musculoskeletal: Negative for myalgias and neck pain.   Skin: Negative for color change and rash.   Allergic/Immunologic: Negative for immunocompromised state.   Neurological: Negative for dizziness, seizures, syncope, weakness, light-headedness and headaches.   Hematological: Negative for adenopathy. Does not bruise/bleed easily.   Psychiatric/Behavioral: Negative for behavioral problems and confusion.   All other systems reviewed and are negative.      Past Medical History:   Diagnosis Date   • A-fib    • Bleeding ulcer     approx 9 years ago prior to by pass surgery   • BPH (benign prostatic hyperplasia)    • Coagulopathy     coumadin   • Coronary artery disease     pt relates has been told in past has leaky valve   • Hearing loss    • History of echocardiogram 07/16/2013    flow 14425 (1)    Biatrial enlargement with mild CLVH with normal aortic root size. LV systolic function well preserved with EF of 55-60%. Mitral and AV thickened. Tricuspid valve intact. Diastolic dysfunction.    • Hypertension     since dialysis has placed some of bp meds on hold   • Kidney disease    • Kidney failure    • Macular degeneration    • Macular degeneration    • Nausea & vomiting    • Stroke     mini   • Wears glasses        Allergies   Allergen Reactions   • Flomax [Tamsulosin Hcl]    • Motrin [Ibuprofen]        Past Surgical History:   Procedure Laterality Date   •  ARTERIOVENOUS FISTULA/SHUNT SURGERY Left 5/2/2017    Procedure: LEFT BRACHIAL ARTERY TO AXILLARY VEIN  ARTERIOVENOUS GRAFT     (artegraft);  Surgeon: Jovanni Esparza MD;  Location: [facility] OR;  Service:    • BACK SURGERY     • CAROTID ENDARTERECTOMY     • CORONARY ARTERY BYPASS GRAFT     • EYE SURGERY     • FOREIGN BODY REMOVAL Left     foot   • HERNIA REPAIR      ingunial right and left   • INTERVENTIONAL RADIOLOGY PROCEDURE N/A 3/14/2017    Procedure: tunneled central venous catheter placement;  Surgeon: Jovanni Esparza MD;  Location: [facility] LOCATION;  Service:        Family History   Problem Relation Age of Onset   • Heart disease Father    • Alcohol abuse Son        Social History     Social History   • Marital status:      Spouse name: N/A   • Number of children: N/A   • Years of education: N/A     Social History Main Topics   • Smoking status: Former Smoker     Quit date: 2/5/1970   • Smokeless tobacco: Never Used   • Alcohol use No   • Drug use: No      Comment: prescrition   • Sexual activity: Defer     Other Topics Concern   • None     Social History Narrative           Objective   Physical Exam   Constitutional: He is oriented to person, place, and time. He appears well-developed and well-nourished.   HENT:   Head: Normocephalic and atraumatic.   Nose: Nose normal.   Mouth/Throat: Oropharynx is clear and moist.   Eyes: Conjunctivae and EOM are normal. Pupils are equal, round, and reactive to light. Right eye exhibits no discharge. Left eye exhibits no discharge. No scleral icterus.   Neck: Normal range of motion. Neck supple. No tracheal deviation present.   Cardiovascular: Normal rate, regular rhythm and normal heart sounds.    No murmur heard.  Pulmonary/Chest: Effort normal and breath sounds normal. No stridor. No respiratory distress. He has no wheezes. He has no rales.   Abdominal: Soft. Bowel sounds are normal. He exhibits no distension and no mass. There is no  tenderness. There is no rebound and no guarding.   Musculoskeletal: He exhibits no edema.   Neurological: He is alert and oriented to person, place, and time. Coordination normal.   Skin: Skin is warm and dry. No rash noted. No erythema.   jaundice   Psychiatric: He has a normal mood and affect. His behavior is normal. Thought content normal.   Nursing note and vitals reviewed.      ECG 12 Lead    Date/Time: 6/2/2017 3:11 PM  Performed by: ABILIO RICO  Authorized by: ABILIO RICO   Interpreted by physician  Rhythm: atrial fibrillation  Rate: tachycardic  BPM: 130  ST Segments: ST segments normal                ED Course  ED Course          Labs Reviewed   COMPREHENSIVE METABOLIC PANEL - Abnormal; Notable for the following:        Result Value    BUN 41 (*)     Creatinine 4.44 (*)     Sodium 131 (*)     Chloride 89 (*)     ALT (SGPT) 118 (*)     AST (SGOT) 179 (*)     Total Bilirubin 4.1 (*)     eGFR Non  Amer 13 (*)     Anion Gap 16.0 (*)     All other components within normal limits    Narrative:     The MDRD GFR formula is only valid for adults with stable renal function between ages 18 and 70.   PROTIME-INR - Abnormal; Notable for the following:     Protime >120.0 (*)     INR >18.00 (*)     All other components within normal limits    Narrative:     Therapeutic range for most indications is 2.0-3.0 INR,  or 2.5-3.5 for mechanical heart valves.   CK - Abnormal; Notable for the following:     Creatine Kinase 233 (*)     All other components within normal limits   TROPONIN (IN-HOUSE) - Abnormal; Notable for the following:     Troponin I 0.404 (*)     All other components within normal limits   TSH - Abnormal; Notable for the following:     TSH 6.320 (*)     All other components within normal limits   CBC WITH AUTO DIFFERENTIAL - Abnormal; Notable for the following:     RBC 4.22 (*)     Hemoglobin 13.2 (*)     RDW 23.1 (*)     RDW-SD 77.1 (*)     Platelets 125 (*)     Immature Grans % 0.7 (*)      Immature Grans, Absolute 0.04 (*)     All other components within normal limits   AMYLASE - Normal   LIPASE - Normal   CK MB - Normal   MAGNESIUM - Normal   SCAN SLIDE   URINALYSIS W/ CULTURE IF INDICATED   PROTIME-INR   PROTIME-INR   POCT PROTIME - INR   CBC AND DIFFERENTIAL    Narrative:     The following orders were created for panel order CBC & Differential.  Procedure                               Abnormality         Status                     ---------                               -----------         ------                     Scan Slide[[id number]]                                       Final result               CBC Auto Differential[[id number]]        Abnormal            Final result                 Please view results for these tests on the individual orders.   EXTRA TUBES    Narrative:     The following orders were created for panel order Extra Tubes.  Procedure                               Abnormality         Status                     ---------                               -----------         ------                     Green Top (Gel)[[id number]]                                  Final result                 Please view results for these tests on the individual orders.   GREEN TOP       XR Chest 1 View   Final Result   CONCLUSION:          1. No evidence of an acute cardiopulmonary process.                           Electronically signed by:  SAROJ Ortega MD  6/2/2017 12:52   PM CDT Workstation: [facility]    Final diagnoses:   Coumadin toxicity, accidental or unintentional, initial encounter   Hyperbilirubinemia   NSTEMI (non-ST elevated myocardial infarction)            Chivo Fernandez MD  06/02/17 1512       Electronically signed by Chivo Fernandez MD at 6/2/2017  3:12 PM      Sully Diaz RN at 6/2/2017  1:56 PM          Spoke with Radha from dialysis who states pts. Will be last on her list today but she called to verify labs had been ordered. Also  spoke with Dr. Fernandez who is in agreeance with waiting for urine anaylsis due to PT and INR elevation and pt. Does not produce much urine.      Sully Diaz RN  06/02/17 1357       Electronically signed by Sully Diaz RN at 6/2/2017  1:57 PM      Sully Diaz RN at 6/2/2017  2:05 PM          Spoke with Radha from Dialysis who states that she spoke with Dr. Holguin and after reviewing pts. Lab results it has been decided that pt. Will received Dialysis first thing in the morning.       Sully Diaz RN  06/02/17 1418       Sully Diaz RN  06/02/17 1419       Electronically signed by Sully Diaz RN at 6/2/2017  2:19 PM      Sully Diaz RN at 6/2/2017  2:19 PM          Pt. Assisted in bed for comfort at this time.  Resps even and unlabored.  VSS.  NAD.  Pt. Alert and oriented x4.  No other needs voiced at this time will continue to monitor.      Sully Diaz RN  06/02/17 1420       Electronically signed by Sully Diaz RN at 6/2/2017  2:20 PM      Hamida Osorio at 6/2/2017  3:33 PM          Rm 311 assigned at 1513 Tel Box called for at 1531     Hamida Osorio  06/02/17 1533       Electronically signed by Hamida Osorio at 6/2/2017  3:33 PM      Sully Diaz RN at 6/2/2017  4:10 PM          Pharmacy to send medications prior to pt. Leaving ED.  Spoke with pharmacy tech who states she is checking the medication then will send them     Sully Diaz RN  06/02/17 1610       Electronically signed by Sully Diaz RN at 6/2/2017  4:10 PM      Sully Diaz RN at 6/2/2017  6:04 PM          Report given to Rosalva STOVALL on floor who states no additional questions at this time. Resps even and unlabored.  Pt. Remains tachycardiac at a rate of 126bpm.  Vitamin K continued to floor and RN made aware of the need for Kcentra to infuse afterward.  No other needs voiced at this time. Pt. Ready for transport.      Sully Diaz RN  06/02/17 8436       Electronically  signed by Sully Diaz RN at 6/2/2017  6:05 PM        Hospital Medications (active)       Dose Frequency Start End    acetaminophen-codeine (TYLENOL #3) 300-30 MG per tablet 1 tablet 1 tablet Every 4 Hours PRN 6/4/2017 6/14/2017    Sig - Route: Take 1 tablet by mouth Every 4 (Four) Hours As Needed for Moderate Pain (4-6). - Oral    albumin human 25 % IV SOLN 25 g 25 g As Needed 6/3/2017 6/4/2017    Sig - Route: Infuse 100 mL into a venous catheter As Needed (Hypotension During Dialysis). - Intravenous    Notes to Pharmacy: Please put access in dialysis ADS. Thank you    azithromycin (ZITHROMAX) tablet 250 mg 250 mg Every 24 Hours 6/4/2017     Sig - Route: Take 1 tablet by mouth Daily. - Oral    carvedilol (COREG) tablet 12.5 mg 12.5 mg 2 Times Daily With Meals 6/3/2017     Sig - Route: Take 1 tablet by mouth 2 (Two) Times a Day With Meals. - Oral    Cosign for Ordering: Accepted by Aime Moreira MD on 6/3/2017  4:28 PM    cefTRIAXone (ROCEPHIN) 1 g/100 mL 0.9% NS (MBP) 1 g Every 24 Hours 6/3/2017     Sig - Route: Infuse 100 mL into a venous catheter Daily. - Intravenous    colchicine tablet 0.6 mg 0.6 mg Daily 6/3/2017     Sig - Route: Take 1 tablet by mouth Daily. - Oral    Cosign for Ordering: Accepted by Aime Moreira MD on 6/3/2017  4:28 PM    diltiaZEM CD (CARDIZEM CD) 24 hr capsule 120 mg 120 mg Daily 6/3/2017     Sig - Route: Take 1 capsule by mouth Daily. - Oral    Cosign for Ordering: Accepted by Aime Moreira MD on 6/3/2017  4:28 PM    heparin (porcine) injection 4,100 Units 4,100 Units Daily PRN 6/3/2017     Sig - Route: 4.1 mL by Intracatheter route Daily As Needed (For Vascath packing after HD tx.). - Intracatheter    isosorbide mononitrate (IMDUR) 24 hr tablet 30 mg 30 mg Daily 6/3/2017     Sig - Route: Take 1 tablet by mouth Daily. - Oral    Cosign for Ordering: Accepted by Aime Moreira MD on 6/3/2017  4:28 PM    magic butt ointment  2 Times Daily 6/3/2017     Sig  - Route: Apply  topically 2 (Two) Times a Day. - Topical    Cosign for Ordering: Accepted by Aime Moreira MD on 6/3/2017  4:28 PM    Morphine sulfate (PF) injection 2 mg 2 mg Every 4 Hours PRN 6/5/2017 6/15/2017    Sig - Route: Infuse 1 mL into a venous catheter Every 4 (Four) Hours As Needed for Severe Pain (7-10). - Intravenous    ranolazine (RANEXA) 12 hr tablet 500 mg 500 mg 2 Times Daily 6/3/2017     Sig - Route: Take 1 tablet by mouth 2 (Two) Times a Day. - Oral    Cosign for Ordering: Accepted by Aime Moreira MD on 6/3/2017  4:28 PM    sodium chloride 0.9 % flush 1-10 mL 1-10 mL As Needed 6/2/2017     Sig - Route: Infuse 1-10 mL into a venous catheter As Needed for Line Care. - Intravenous    Cosign for Ordering: Accepted by Aime Moreira MD on 6/3/2017 12:22 AM          Orders (last 72 hrs)     Start     Ordered    06/05/17 1108  Discontinue IV  Once      06/05/17 1112    06/05/17 1107  Discharge patient  Once      06/05/17 1112    06/05/17 0800  Morphine sulfate (PF) injection 2 mg  Every 4 Hours PRN      06/05/17 0800    06/05/17 0600  CBC Auto Differential  PROCEDURE ONCE,   Status:  Canceled      06/05/17 0001    06/05/17 0000  Activity as Tolerated      06/05/17 1112    06/05/17 0000  Diet: Regular; Thin Liquids, No Restrictions      06/05/17 1112    06/05/17 0000  acetaminophen (TYLENOL) 650 MG suppository  Every 4 Hours PRN      06/05/17 1112    06/05/17 0000  LORazepam (ATIVAN) 1 MG tablet  Every 6 Hours PRN      06/05/17 1112    06/05/17 0000  Morphine 20 MG/ML concentrated solution  Every 4 Hours PRN      06/05/17 1112    06/05/17 0000  prochlorperazine (COMPAZINE) 25 MG suppository  Every 12 Hours PRN      06/05/17 1112    06/04/17 2100  azithromycin (ZITHROMAX) tablet 250 mg  Every 24 Hours      06/03/17 2115 06/04/17 1530  acetaminophen-codeine (TYLENOL #3) 300-30 MG per tablet 1 tablet  Every 4 Hours PRN      06/04/17 1531    06/04/17 1226  Comfort Measures, Allow  Natural Death  Continuous      06/04/17 1225    06/04/17 1221  Wound Ostomy Eval & Treat  Once      06/04/17 1220    06/04/17 1125  Conditional Code  Continuous,   Status:  Canceled      06/04/17 1125    06/04/17 0937  Inpatient Consult to Case Management   Once     Provider:  (Not yet assigned)    06/04/17 0936    06/04/17 0832  Scan Slide  Once      06/04/17 0831    06/04/17 0600  CBC Auto Differential  PROCEDURE ONCE      06/04/17 0001    06/04/17 0045  sodium chloride 0.9 % bolus 250 mL  Once      06/04/17 0003    06/04/17 0030  albumin human 25 % IV SOLN 12.5 g  Once      06/03/17 2355    06/03/17 2200  Blood Culture  Once      06/03/17 2159    06/03/17 2200  Blood Culture  Once     Comments:  From a different site than #1.    06/03/17 2159    06/03/17 2200  Respiratory Culture  Once      06/03/17 2159    06/03/17 2200  Urinalysis With / Microscopic If Indicated  Once      06/03/17 2159    06/03/17 2200  Urine Culture  Once      06/03/17 2159    06/03/17 2145  azithromycin (ZITHROMAX) tablet 500 mg  Once      06/03/17 2115    06/03/17 2145  cefTRIAXone (ROCEPHIN) 1 g/100 mL 0.9% NS (MBP)  Every 24 Hours      06/03/17 2115    06/03/17 1800  Dietary Nutrition Supplements Nepro  2 Times Daily      06/03/17 1056    06/03/17 1800  magic butt ointment  2 Times Daily      06/03/17 1514    06/03/17 1730  magnesium sulfate in D5W 1g/100mL (PREMIX)  Once      06/03/17 1658    06/03/17 1709  XR Chest 1 View  1 Time Imaging      06/03/17 1709    06/03/17 1702  Lactate Acid, Reflex  Timed      06/03/17 1401    06/03/17 1445  albumin human 5 % bottle 12.5 g  Once,   Status:  Discontinued      06/03/17 1404    06/03/17 1445  albumin human 25 % IV SOLN 12.5 g  Once      06/03/17 1408    06/03/17 1406  C-reactive Protein  Once      06/03/17 1405    06/03/17 1136  US Abdomen Limited  1 Time Imaging      06/02/17 1908    06/03/17 0900  isosorbide mononitrate (IMDUR) 24 hr tablet 30 mg  Daily      06/03/17 0821     06/03/17 0900  ranolazine (RANEXA) 12 hr tablet 500 mg  2 Times Daily      06/03/17 0821    06/03/17 0900  carvedilol (COREG) tablet 12.5 mg  2 Times Daily With Meals      06/03/17 0821    06/03/17 0900  diltiaZEM CD (CARDIZEM CD) 24 hr capsule 120 mg  Daily      06/03/17 0821    06/03/17 0900  colchicine tablet 0.6 mg  Daily      06/03/17 0821    06/03/17 0854  Blood Gas, Arterial  STAT      06/03/17 0854    06/03/17 0854  Lactic Acid, Plasma  Once      06/03/17 0854    06/03/17 0820  acetaminophen-codeine (TYLENOL #3) 300-30 MG per tablet 1 tablet  Every 6 Hours PRN,   Status:  Discontinued      06/03/17 0821    06/03/17 0813  heparin (porcine) injection 4,100 Units  Daily PRN      06/03/17 0814    06/03/17 0634  albumin human 25 % IV SOLN 25 g  As Needed     Comments:  Please put access in dialysis ADS. Thank you    06/03/17 0634    06/03/17 0631  Hemodialysis Inpatient  Once      06/03/17 0634    06/03/17 0601  Scan Slide  Once,   Status:  Canceled      06/03/17 0600    06/03/17 0600  CBC & Differential  Daily      06/02/17 1908    06/03/17 0600  Comprehensive Metabolic Panel  Daily      06/02/17 1908    06/03/17 0600  Cancer Antigen 19-9  Morning Draw      06/02/17 2109    06/03/17 0600  CEA  Morning Draw      06/02/17 2109    06/03/17 0600  CBC Auto Differential  PROCEDURE ONCE      06/03/17 0001    06/03/17 0554  Extra Tubes  Once      06/03/17 0553    06/03/17 0554  Gold Top - SST  PROCEDURE ONCE      06/03/17 0553    06/03/17 0554  Green Top (Gel)  PROCEDURE ONCE      06/03/17 0553    06/03/17 0000  Protime-INR  Once     Comments:  Recheck PT/INR in 24 hours    06/02/17 1506    06/03/17 0000  Troponin  Every 6 Hours,   Status:  Canceled     Comments:  Please draw 6 hours from last draw in the ED.    06/02/17 1908    06/03/17 0000  MRI abdomen wo contrast mrcp  1 Time Imaging      06/02/17 1908    06/02/17 2209  Wound Ostomy Eval & Treat  Once     Comments:  Appears to be possible Stage 2    06/02/17 5856     06/02/17 2208  Inpatient Consult to Nutrition  Once     Provider:  (Not yet assigned)    06/02/17 2210    06/02/17 2000  Vital Signs  Every 4 Hours      06/02/17 1908 06/02/17 2000  Strict Intake and Output  Every Hour      06/02/17 1908 06/02/17 1909  Weigh Patient  Once      06/02/17 1908    06/02/17 1909  Oxygen Therapy-  Continuous      06/02/17 1908    06/02/17 1909  Insert Peripheral IV  Once      06/02/17 1908    06/02/17 1909  Saline Lock & Maintain IV Access  Continuous      06/02/17 1908    06/02/17 1909  Conditional Code  Continuous,   Status:  Canceled      06/02/17 1908    06/02/17 1909  VTE Risk Assessment - Low Risk  Once      06/02/17 1908    06/02/17 1909  Pharmacologic VTE Prophylaxis Not Indicated: Currently Anticoagulated  Once      06/02/17 1908    06/02/17 1909  Place Compression Stockings (TEDs)  Once      06/02/17 1908    06/02/17 1909  Remove & Replace Compression Stockings (TEDS) Daily  Daily      06/02/17 1908    06/02/17 1909  Place Sequential Compression Device  Once      06/02/17 1908    06/02/17 1909  Maintain Sequential Compression Device  Continuous      06/02/17 1908    06/02/17 1909  Diet Regular; Renal  Diet Effective Now      06/02/17 1908    06/02/17 1909  Inpatient Consult to Cardiology  Once     Specialty:  Cardiology  Provider:  Geronimo Jenkins MD    06/02/17 1908    06/02/17 1909  Inpatient Consult to Gastroenterology  Once     Specialty:  Gastroenterology  Provider:  Alvaro Hoang DO    06/02/17 1908    06/02/17 1909  Inpatient Consult to Nephrology  Once     Specialty:  Nephrology  Provider:  Ezio Sweeney MD    06/02/17 1908    06/02/17 1909  Protime-INR  Daily      06/02/17 1908    06/02/17 1909  US Gallbladder  1 Time Imaging,   Status:  Canceled      06/02/17 1908    06/02/17 1909  Cardiac Monitoring  Continuous      06/02/17 1908    06/02/17 1908  sodium chloride 0.9 % flush 1-10 mL  As Needed      06/02/17 1908    06/02/17 1905  Bladder Scan  Once       06/02/17 1904    06/02/17 1806  Bladder Scan  Once,   Status:  Canceled      06/02/17 1805    06/02/17 1600  prothrombin complex conc human (KCentra) IV solution 3,826 Units  Once      06/02/17 1506    06/02/17 1508  phytonadione (AQUA-MEPHYTON, VITAMIN K) 10 mg in sodium chloride 0.9 % 50 mL IVPB  Once      06/02/17 1506    06/02/17 1224  sodium chloride 0.9 % infusion  Continuous,   Status:  Discontinued      06/02/17 1222    --  SCANNED EKG      06/02/17 0000    Signed and Held  isosorbide mononitrate (IMDUR) 24 hr tablet 30 mg  Daily,   Status:  Canceled      Signed and Held    Signed and Held  ranolazine (RANEXA) 12 hr tablet 500 mg  2 Times Daily,   Status:  Canceled      Signed and Held    Signed and Held  acetaminophen-codeine (TYLENOL #3) 300-30 MG per tablet 1 tablet  Every 6 Hours PRN,   Status:  Canceled      Signed and Held    Signed and Held  colchicine tablet 0.6 mg  Daily,   Status:  Canceled      Signed and Held    Signed and Held  carvedilol (COREG) tablet 12.5 mg  2 Times Daily With Meals,   Status:  Canceled      Signed and Held    Signed and Held  diltiaZEM CD (CARDIZEM CD) 24 hr capsule 120 mg  Daily,   Status:  Canceled      Signed and Held    Signed and Held  sucralfate (CARAFATE) tablet 1 g  3 Times Daily,   Status:  Canceled      Signed and Held    --  SCANNED - TELEMETRY        06/02/17 0000    --  SCANNED - TELEMETRY        06/02/17 0000    --  SCANNED - TELEMETRY        06/02/17 0000    --  SCANNED EKG      06/02/17 0000    --  SCANNED - TELEMETRY        06/02/17 0000    --  SCANNED - TELEMETRY        06/02/17 0000    --  SCANNED - TELEMETRY        06/02/17 0000    --  SCANNED - TELEMETRY        06/02/17 0000    --  SCANNED - TELEMETRY        06/02/17 0000    --  SCANNED - TELEMETRY        06/02/17 0000    --  SCANNED - TELEMETRY        06/02/17 0000        Pending ref#[id number]  Jacquie Funes RN [facility]  [phone]  Fax [phone]

## 2017-06-05 NOTE — PROGRESS NOTES
FAMILY MEDICINE DAILY PROGRESS NOTE  NAME: Rutherford Heber Middleton  : 1933  MRN: [id number]      LOS: 3 days     PROVIDER OF SERVICE: Paulina Samuel MD    Chief Complaint: Pneumonia due to infectious organism    Subjective:     Interval History:  History taken from: patient RN Per the nurse the patient refused all medications other than tylenol 3 and blood work has been stopped.  Patient Complaints: denies any complaints  Patient Denies:  Any pain, nausea, diarrhea, constipation, chest pain or cough.     Review of Systems:   Review of Systems   Constitutional: Positive for appetite change. Negative for diaphoresis, fatigue and fever.   HENT: Negative for congestion, postnasal drip, rhinorrhea, sinus pressure, sneezing, sore throat, tinnitus and voice change.    Eyes: Negative for pain, redness and itching.   Respiratory: Negative for apnea, cough and wheezing.    Gastrointestinal: Negative for abdominal pain, constipation, diarrhea, nausea and vomiting.   Genitourinary: Negative for dysuria and hematuria.   Musculoskeletal: Negative for back pain and neck pain.   Skin: Positive for wound. Negative for rash.       Objective:     Vital Signs  Temp:  [97 °F (36.1 °C)] 97 °F (36.1 °C)  Heart Rate:  [103-131] 112  Resp:  [18] 18  BP: (98)/(71) 98/71  Body mass index is 18.87 kg/(m^2).    Physical Exam  Physical Exam   Constitutional: He appears cachectic.   HENT:   Head: Normocephalic and atraumatic.   Cardiovascular: tachycardia with irregularly irregular rhythm and intact distal pulses.  Exam reveals no gallop and no friction rub.    No murmur heard.  Pulmonary/Chest: Effort normal and decreased breath sounds. No respiratory distress. He has no wheezes. He has no rales. He exhibits no tenderness.   Abdominal: Soft. Bowel sounds are normal. He exhibits no distension and no mass. There is no tenderness. There is no rebound and no guarding. No hernia.   Neurological: He is alert.   Extremities: Decreased  b/l lower extremity swelling, decreased erythema of toes, R hand colder than left hand        Medication Review    Current Facility-Administered Medications:   •  acetaminophen-codeine (TYLENOL #3) 300-30 MG per tablet 1 tablet, 1 tablet, Oral, Q4H PRN, Aime Moreira MD, 1 tablet at 06/05/17 0356  •  azithromycin (ZITHROMAX) tablet 250 mg, 250 mg, Oral, Q24H, Aime Moreira MD  •  carvedilol (COREG) tablet 12.5 mg, 12.5 mg, Oral, BID With Meals, Paulina Samuel MD, 12.5 mg at 06/03/17 1811  •  cefTRIAXone (ROCEPHIN) 1 g/100 mL 0.9% NS (MBP), 1 g, Intravenous, Q24H, Aime Moreira MD, 1 g at 06/03/17 2133  •  colchicine tablet 0.6 mg, 0.6 mg, Oral, Daily, Paulina Samuel MD, 0.6 mg at 06/04/17 1002  •  diltiaZEM CD (CARDIZEM CD) 24 hr capsule 120 mg, 120 mg, Oral, Daily, Paulina Samuel MD, 120 mg at 06/04/17 1002  •  heparin (porcine) injection 4,100 Units, 4,100 Units, Intracatheter, Daily PRN, Ezio MD Zahra, 4,100 Units at 06/03/17 1040  •  isosorbide mononitrate (IMDUR) 24 hr tablet 30 mg, 30 mg, Oral, Daily, Paulina Samuel MD, 30 mg at 06/03/17 1251  •  magic butt ointment, , Topical, BID, Paulina Samuel MD  •  ranolazine (RANEXA) 12 hr tablet 500 mg, 500 mg, Oral, BID, Paulina Samuel MD, 500 mg at 06/03/17 1811  •  sodium chloride 0.9 % flush 1-10 mL, 1-10 mL, Intravenous, PRN, Paulina Samuel MD     Diagnostic Data    Lab Results (last 24 hours)     Procedure Component Value Units Date/Time    Comprehensive Metabolic Panel [[id number]]  (Abnormal) Collected:  06/04/17 0716    Specimen:  Blood Updated:  06/04/17 0844     Glucose 81 mg/dL      BUN 44 (H) mg/dL      Creatinine 4.54 (H) mg/dL      Sodium 131 (L) mmol/L      Potassium 4.2 mmol/L      Chloride 90 (L) mmol/L      CO2 22.0 mmol/L      Calcium 8.7 mg/dL      Total Protein 6.0 (L) g/dL      Albumin 3.40 g/dL      ALT (SGPT) 113 (H) U/L      AST (SGOT) 166 (H) U/L       Alkaline Phosphatase 90 U/L      Total Bilirubin 6.3 (H) mg/dL      eGFR Non African Amer 12 (L) mL/min/1.73      Globulin 2.6 gm/dL      A/G Ratio 1.3 g/dL      BUN/Creatinine Ratio 9.7     Anion Gap 19.0 (H) mmol/L     Narrative:       The MDRD GFR formula is only valid for adults with stable renal function between ages 18 and 70.    Protime-INR [[id number]]  (Abnormal) Collected:  06/04/17 0716    Specimen:  Blood Updated:  06/04/17 0848     Protime 16.3 (H) Seconds      INR 1.31 (H)    Narrative:       Therapeutic range for most indications is 2.0-3.0 INR,  or 2.5-3.5 for mechanical heart valves.    Troponin [[id number]]  (Abnormal) Collected:  06/04/17 0716    Specimen:  Blood Updated:  06/04/17 0857     Troponin I 0.328 (C) ng/mL     CBC Auto Differential [[id number]]  (Abnormal) Collected:  06/04/17 0716    Specimen:  Blood Updated:  06/04/17 0909     WBC 3.33 10*3/mm3      RBC 3.92 (L) 10*6/mm3      Hemoglobin 12.2 (L) g/dL      Hematocrit 37.7 (L) %      MCV 96.2 fL      MCH 31.1 pg      MCHC 32.4 g/dL      RDW 22.6 (H) %      RDW-SD 75.3 (H) fl      MPV -- fL       INSTRUMENT UNABLE TO CALCULATE RESULTS        Platelets 95 (L) 10*3/mm3       SPECIMEN REANALYZED TO CONFIRM RESULTS        Neutrophil % 79.0 %      Lymphocyte % 9.3 (L) %      Monocyte % 9.6 %      Eosinophil % 0.0 %      Basophil % 0.9 %      Immature Grans % 1.2 (H) %      Neutrophils, Absolute 2.63 10*3/mm3      Lymphocytes, Absolute 0.31 (L) 10*3/mm3      Monocytes, Absolute 0.32 10*3/mm3      Eosinophils, Absolute 0.00 10*3/mm3      Basophils, Absolute 0.03 10*3/mm3      Immature Grans, Absolute 0.04 (H) 10*3/mm3     CBC & Differential [[id number]] Collected:  06/04/17 0716    Specimen:  Blood Updated:  06/04/17 1030    Narrative:       The following orders were created for panel order CBC & Differential.  Procedure                               Abnormality         Status                     ---------                                -----------         ------                     Scan Slide[[id number]]                                       Final result               CBC Auto Differential[[id number]]        Abnormal            Final result                 Please view results for these tests on the individual orders.    Scan Slide [[id number]] Collected:  06/04/17 0716    Specimen:  Blood Updated:  06/04/17 1030     Anisocytosis Mod/2+     Dacrocytes --      FEW TEARDROP CELLS        Macrocytes Slight/1+     Polychromasia Slight/1+     WBC Morphology Normal     Platelet Estimate Decreased    Troponin [[id number]]  (Abnormal) Collected:  06/04/17 1232    Specimen:  Blood Updated:  06/04/17 1314     Troponin I 0.297 (C) ng/mL     Troponin [[id number]]  (Abnormal) Collected:  06/04/17 1745    Specimen:  Blood Updated:  06/04/17 1820     Troponin I 0.326 (C) ng/mL     Blood Culture [[id number]]  (Normal) Collected:  06/03/17 2232    Specimen:  Blood from Arm, Right Updated:  06/04/17 2302     Blood Culture No growth at 24 hours    Blood Culture [[id number]]  (Normal) Collected:  06/03/17 2243    Specimen:  Blood from Hand, Right Updated:  06/04/17 2302     Blood Culture No growth at 24 hours            I reviewed the patient's new clinical results.    Assessment/Plan:     Active Hospital Problems (** Indicates Principal Problem)    Diagnosis Date Noted   • **Pneumonia due to infectious organism [J18.9] 06/04/2017     All medications have been stopped and patient has agreed to go home with hospice this afternoon.      • Elevated liver enzymes [R74.8] 06/02/2017     -f/u RUQ U/S  -Dr. Hoang consulted, agreed to see patient  -MRCP tomorrow morning       • Cholelithiases [K80.20] 06/02/2017     -seen on CT 2 weeks ago  -RUQ ultrasound showes cholelithiasis  -MRCP shows no stones in common bile duct       • Chronic atrial fibrillation [I48.2] 06/02/2017     Patient has declined medication as he is now going home with hospice     • ESRD (end stage renal disease)  on dialysis [N18.6, Z99.2] 06/02/2017     Dialysis has been stopped for hospice at patient's request     • PAF (paroxysmal atrial fibrillation) [I48.0] 03/09/2017     Continue home medications  Will hold imdur and coreg due to hypotension        Resolved Hospital Problems    Diagnosis Date Noted Date Resolved   • Coumadin toxicity [T45.511A] 06/02/2017 06/04/2017     -Will Hold coumadin  -Vitamin K protocol  -Daily INR  -INR on admission >18         DVT prophylaxis: SCD/RAYRAY  Code status is Comfort Measures and Allow Natural Death (A-N-D)    Plan for disposition:to be discharged home on hospice today      Time: 30 minutes         This document has been electronically signed by Paulina Samuel MD on June 5, 2017 7:49 AM

## 2017-06-05 NOTE — NURSING NOTE
Patient has a stage 2 pressure injury with shearing also noted. Wound measures 1 x 2 x 0.1cm. Patient has Magic Butt ordered. POA Yes.

## 2017-06-07 LAB — CEA SERPL-MCNC: 17.2 NG/ML (ref 0–5)

## 2017-06-07 NOTE — PAYOR COMM NOTE
"Sarita Middleton (Dcsd. Male)     Date of Birth Social Security Number Address Home Phone MRN    1933  [address]  [place] [address] [phone] [id number]    Tenriism Marital Status          None        Admission Date Admission Type Admitting Provider Attending Provider Department, Room/Bed    17 Home Traylor DO  [address], 311/1    Discharge Date Discharge Disposition Discharge Destination        2017 Hospice/Home             Attending Provider: (none)    Allergies:  Flomax [Tamsulosin Hcl], Motrin [Ibuprofen]    Isolation:  None   Infection:  None   Code Status:  Prior    Ht:  74\" (188 cm)   Wt:  147 lb (66.7 kg)    Admission Cmt:  None   Principal Problem:  Pneumonia due to infectious organism [J18.9] More...                 Active Insurance as of 2017     Primary Coverage     Payor Plan Insurance Group Employer/Plan Group    HUMANA MEDICARE REPLACEMENT HUMANA MEDICARE REPL [id number]     Payor Plan Address Payor Plan Phone Number Effective From Effective To    [address] [address] [phone] 2016     [place] [address]       Subscriber Name Subscriber Birth Date Member ID       SARITA MIDDLETON 1933 [id number]                 Emergency Contacts      (Rel.) Home Phone Work Phone Mobile Phone    Madai Middleton (Spouse) [phone] -- [phone]               Discharge Summary      Home Ruben Hernandez DO at 2017 11:12 AM              DISCHARGE SUMMARY    NAME: Westbrook Heber Middleton   PHYSICIAN: Paulina Samuel MD  : 1933  MRN: [id number]    ADMITTED: 2017   DISCHARGED: 17    ADMISSION DIAGNOSES:  Active Hospital Problems (** Indicates Principal Problem)    Diagnosis Date Noted   • **Pneumonia due to infectious organism [J18.9] 2017   • Elevated liver enzymes [R74.8] 2017   • Cholelithiases [K80.20] 2017   • Chronic atrial fibrillation [I48.2] " 06/02/2017   • ESRD (end stage renal disease) on dialysis [N18.6, Z99.2] 06/02/2017   • PAF (paroxysmal atrial fibrillation) [I48.0] 03/09/2017      Resolved Hospital Problems    Diagnosis Date Noted Date Resolved   • Coumadin toxicity [T45.511A] 06/02/2017 06/04/2017     DISCHARGE DIAGNOSES:   Active Hospital Problems (** Indicates Principal Problem)    Diagnosis Date Noted   • **Pneumonia due to infectious organism [J18.9] 06/04/2017   • Elevated liver enzymes [R74.8] 06/02/2017   • Cholelithiases [K80.20] 06/02/2017   • Chronic atrial fibrillation [I48.2] 06/02/2017   • ESRD (end stage renal disease) on dialysis [N18.6, Z99.2] 06/02/2017   • PAF (paroxysmal atrial fibrillation) [I48.0] 03/09/2017      Resolved Hospital Problems    Diagnosis Date Noted Date Resolved   • Coumadin toxicity [T45.511A] 06/02/2017 06/04/2017       SERVICE: Family Medicine. Attending Dr. Hernandez, Resident Paulina Samuel MD    CONSULTS:   Consult Orders (all)     Start     Ordered    06/04/17 0937  Inpatient Consult to Case Management   Once     Provider:  (Not yet assigned)    06/04/17 0936    06/02/17 2208  Inpatient Consult to Nutrition  Once     Provider:  (Not yet assigned)    06/02/17 2210    06/02/17 1909  Inpatient Consult to Cardiology  Once     Specialty:  Cardiology  Provider:  Geronimo Jenkins MD    06/02/17 1908 06/02/17 1909  Inpatient Consult to Gastroenterology  Once     Specialty:  Gastroenterology  Provider:  Alvaro Hoang DO    06/02/17 1908 06/02/17 1909  Inpatient Consult to Nephrology  Once     Specialty:  Nephrology  Provider:  Ezio Sweeney MD    06/02/17 1908 06/02/17 1510  Family Practice - Resident (on-call MD unless specified)  Once     Specialty:  Family Medicine  Provider:  Aime Moreira MD    06/02/17 1510          PROCEDURES:   Ct Abdomen Pelvis Without Contrast    Result Date: 5/16/2017  EXAMINATION:  Computed Tomography         REGION:    Abdomen / Pelvis                  INDICATION:  Nausea/vomiting HISTORY: LOAN. IMAGING:    CT A/P 12/22/09 (report)        TECHNIQUE:     - reconstructions:    axial, coronal, sagittal     - contrast:      oral:  Yes ;   intravenous:  no    - Please note:       - Lack of IV contrast limits assessment of solid organ parenchyma, urinary system, or vascular structures. This exam was performed according to the departmental dose-optimization program which includes automated exposure control, adjustment of the mA and/or kV according to patient size and/or use of iterative reconstruction technique.     COMMENTS: THORAX (INFERIOR): The lung bases are clear.   There are moderate-sized bilateral pleural fluid collections. The heart size is normal size and there is no pericardial fluid. ABDOMEN: Limited assessment of the solid organ parenchyma is grossly unremarkable demonstrating no evidence of organomegaly.  Cholelithiasis without gross evidence of wall thickening. Limited assessment of the viscera is grossly unremarkable demonstrating normal caliber bowel loops.  No evidence of free fluid or free intraperitoneal air.   The osseous structures are grossly unremarkable for age. RETROPERITONEUM: Unenhanced assessment of the kidneys: Right: displays an attenuated right kidney with a 1.1 cm calculus in the central collecting system possibly representing an early/small staghorn calculus. A 1.8 cm focal hypoattenuating defect is noted in the interpolar region. Left: Native renal tissue is not visualized. There is a 12.6 cm hypoattenuating lesion in the region of the left renal bed, presumably of renal etiology. Additional septated hypoattenuating lesions are noted more superiorly.. Limited assessment of the ureters demonstrates normal caliber and course.  The adrenal glands are of normal size and contour.   No gross evidence of significant retroperitoneal adenopathy. The vascular structures are grossly within normal limits for age. PELVIS: Limited  assessment of the viscera is grossly unremarkable demonstrating normal caliber bowel loops.   No evidence of free fluid or free intraperitoneal air.   The osseous structures are grossly unremarkable for age.   The vascular structures are grossly within normal limits for age. Markedly enlarged prostate.       .          CONCLUSION:   1. Moderate-sized bilateral pleural fluid collections with associated extrinsic compressive atelectasis. 2. Diminished right renal size, and native left renal parenchyma is not visualized. Multiple left-sided hypoattenuating lesions as described above, presumably of left renal origin. 3. Cholelithiasis.                       Electronically signed by:  SAROJ Ortega MD  5/16/2017 2:40 PM CDT Workstation: [email or web address]    Xr Chest 1 View    Result Date: 6/3/2017  PROCEDURE: XR CHEST 1 VIEW INDICATION FOR PROCEDURE:  83 years -old patient presents for evaluation of shortness of breath. COMPARISON:  June 2, 2017. FINDINGS: XR CHEST 1 view  reveals the lungs are expanded. Right-sided dialysis catheter is visible within the subclavian vein and the tips of the catheters are in the right atrium. Cardiac monitoring leads are present. Patient has had a sternotomy. There is no radiographic evidence for pneumothorax. The cardiac silhouette is mildly enlarged. Mediastinal silhouette is within normal limits. There are vascular calcifications of the thoracic aorta. There is bilateral basilar airspace consolidation and atelectasis. There are pleural effusions. The skeletal structures are within normal limits.     1.  Cardiomegaly with mild pulmonary congestion. 2.  Bilateral pleural effusions and possible bilateral basilar airspace disease and/or atelectasis. Electronically signed by:  Tori Arteaga MD  6/3/2017 7:09 PM CDT Workstation: [facility]    Xr Chest 1 View    Result Date: 6/2/2017  EXAM:         Radiograph(s), Chest VIEWS:   Portable ; 1     DATE/TIME: 6/2/2017 12:49 PM CDT            INDICATION:     Weakness    COMPARISON:   CXR: 3/13/17      FINDINGS:       - lines/tubes:     Right approach large caliber central venous catheter in standard position.   - cardiac:          size within normal limits       - mediastinum:  contour within normal limits       - lungs:          no focal air space process, pulmonary interstitial edema, nodule(s)/mass           - pleura:          Tiny left pleural fluid collection of doubtful clinical significance.                - osseous:          Status post CABG                - misc.:       CONCLUSION:    1. No evidence of an acute cardiopulmonary process.            Electronically signed by:  SAROJ Ortega MD  6/2/2017 12:52 PM CDT Workstation: [email or web address]    Mri Abdomen Wo Contrast Mrcp    Result Date: 6/4/2017  Patient Name:  MR. SARITA SMITH Patient ID:  [id number] Ordering:  DANIS ARRIETA Attending:  DANIS ARRIETA Referring:  DANIS ARRIETA ------------------------------------------------ MRI of the abdomen without contrast and including MRCP HISTORY: Transaminitis. Cholelithiasis. Poisoning by anticoagulants. Multisequence multiplanar images of the abdomen were obtained without contrast. MRCP performed including 3-D reconstructions. COMPARISON: None. Correlation with CT May 16, 2017 and ultrasound Nena 3, 2017. Minimal cardiomegaly. Moderate-sized right pleural effusion with associated compressive atelectasis right lower lobe. Small left pleural effusion. Cholelithiasis. No choledocholithiasis. No intrahepatic or extrahepatic biliary dilatation. No stricture of the common bile duct. No pancreatic mass. Unremarkable liver and spleen. No adrenal mass. 1 cm calculus right renal pelvis. Minimal right hydronephrosis with peripelvic stranding. Small right renal cysts. Left kidney entirely replaced with cysts, largest 10.5 cm. No abdominal aortic aneurysm. No adenopathy. Very minimal free fluid anterior to the liver. Dependent subcutaneous  edema. No bowel obstruction.     CONCLUSION: Minimal cardiomegaly. Moderate-sized right pleural effusion with associated compressive atelectasis right lower lobe. Small left pleural effusion. Cholelithiasis. No choledocholithiasis. No intrahepatic or extrahepatic biliary dilatation. No stricture of the common bile duct. 1 cm calculus right renal pelvis. Minimal right hydronephrosis with peripelvic stranding. Small right renal cysts. Left kidney entirely replaced with cysts, largest 10.5 cm. Very minimal free fluid anterior to the liver. Dependent subcutaneous edema. [id number] Electronically signed by:  Abisai Arteaga MD  6/4/2017 8:40 AM CDT Workstation: [employer]    Us Abdomen Limited    Result Date: 6/3/2017  DATE OF EXAM: 6/3/2017 11:06 AM CDT PROCEDURE: US ABDOMEN LIMITED INDICATION FOR PROCEDURE: 83 years old patient presents for initial evaluation of transaminitis. TECHNIQUE: Multiple static grayscale images are obtained transabdominally. COMPARISON:  Renal ultrasound dated March 6, 2017 and CT the abdomen and pelvis dated May 16, 2017. FINDINGS: Images of the liver reveal normal homogeneous echogenicity. No masses are identified..  There are no dilated intrahepatic biliary ducts. Color Doppler documents hepatopedal blood flow within the portal vein. The gallbladder is present. Multiple gallstones are visible. There is no pericholecystic fluid or gallbladder wall thickening.  Common bile duct measures 6.8 mm  in greatest transverse dimension. The right kidney is small in size with echogenic renal parenchyma without evidence for perinephric fluid or hydronephrosis. The right kidney measures 8.8 x 5.6 x 4.7 cm.  There are small right-sided renal cysts within the mid lower pole which measure 1.9 x 1.8 x 2.3 cm. The smaller cyst measures up to 10 mm in size. The left kidney was also imaged. Ultrasound reveals only what appears to be end-stage renal insufficiency and multiple cysts. The pancreas is obscured by  bowel gas.. Abdominal aorta has a mildly irregular contour probably related to atherosclerotic disease.. Inferior vena cava is not visualized.. No ascites is visualized. Large right-sided pleural effusion is visualized.     1.  Echogenic right-sided renal parenchyma suggests sequela of chronic renal sufficiency. 2.  Cholelithiasis. 3.  Nonvisualization of pancreas. Electronically signed by:  Tori Arteaga MD  6/3/2017 2:42 PM CDT Workstation: [email or web address]      HISTORY OF PRESENT ILLNESS:   Roni Middleton is a 83 y.o. male who presents with increased generalized weakness. Family was trying to get him to go doctor today and could not raise arms or hold his own body weight up. This has been getting progressively worse for the past 4 days. Last week he was able to walk. Today they were [facility] to get to the [facility]. Patient went to the coumadin clinic 5/24/17. Recently he has had to go weekly as he has been having problems controlling it recently.     DIAGNOSTIC DATA:   PT/INR 6/2/17: >120/>18.0--> 6/3/17 16/1.28  CKMB 6/2/17: 3.34  Troponin 6/2/17: 0.404->0.291->0.333->0.382->0.411->0.334->0.328->0.297->0.326     HOSPITAL COURSE:  Patient came in with weakness and was found to have coumadin toxicity with an INR greater than 18 and was given vitamin K which resolved this, coumadin was held.  He was also found to have transaminitis and so Dr. Hoang was consulted, RUQ U/S was done which showed cholelithiasis.  MRCP was done which showed no choledocholithiasis.  Patient then proceeded to develop pneumonia and was given azithromycin.  Patient continued to decline and then decided to go home on hospice care.  Patient was stable and ready for discharge to hospice on day of admission.  On day of discharge patient declined dialysis and all medications except for tylenol #3.            DISCHARGE CONDITION:   Stable.    DISPOSITION:  Hospice/Home    DISCHARGE MEDICATIONS   Roni Middleton   Home Medication  Instructions [facility]:[id number]    Printed on:06/06/17 0245   Medication Information                      acetaminophen (TYLENOL) 650 MG suppository  Insert 1 suppository into the rectum Every 4 (Four) Hours As Needed for Mild Pain (1-3) or Fever.             LORazepam (ATIVAN) 1 MG tablet  Take 1 tablet by mouth Every 6 (Six) Hours As Needed for Anxiety.             Morphine 20 MG/ML concentrated solution  Take 0.25 mL by mouth Every 4 (Four) Hours As Needed for Severe Pain (7-10) (shortness of breath).             prochlorperazine (COMPAZINE) 25 MG suppository  Insert 1 suppository into the rectum Every 12 (Twelve) Hours As Needed for Nausea or Vomiting.             sucralfate (CARAFATE) 1 G tablet  Take 1 g by mouth 3 (Three) Times a Day. For 14 days.                 INSTRUCTIONS:  Activity:   Activity Instructions     Activity as Tolerated                   Diet:   Diet Instructions     Diet: Regular; Thin Liquids, No Restrictions       Discharge Diet:  Regular   Fluid Consistency:  Thin Liquids, No Restrictions               Special instructions: Patient instructed to call MD or return to ED with worsening shortness of breath, chest pain, fever greater than 100.4 degrees F or any other medical concerns..    FOLLOW UP:   Follow-up Information     Follow up with [facility] .    Specialty:  Hospice    Contact information:    [facility] [facility] [address] [phone]        Follow up with Dago Lopez MD .    Specialty:  Family Medicine    Contact information:    [address] [facility] [address] [phone]            PENDING TEST RESULTS AT DISCHARGE   Order Current Status    CEA In process    Blood Culture Preliminary result    Blood Culture Preliminary result          Time: 30 minutes    Dr. Hernandez is the attending at time of discharge, he is aware of the patient's status and agrees with the above discharge summary.            This document has been electronically signed  by Paulina Samuel MD on June 6, 2017 1:18 PM        I have examined the patient and reviewed the pertinent diagnostic data. I have discussed the case with the Family Medicine Resident and agree with the assessment and plan of care.    Home Hernandez DO           This document has been electronically signed by Home Hernandez DO on June 6, 2017 4:15 PM       Electronically signed by Hoem Hernandez DO at 6/6/2017  4:15 PM

## 2017-06-08 LAB
BACTERIA SPEC AEROBE CULT: NORMAL
BACTERIA SPEC AEROBE CULT: NORMAL

## 2021-12-13 NOTE — NURSING NOTE
Pt went to dialysis at 1300, back 1630.  Discharged in NAD, with wife and daughter per wc to home at 1745.   
Resident

## (undated) DEVICE — GOWN,PREVENTION PLUS,XLARGE,STERILE: Brand: MEDLINE

## (undated) DEVICE — INTENDED FOR TISSUE SEPARATION, AND OTHER PROCEDURES THAT REQUIRE A SHARP SURGICAL BLADE TO PUNCTURE OR CUT.: Brand: BARD-PARKER ® DISPOSABLE SCALPELS

## (undated) DEVICE — SUT VICRYL 4/0 SUTUPAK 18 J109T

## (undated) DEVICE — SUT PROLENE 7-0 BV175-7 24IN DB ETH8766H

## (undated) DEVICE — DRSNG SURESITE WNDW 4X4.5

## (undated) DEVICE — MAD PERIPHERAL VASCULAR-LF: Brand: MEDLINE INDUSTRIES, INC.

## (undated) DEVICE — SKIN AFFIX SURG ADHESIVE 72/CS 0.55ML: Brand: MEDLINE

## (undated) DEVICE — SUT MONOCRYL 4/0 PS2 27IN Y426H ETY426H

## (undated) DEVICE — KT INTRO MINISTICK MAX W/GW PALLADIUM ECHO 4F 21G 7CM

## (undated) DEVICE — ELECTRD BLD EZ CLN MOD 2.5IN

## (undated) DEVICE — GLV SURG TRIUMPH LT PF LTX 6.5 STRL

## (undated) DEVICE — SUT PROLN CARDIO BV1 6/0 24IN 8805H

## (undated) DEVICE — NDL HYPO SFTY GLD 22G 1 1/2IN

## (undated) DEVICE — HEMOSPLIT XK HEMODIALYSIS CATH, ST, 16 FR. 23CM: Brand: HEMOSPLIT XK LONG-TERM HEMODIALYSIS CATHETER

## (undated) DEVICE — GLV SURG SENSICARE GREEN W/ALOE PF LF 6.5 STRL

## (undated) DEVICE — SUT ETHLN 2/0 FS 18IN 664H

## (undated) DEVICE — NDL HYPO PRECISIONGLIDE/REG 30G 1/2 BRN

## (undated) DEVICE — SUT PROLENE CARDIO C1D 6/0 24IN 8726H

## (undated) DEVICE — DRSNG TELFA PAD NONADH STR 1S 3X8IN

## (undated) DEVICE — BIOPATCH™ ANTIMICROBIAL DRESSING WITH CHLORHEXIDINE GLUCONATE IS A HYDROPHILLIC POLYURETHANE ABSORPTIVE FOAM WITH CHLORHEXIDINE GLUCONATE (CHG) WHICH INHIBITS BACTERIAL GROWTH UNDER THE DRESSING. THE DRESSING IS INTENDED TO BE USED TO ABSORB EXUDATE, COVER A WOUND CAUSED BY VASCULAR AND NONVASCULAR PERCUTANEOUS MEDICAL DEVICES DURING SURGERY, AS WELL AS REDUCE LOCAL INFECTION AND COLONIZATION OF MICROORGANISMS.: Brand: BIOPATCH

## (undated) DEVICE — SHEET, T, LAPAROTOMY, STERILE: Brand: MEDLINE

## (undated) DEVICE — COVER,MAYO STAND,STERILE: Brand: MEDLINE

## (undated) DEVICE — SUT VIC 3/0 SH 27IN J416H

## (undated) DEVICE — SUT VIC 3/0 TIES 18IN J110T

## (undated) DEVICE — SYS SKIN CLS DERMABOND PRINEO W/60CM MESH TP

## (undated) DEVICE — DECANTER: Brand: UNBRANDED

## (undated) DEVICE — TOWEL,OR,DSP,ST,NATURAL,DLX,4/PK,20PK/CS: Brand: MEDLINE

## (undated) DEVICE — ADHESIVE ISLAND DRESSING: Brand: TELFA

## (undated) DEVICE — SUT PDS 3/0 SH 27IN DYED Z316H

## (undated) DEVICE — PART NUMBER 108260, VTI 8 MHZ DISPOSABLE DOPPLER PROBE, STRAIGHT, BOX: Brand: VTI 8 MHZ DISPOSABLE DOPPLER PROBE, STRAIGHT, BOX

## (undated) DEVICE — SOL NACL 0.9PCT 1000ML

## (undated) DEVICE — SOL IRR NACL 0.9PCT BT 1000ML

## (undated) DEVICE — SUT ETHLN 3-0 FS118IN 663H

## (undated) DEVICE — I-KNIFE CUTTING INSTRUMENT 15 DEGREE: Brand: I-KNIFE